# Patient Record
Sex: MALE | Race: ASIAN
[De-identification: names, ages, dates, MRNs, and addresses within clinical notes are randomized per-mention and may not be internally consistent; named-entity substitution may affect disease eponyms.]

---

## 2017-03-23 ENCOUNTER — APPOINTMENT (OUTPATIENT)
Dept: UROLOGY | Facility: CLINIC | Age: 62
End: 2017-03-23

## 2017-05-03 ENCOUNTER — EMERGENCY (EMERGENCY)
Facility: HOSPITAL | Age: 62
LOS: 0 days | Discharge: HOME | End: 2017-05-03

## 2017-06-28 DIAGNOSIS — M71.22 SYNOVIAL CYST OF POPLITEAL SPACE [BAKER], LEFT KNEE: ICD-10-CM

## 2017-06-28 DIAGNOSIS — Z79.4 LONG TERM (CURRENT) USE OF INSULIN: ICD-10-CM

## 2017-06-28 DIAGNOSIS — M25.562 PAIN IN LEFT KNEE: ICD-10-CM

## 2017-06-28 DIAGNOSIS — I10 ESSENTIAL (PRIMARY) HYPERTENSION: ICD-10-CM

## 2017-06-28 DIAGNOSIS — Z79.899 OTHER LONG TERM (CURRENT) DRUG THERAPY: ICD-10-CM

## 2017-06-28 DIAGNOSIS — Z79.84 LONG TERM (CURRENT) USE OF ORAL HYPOGLYCEMIC DRUGS: ICD-10-CM

## 2017-06-28 DIAGNOSIS — I25.10 ATHEROSCLEROTIC HEART DISEASE OF NATIVE CORONARY ARTERY WITHOUT ANGINA PECTORIS: ICD-10-CM

## 2017-06-28 DIAGNOSIS — E11.9 TYPE 2 DIABETES MELLITUS WITHOUT COMPLICATIONS: ICD-10-CM

## 2017-06-28 DIAGNOSIS — Z79.82 LONG TERM (CURRENT) USE OF ASPIRIN: ICD-10-CM

## 2017-06-28 DIAGNOSIS — K52.9 NONINFECTIVE GASTROENTERITIS AND COLITIS, UNSPECIFIED: ICD-10-CM

## 2017-06-28 DIAGNOSIS — Z95.1 PRESENCE OF AORTOCORONARY BYPASS GRAFT: ICD-10-CM

## 2017-06-28 DIAGNOSIS — E78.00 PURE HYPERCHOLESTEROLEMIA, UNSPECIFIED: ICD-10-CM

## 2017-07-24 ENCOUNTER — EMERGENCY (EMERGENCY)
Facility: HOSPITAL | Age: 62
LOS: 0 days | Discharge: HOME | End: 2017-07-24

## 2017-07-24 DIAGNOSIS — K21.9 GASTRO-ESOPHAGEAL REFLUX DISEASE WITHOUT ESOPHAGITIS: ICD-10-CM

## 2017-07-24 DIAGNOSIS — E11.9 TYPE 2 DIABETES MELLITUS WITHOUT COMPLICATIONS: ICD-10-CM

## 2017-07-24 DIAGNOSIS — R34 ANURIA AND OLIGURIA: ICD-10-CM

## 2017-07-24 DIAGNOSIS — Z95.1 PRESENCE OF AORTOCORONARY BYPASS GRAFT: ICD-10-CM

## 2017-07-24 DIAGNOSIS — Z79.82 LONG TERM (CURRENT) USE OF ASPIRIN: ICD-10-CM

## 2017-07-24 DIAGNOSIS — B34.9 VIRAL INFECTION, UNSPECIFIED: ICD-10-CM

## 2017-07-24 DIAGNOSIS — I25.10 ATHEROSCLEROTIC HEART DISEASE OF NATIVE CORONARY ARTERY WITHOUT ANGINA PECTORIS: ICD-10-CM

## 2017-07-24 DIAGNOSIS — I10 ESSENTIAL (PRIMARY) HYPERTENSION: ICD-10-CM

## 2017-07-24 DIAGNOSIS — E78.00 PURE HYPERCHOLESTEROLEMIA, UNSPECIFIED: ICD-10-CM

## 2017-07-24 DIAGNOSIS — R20.2 PARESTHESIA OF SKIN: ICD-10-CM

## 2017-07-24 DIAGNOSIS — E78.5 HYPERLIPIDEMIA, UNSPECIFIED: ICD-10-CM

## 2017-07-24 DIAGNOSIS — Z88.0 ALLERGY STATUS TO PENICILLIN: ICD-10-CM

## 2017-07-24 DIAGNOSIS — M79.1 MYALGIA: ICD-10-CM

## 2017-09-14 ENCOUNTER — APPOINTMENT (OUTPATIENT)
Dept: UROLOGY | Facility: CLINIC | Age: 62
End: 2017-09-14
Payer: MEDICAID

## 2017-09-14 VITALS
SYSTOLIC BLOOD PRESSURE: 114 MMHG | BODY MASS INDEX: 29.82 KG/M2 | HEART RATE: 89 BPM | HEIGHT: 67 IN | WEIGHT: 190 LBS | DIASTOLIC BLOOD PRESSURE: 62 MMHG

## 2017-09-14 DIAGNOSIS — R39.13 SPLITTING OF URINARY STREAM: ICD-10-CM

## 2017-09-14 DIAGNOSIS — K21.9 GASTRO-ESOPHAGEAL REFLUX DISEASE W/OUT ESOPHAGITIS: ICD-10-CM

## 2017-09-14 DIAGNOSIS — Z82.49 FAMILY HISTORY OF ISCHEMIC HEART DISEASE AND OTHER DISEASES OF THE CIRCULATORY SYSTEM: ICD-10-CM

## 2017-09-14 DIAGNOSIS — Z87.891 PERSONAL HISTORY OF NICOTINE DEPENDENCE: ICD-10-CM

## 2017-09-14 DIAGNOSIS — R35.1 NOCTURIA: ICD-10-CM

## 2017-09-14 DIAGNOSIS — R33.9 RETENTION OF URINE, UNSPECIFIED: ICD-10-CM

## 2017-09-14 DIAGNOSIS — Z83.3 FAMILY HISTORY OF DIABETES MELLITUS: ICD-10-CM

## 2017-09-14 DIAGNOSIS — N28.1 CYST OF KIDNEY, ACQUIRED: ICD-10-CM

## 2017-09-14 PROCEDURE — 99214 OFFICE O/P EST MOD 30 MIN: CPT

## 2017-09-14 NOTE — HISTORY OF PRESENT ILLNESS
[FreeTextEntry1] : Donavon Is a pleasant 62-year-old male initially from [place] who had been seen For several years as far back as 2012 2013. He has voiding dysfunction which had been controlled with alpha blockade and though he would void by day with small amounts, his uroflow the last one of which was done in October 2016 had a peak of 21 average of 11 with a volume of 538 and a post would residual of a tablespoon. He went back to [place] recently came back and tells me that since last seen if anything his voiding is 20% better. Yet he still says he is voiding slowly with small amounts with burning. If he's better than he was in October and October was within normal limits I'm not sure what the issue is here.\par \par \par \par Currently he says he has the following AUA symptom score.\par \par Incomplete emptying – five\par frequency – four\par intermittency – three\par urgency – three\par slow stream – four\par straining – 3/4\par nocturia times four \par and as a whole on a scale of 0-6 this is making him rather unhappy – five\par \par This gives him a total score of 35 which is very high.\par \par \par \par He had laboratory studies done on July 24  because of right-sided flank pain which was nondiagnostic for intra-abdominal pathology  and laboratory values at that time  showed a high glucose at 255 a very concentrated urine at greater than 1.0304+ sugar and a culture that was less than 10,000.\par \par \par \par The here today as despite Dr. Matamoros doubling up the Flomax to twice per day he still unhappy.

## 2017-09-14 NOTE — PHYSICAL EXAM
[General Appearance - Well Developed] : well developed [General Appearance - Well Nourished] : well nourished [Normal Appearance] : normal appearance [Well Groomed] : well groomed [General Appearance - In No Acute Distress] : no acute distress [Abdomen Soft] : soft [Abdomen Tenderness] : non-tender [Costovertebral Angle Tenderness] : no ~M costovertebral angle tenderness [Penis Abnormality] : normal circumcised penis [Scrotum] : the scrotum was normal [Epididymis] : the epididymides were normal [Testes Tenderness] : no tenderness of the testes [Testes Mass (___cm)] : there were no testicular masses [Anus Abnormality] : the anus and perineum were normal [Prostate Tenderness] : the prostate was not tender [No Prostate Nodules] : no prostate nodules [Prostate Size ___ gm] : prostate size [unfilled] gm [] : no respiratory distress [Respiration, Rhythm And Depth] : normal respiratory rhythm and effort [Exaggerated Use Of Accessory Muscles For Inspiration] : no accessory muscle use [Auscultation Breath Sounds / Voice Sounds] : lungs were clear to auscultation bilaterally [Oriented To Time, Place, And Person] : oriented to person, place, and time [Affect] : the affect was normal [Mood] : the mood was normal [Not Anxious] : not anxious [Normal Station and Gait] : the gait and station were normal for the patient's age [FreeTextEntry1] : BC reflex and deep tendon reflexes were normal

## 2017-09-14 NOTE — LETTER HEADER
[Matias Marte MD] : Matias Marte MD [Director of Urology] : Director of Urology [[address]] : [address] [[address]] : [address] [[place] [address]] : [place] [address] [Tel [phone]] : Tel [phone] [Fax [phone]] : Fax [phone]

## 2017-09-14 NOTE — ASSESSMENT
[FreeTextEntry1] : He is not been seen for close to a year. He is telling me that his symptoms are better but terrible. However the objective testing done in October was actually quite good. It may be that during the time in [place] it slowly got worse and now it's better than it was there but not as good as it was when we saw him in the past. Were going to need start from scratch getting at least semi-objective testing and deciding whether or not to go to invasive or noninvasive urodynamics. Please note he has been on the Proscar for quite some time  and his prostate is still quite large.

## 2017-10-12 ENCOUNTER — OUTPATIENT (OUTPATIENT)
Dept: OUTPATIENT SERVICES | Facility: HOSPITAL | Age: 62
LOS: 1 days | Discharge: HOME | End: 2017-10-12

## 2017-10-12 DIAGNOSIS — M10.062 IDIOPATHIC GOUT, LEFT KNEE: ICD-10-CM

## 2017-10-12 DIAGNOSIS — R20.2 PARESTHESIA OF SKIN: ICD-10-CM

## 2017-10-12 DIAGNOSIS — M79.1 MYALGIA: ICD-10-CM

## 2017-10-12 DIAGNOSIS — E11.9 TYPE 2 DIABETES MELLITUS WITHOUT COMPLICATIONS: ICD-10-CM

## 2017-10-20 ENCOUNTER — EMERGENCY (EMERGENCY)
Facility: HOSPITAL | Age: 62
LOS: 0 days | Discharge: HOME | End: 2017-10-21

## 2017-10-20 DIAGNOSIS — Z95.1 PRESENCE OF AORTOCORONARY BYPASS GRAFT: ICD-10-CM

## 2017-10-20 DIAGNOSIS — M25.511 PAIN IN RIGHT SHOULDER: ICD-10-CM

## 2017-10-20 DIAGNOSIS — M25.562 PAIN IN LEFT KNEE: ICD-10-CM

## 2017-10-20 DIAGNOSIS — G89.29 OTHER CHRONIC PAIN: ICD-10-CM

## 2017-10-20 DIAGNOSIS — Z88.0 ALLERGY STATUS TO PENICILLIN: ICD-10-CM

## 2017-10-20 DIAGNOSIS — M25.462 EFFUSION, LEFT KNEE: ICD-10-CM

## 2017-10-20 DIAGNOSIS — Z79.82 LONG TERM (CURRENT) USE OF ASPIRIN: ICD-10-CM

## 2017-10-20 DIAGNOSIS — E11.9 TYPE 2 DIABETES MELLITUS WITHOUT COMPLICATIONS: ICD-10-CM

## 2017-10-20 DIAGNOSIS — E78.00 PURE HYPERCHOLESTEROLEMIA, UNSPECIFIED: ICD-10-CM

## 2017-10-20 DIAGNOSIS — K21.9 GASTRO-ESOPHAGEAL REFLUX DISEASE WITHOUT ESOPHAGITIS: ICD-10-CM

## 2017-10-20 DIAGNOSIS — I10 ESSENTIAL (PRIMARY) HYPERTENSION: ICD-10-CM

## 2017-10-20 DIAGNOSIS — M79.1 MYALGIA: ICD-10-CM

## 2017-10-20 DIAGNOSIS — R20.2 PARESTHESIA OF SKIN: ICD-10-CM

## 2017-10-20 DIAGNOSIS — M54.2 CERVICALGIA: ICD-10-CM

## 2017-10-24 ENCOUNTER — INPATIENT (INPATIENT)
Facility: HOSPITAL | Age: 62
LOS: 0 days | Discharge: HOME | End: 2017-10-25
Attending: INTERNAL MEDICINE

## 2017-10-24 DIAGNOSIS — M79.1 MYALGIA: ICD-10-CM

## 2017-10-24 DIAGNOSIS — R20.2 PARESTHESIA OF SKIN: ICD-10-CM

## 2017-10-24 DIAGNOSIS — E11.9 TYPE 2 DIABETES MELLITUS WITHOUT COMPLICATIONS: ICD-10-CM

## 2017-10-30 DIAGNOSIS — R51 HEADACHE: ICD-10-CM

## 2017-10-30 DIAGNOSIS — M17.10 UNILATERAL PRIMARY OSTEOARTHRITIS, UNSPECIFIED KNEE: ICD-10-CM

## 2017-10-30 DIAGNOSIS — R53.1 WEAKNESS: ICD-10-CM

## 2017-10-30 DIAGNOSIS — I10 ESSENTIAL (PRIMARY) HYPERTENSION: ICD-10-CM

## 2017-10-30 DIAGNOSIS — Z79.4 LONG TERM (CURRENT) USE OF INSULIN: ICD-10-CM

## 2017-10-30 DIAGNOSIS — E78.5 HYPERLIPIDEMIA, UNSPECIFIED: ICD-10-CM

## 2017-10-30 DIAGNOSIS — Z88.0 ALLERGY STATUS TO PENICILLIN: ICD-10-CM

## 2017-10-30 DIAGNOSIS — N40.0 BENIGN PROSTATIC HYPERPLASIA WITHOUT LOWER URINARY TRACT SYMPTOMS: ICD-10-CM

## 2017-10-30 DIAGNOSIS — K21.9 GASTRO-ESOPHAGEAL REFLUX DISEASE WITHOUT ESOPHAGITIS: ICD-10-CM

## 2017-10-30 DIAGNOSIS — I25.10 ATHEROSCLEROTIC HEART DISEASE OF NATIVE CORONARY ARTERY WITHOUT ANGINA PECTORIS: ICD-10-CM

## 2017-10-30 DIAGNOSIS — E11.40 TYPE 2 DIABETES MELLITUS WITH DIABETIC NEUROPATHY, UNSPECIFIED: ICD-10-CM

## 2017-10-30 DIAGNOSIS — Z95.1 PRESENCE OF AORTOCORONARY BYPASS GRAFT: ICD-10-CM

## 2017-10-30 DIAGNOSIS — R20.0 ANESTHESIA OF SKIN: ICD-10-CM

## 2017-11-01 ENCOUNTER — OTHER (OUTPATIENT)
Age: 62
End: 2017-11-01

## 2017-12-01 ENCOUNTER — APPOINTMENT (OUTPATIENT)
Dept: UROLOGY | Facility: CLINIC | Age: 62
End: 2017-12-01

## 2018-03-29 ENCOUNTER — EMERGENCY (EMERGENCY)
Facility: HOSPITAL | Age: 63
LOS: 0 days | Discharge: HOME | End: 2018-03-29

## 2018-03-29 VITALS
DIASTOLIC BLOOD PRESSURE: 88 MMHG | TEMPERATURE: 97 F | HEART RATE: 85 BPM | RESPIRATION RATE: 20 BRPM | OXYGEN SATURATION: 98 % | SYSTOLIC BLOOD PRESSURE: 133 MMHG

## 2018-03-29 DIAGNOSIS — Z87.891 PERSONAL HISTORY OF NICOTINE DEPENDENCE: ICD-10-CM

## 2018-03-29 DIAGNOSIS — Z88.0 ALLERGY STATUS TO PENICILLIN: ICD-10-CM

## 2018-03-29 DIAGNOSIS — S49.91XA UNSPECIFIED INJURY OF RIGHT SHOULDER AND UPPER ARM, INITIAL ENCOUNTER: ICD-10-CM
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
## 2018-03-29 DIAGNOSIS — Z98.890 OTHER SPECIFIED POSTPROCEDURAL STATES: Chronic | ICD-10-CM
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
## 2018-03-29 DIAGNOSIS — S46.911A STRAIN OF UNSPECIFIED MUSCLE, FASCIA AND TENDON AT SHOULDER AND UPPER ARM LEVEL, RIGHT ARM, INITIAL ENCOUNTER: ICD-10-CM

## 2018-03-29 DIAGNOSIS — Z79.84 LONG TERM (CURRENT) USE OF ORAL HYPOGLYCEMIC DRUGS: ICD-10-CM

## 2018-03-29 DIAGNOSIS — Z98.890 OTHER SPECIFIED POSTPROCEDURAL STATES: ICD-10-CM

## 2018-03-29 DIAGNOSIS — I10 ESSENTIAL (PRIMARY) HYPERTENSION: ICD-10-CM
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
## 2018-03-29 DIAGNOSIS — E11.9 TYPE 2 DIABETES MELLITUS WITHOUT COMPLICATIONS: ICD-10-CM
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
## 2018-03-29 DIAGNOSIS — E78.5 HYPERLIPIDEMIA, UNSPECIFIED: ICD-10-CM

## 2018-03-29 RX ORDER — IBUPROFEN 200 MG
600 TABLET ORAL ONCE
Qty: 0 | Refills: 0 | Status: COMPLETED | OUTPATIENT
Start: 2018-03-29 | End: 2018-03-29

## 2018-03-29 RX ADMIN — Medication 600 MILLIGRAM(S): at 22:00

## 2018-03-29 RX ADMIN — Medication 600 MILLIGRAM(S): at 21:30

## 2018-03-29 NOTE — ED PROVIDER NOTE - MUSCULOSKELETAL, MLM
Right shoulder: no deformity, (+) tenderness posteriorly, limited ROM due to pain, brachial and radial pulses 2+, no motor or sensory deficit

## 2018-03-29 NOTE — ED PROVIDER NOTE - PMH
ASHD (arteriosclerotic heart disease)    Hyperlipidemia, unspecified hyperlipidemia type    Hypertension, unspecified type    Type 2 diabetes mellitus with complication, unspecified long term insulin use status

## 2018-03-29 NOTE — ED PROVIDER NOTE - MEDICAL DECISION MAKING DETAILS
RICE; Tylenol prn; orthopedic follow up Dr. José; any new or worsening symptoms, will return to ER  Note complete

## 2018-03-29 NOTE — ED PROVIDER NOTE - OBJECTIVE STATEMENT
62 y.o. male with a PMHx of DM and ASHD presented to the ER c/o Right shoulder injury today.  Pt states he reached overhead for a suitcase.  The suitcase was heavy and his arm dropped down straining his shoulder. (+) h/o Right shoulder "problem." Pt never had recommended sx by ortho. Limited ROM.  Denies extremity weakness/paresthesias.  No other complaints.

## 2018-04-13 NOTE — LETTER BODY
30 day ILR carelink remote transmission. See device report for details. [FreeTextEntry2] : Yady Juárez MD\par [address] [facility]\[place] [address] [Attached please find my note.] : Attached please find my note. [Thank you very much for allowing me to participate in the care of this patient. If you have any questions, please do not hesitate to contact me] : Thank you very much for allowing me to participate in the care of this patient. If you have any questions, please do not hesitate to contact me.

## 2019-01-10 ENCOUNTER — INPATIENT (INPATIENT)
Facility: HOSPITAL | Age: 64
LOS: 0 days | Discharge: LEFT BEFORE DISCHARGE | End: 2019-01-11
Attending: HOSPITALIST | Admitting: HOSPITALIST

## 2019-01-10 VITALS
OXYGEN SATURATION: 99 % | HEART RATE: 79 BPM | SYSTOLIC BLOOD PRESSURE: 103 MMHG | RESPIRATION RATE: 20 BRPM | TEMPERATURE: 97 F | DIASTOLIC BLOOD PRESSURE: 59 MMHG

## 2019-01-10 DIAGNOSIS — Z98.890 OTHER SPECIFIED POSTPROCEDURAL STATES: Chronic | ICD-10-CM

## 2019-01-10 LAB
ALBUMIN SERPL ELPH-MCNC: 4.4 G/DL — SIGNIFICANT CHANGE UP (ref 3.5–5.2)
ALP SERPL-CCNC: 87 U/L — SIGNIFICANT CHANGE UP (ref 30–115)
ALT FLD-CCNC: 27 U/L — SIGNIFICANT CHANGE UP (ref 0–41)
ANION GAP SERPL CALC-SCNC: 19 MMOL/L — HIGH (ref 7–14)
AST SERPL-CCNC: 20 U/L — SIGNIFICANT CHANGE UP (ref 0–41)
BASOPHILS # BLD AUTO: 0.01 K/UL — SIGNIFICANT CHANGE UP (ref 0–0.2)
BASOPHILS NFR BLD AUTO: 0.1 % — SIGNIFICANT CHANGE UP (ref 0–1)
BILIRUB SERPL-MCNC: 0.4 MG/DL — SIGNIFICANT CHANGE UP (ref 0.2–1.2)
BUN SERPL-MCNC: 23 MG/DL — HIGH (ref 10–20)
CALCIUM SERPL-MCNC: 9.8 MG/DL — SIGNIFICANT CHANGE UP (ref 8.5–10.1)
CHLORIDE SERPL-SCNC: 97 MMOL/L — LOW (ref 98–110)
CO2 SERPL-SCNC: 22 MMOL/L — SIGNIFICANT CHANGE UP (ref 17–32)
CREAT SERPL-MCNC: 1.5 MG/DL — SIGNIFICANT CHANGE UP (ref 0.7–1.5)
EOSINOPHIL # BLD AUTO: 0.05 K/UL — SIGNIFICANT CHANGE UP (ref 0–0.7)
EOSINOPHIL NFR BLD AUTO: 0.6 % — SIGNIFICANT CHANGE UP (ref 0–8)
GLUCOSE SERPL-MCNC: 162 MG/DL — HIGH (ref 70–99)
HCT VFR BLD CALC: 43.6 % — SIGNIFICANT CHANGE UP (ref 42–52)
HGB BLD-MCNC: 15 G/DL — SIGNIFICANT CHANGE UP (ref 14–18)
IMM GRANULOCYTES NFR BLD AUTO: 0.2 % — SIGNIFICANT CHANGE UP (ref 0.1–0.3)
LACTATE SERPL-SCNC: 1.4 MMOL/L — SIGNIFICANT CHANGE UP (ref 0.5–2.2)
LIDOCAIN IGE QN: 107 U/L — HIGH (ref 7–60)
LYMPHOCYTES # BLD AUTO: 1.16 K/UL — LOW (ref 1.2–3.4)
LYMPHOCYTES # BLD AUTO: 13.6 % — LOW (ref 20.5–51.1)
MCHC RBC-ENTMCNC: 29.8 PG — SIGNIFICANT CHANGE UP (ref 27–31)
MCHC RBC-ENTMCNC: 34.4 G/DL — SIGNIFICANT CHANGE UP (ref 32–37)
MCV RBC AUTO: 86.5 FL — SIGNIFICANT CHANGE UP (ref 80–94)
MONOCYTES # BLD AUTO: 0.54 K/UL — SIGNIFICANT CHANGE UP (ref 0.1–0.6)
MONOCYTES NFR BLD AUTO: 6.4 % — SIGNIFICANT CHANGE UP (ref 1.7–9.3)
NEUTROPHILS # BLD AUTO: 6.72 K/UL — HIGH (ref 1.4–6.5)
NEUTROPHILS NFR BLD AUTO: 79.1 % — HIGH (ref 42.2–75.2)
NRBC # BLD: 0 /100 WBCS — SIGNIFICANT CHANGE UP (ref 0–0)
PLATELET # BLD AUTO: 184 K/UL — SIGNIFICANT CHANGE UP (ref 130–400)
POTASSIUM SERPL-MCNC: 5 MMOL/L — SIGNIFICANT CHANGE UP (ref 3.5–5)
POTASSIUM SERPL-SCNC: 5 MMOL/L — SIGNIFICANT CHANGE UP (ref 3.5–5)
PROT SERPL-MCNC: 7.3 G/DL — SIGNIFICANT CHANGE UP (ref 6–8)
RBC # BLD: 5.04 M/UL — SIGNIFICANT CHANGE UP (ref 4.7–6.1)
RBC # FLD: 13.2 % — SIGNIFICANT CHANGE UP (ref 11.5–14.5)
SODIUM SERPL-SCNC: 138 MMOL/L — SIGNIFICANT CHANGE UP (ref 135–146)
TROPONIN T SERPL-MCNC: <0.01 NG/ML — SIGNIFICANT CHANGE UP
WBC # BLD: 8.5 K/UL — SIGNIFICANT CHANGE UP (ref 4.8–10.8)
WBC # FLD AUTO: 8.5 K/UL — SIGNIFICANT CHANGE UP (ref 4.8–10.8)

## 2019-01-10 RX ORDER — SODIUM CHLORIDE 9 MG/ML
1000 INJECTION, SOLUTION INTRAVENOUS ONCE
Qty: 0 | Refills: 0 | Status: COMPLETED | OUTPATIENT
Start: 2019-01-10 | End: 2019-01-10

## 2019-01-10 RX ADMIN — SODIUM CHLORIDE 1000 MILLILITER(S): 9 INJECTION, SOLUTION INTRAVENOUS at 22:57

## 2019-01-10 NOTE — ED ADULT NURSE NOTE - NSIMPLEMENTINTERV_GEN_ALL_ED
Implemented All Universal Safety Interventions:  [place] to call system. Call bell, personal items and telephone within reach. Instruct patient to call for assistance. Room bathroom lighting operational. Non-slip footwear when patient is off stretcher. Physically safe environment: no spills, clutter or unnecessary equipment. Stretcher in lowest position, wheels locked, appropriate side rails in place.

## 2019-01-11 ENCOUNTER — TRANSCRIPTION ENCOUNTER (OUTPATIENT)
Age: 64
End: 2019-01-11

## 2019-01-11 VITALS
TEMPERATURE: 96 F | RESPIRATION RATE: 18 BRPM | DIASTOLIC BLOOD PRESSURE: 67 MMHG | SYSTOLIC BLOOD PRESSURE: 116 MMHG | HEART RATE: 95 BPM

## 2019-01-11 PROBLEM — E11.8 TYPE 2 DIABETES MELLITUS WITH UNSPECIFIED COMPLICATIONS: Chronic | Status: ACTIVE | Noted: 2018-03-29

## 2019-01-11 PROBLEM — E78.5 HYPERLIPIDEMIA, UNSPECIFIED: Chronic | Status: ACTIVE | Noted: 2018-03-29

## 2019-01-11 PROBLEM — I25.10 ATHEROSCLEROTIC HEART DISEASE OF NATIVE CORONARY ARTERY WITHOUT ANGINA PECTORIS: Chronic | Status: ACTIVE | Noted: 2018-03-29

## 2019-01-11 PROBLEM — I10 ESSENTIAL (PRIMARY) HYPERTENSION: Chronic | Status: ACTIVE | Noted: 2018-03-29

## 2019-01-11 LAB
ANION GAP SERPL CALC-SCNC: 21 MMOL/L — HIGH (ref 7–14)
APTT BLD: 32.1 SEC — SIGNIFICANT CHANGE UP (ref 27–39.2)
BASOPHILS # BLD AUTO: 0.01 K/UL — SIGNIFICANT CHANGE UP (ref 0–0.2)
BASOPHILS NFR BLD AUTO: 0.2 % — SIGNIFICANT CHANGE UP (ref 0–1)
BLD GP AB SCN SERPL QL: SIGNIFICANT CHANGE UP
BUN SERPL-MCNC: 15 MG/DL — SIGNIFICANT CHANGE UP (ref 10–20)
CALCIUM SERPL-MCNC: 9.7 MG/DL — SIGNIFICANT CHANGE UP (ref 8.5–10.1)
CHLORIDE SERPL-SCNC: 94 MMOL/L — LOW (ref 98–110)
CHOLEST SERPL-MCNC: 168 MG/DL — SIGNIFICANT CHANGE UP (ref 100–200)
CO2 SERPL-SCNC: 25 MMOL/L — SIGNIFICANT CHANGE UP (ref 17–32)
CREAT SERPL-MCNC: 1.1 MG/DL — SIGNIFICANT CHANGE UP (ref 0.7–1.5)
EOSINOPHIL # BLD AUTO: 0.12 K/UL — SIGNIFICANT CHANGE UP (ref 0–0.7)
EOSINOPHIL NFR BLD AUTO: 2 % — SIGNIFICANT CHANGE UP (ref 0–8)
ESTIMATED AVERAGE GLUCOSE: 237 MG/DL — HIGH (ref 68–114)
GLUCOSE BLDC GLUCOMTR-MCNC: 139 MG/DL — HIGH (ref 70–99)
GLUCOSE BLDC GLUCOMTR-MCNC: 177 MG/DL — HIGH (ref 70–99)
GLUCOSE SERPL-MCNC: 158 MG/DL — HIGH (ref 70–99)
HBA1C BLD-MCNC: 9.9 % — HIGH (ref 4–5.6)
HCT VFR BLD CALC: 46.4 % — SIGNIFICANT CHANGE UP (ref 42–52)
HDLC SERPL-MCNC: 49 MG/DL — SIGNIFICANT CHANGE UP
HGB BLD-MCNC: 15.4 G/DL — SIGNIFICANT CHANGE UP (ref 14–18)
IMM GRANULOCYTES NFR BLD AUTO: 0.3 % — SIGNIFICANT CHANGE UP (ref 0.1–0.3)
INR BLD: 1.07 RATIO — SIGNIFICANT CHANGE UP (ref 0.65–1.3)
LIPID PNL WITH DIRECT LDL SERPL: 108 MG/DL — SIGNIFICANT CHANGE UP (ref 4–129)
LYMPHOCYTES # BLD AUTO: 1.13 K/UL — LOW (ref 1.2–3.4)
LYMPHOCYTES # BLD AUTO: 19 % — LOW (ref 20.5–51.1)
MAGNESIUM SERPL-MCNC: 2.1 MG/DL — SIGNIFICANT CHANGE UP (ref 1.8–2.4)
MCHC RBC-ENTMCNC: 28.6 PG — SIGNIFICANT CHANGE UP (ref 27–31)
MCHC RBC-ENTMCNC: 33.2 G/DL — SIGNIFICANT CHANGE UP (ref 32–37)
MCV RBC AUTO: 86.2 FL — SIGNIFICANT CHANGE UP (ref 80–94)
MONOCYTES # BLD AUTO: 0.61 K/UL — HIGH (ref 0.1–0.6)
MONOCYTES NFR BLD AUTO: 10.3 % — HIGH (ref 1.7–9.3)
NEUTROPHILS # BLD AUTO: 4.06 K/UL — SIGNIFICANT CHANGE UP (ref 1.4–6.5)
NEUTROPHILS NFR BLD AUTO: 68.2 % — SIGNIFICANT CHANGE UP (ref 42.2–75.2)
NRBC # BLD: 0 /100 WBCS — SIGNIFICANT CHANGE UP (ref 0–0)
PHOSPHATE SERPL-MCNC: 3.8 MG/DL — SIGNIFICANT CHANGE UP (ref 2.1–4.9)
PLATELET # BLD AUTO: 170 K/UL — SIGNIFICANT CHANGE UP (ref 130–400)
POTASSIUM SERPL-MCNC: 4.6 MMOL/L — SIGNIFICANT CHANGE UP (ref 3.5–5)
POTASSIUM SERPL-SCNC: 4.6 MMOL/L — SIGNIFICANT CHANGE UP (ref 3.5–5)
PROTHROM AB SERPL-ACNC: 12.3 SEC — SIGNIFICANT CHANGE UP (ref 9.95–12.87)
RBC # BLD: 5.38 M/UL — SIGNIFICANT CHANGE UP (ref 4.7–6.1)
RBC # FLD: 13.2 % — SIGNIFICANT CHANGE UP (ref 11.5–14.5)
SODIUM SERPL-SCNC: 140 MMOL/L — SIGNIFICANT CHANGE UP (ref 135–146)
TOTAL CHOLESTEROL/HDL RATIO MEASUREMENT: 3.4 RATIO — LOW (ref 4–5.5)
TRIGL SERPL-MCNC: 116 MG/DL — SIGNIFICANT CHANGE UP (ref 10–149)
TYPE + AB SCN PNL BLD: SIGNIFICANT CHANGE UP
WBC # BLD: 5.95 K/UL — SIGNIFICANT CHANGE UP (ref 4.8–10.8)
WBC # FLD AUTO: 5.95 K/UL — SIGNIFICANT CHANGE UP (ref 4.8–10.8)

## 2019-01-11 RX ORDER — PANTOPRAZOLE SODIUM 20 MG/1
1 TABLET, DELAYED RELEASE ORAL
Qty: 28 | Refills: 0 | OUTPATIENT
Start: 2019-01-11 | End: 2019-01-24

## 2019-01-11 RX ORDER — ACETAMINOPHEN 500 MG
650 TABLET ORAL ONCE
Qty: 0 | Refills: 0 | Status: COMPLETED | OUTPATIENT
Start: 2019-01-11 | End: 2019-01-11

## 2019-01-11 RX ORDER — CYCLOBENZAPRINE HYDROCHLORIDE 10 MG/1
10 TABLET, FILM COATED ORAL THREE TIMES A DAY
Qty: 0 | Refills: 0 | Status: DISCONTINUED | OUTPATIENT
Start: 2019-01-11 | End: 2019-01-11

## 2019-01-11 RX ORDER — ASPIRIN/CALCIUM CARB/MAGNESIUM 324 MG
1 TABLET ORAL
Qty: 0 | Refills: 0 | COMMUNITY

## 2019-01-11 RX ORDER — METFORMIN HYDROCHLORIDE 850 MG/1
0 TABLET ORAL
Qty: 0 | Refills: 0 | COMMUNITY

## 2019-01-11 RX ORDER — PANTOPRAZOLE SODIUM 20 MG/1
40 TABLET, DELAYED RELEASE ORAL
Qty: 0 | Refills: 0 | Status: DISCONTINUED | OUTPATIENT
Start: 2019-01-11 | End: 2019-01-11

## 2019-01-11 RX ORDER — PANTOPRAZOLE SODIUM 20 MG/1
1 TABLET, DELAYED RELEASE ORAL
Qty: 14 | Refills: 0
Start: 2019-01-11 | End: 2019-01-24

## 2019-01-11 RX ORDER — GEMFIBROZIL 600 MG
600 TABLET ORAL
Qty: 0 | Refills: 0 | Status: DISCONTINUED | OUTPATIENT
Start: 2019-01-11 | End: 2019-01-11

## 2019-01-11 RX ORDER — ZOLPIDEM TARTRATE 10 MG/1
5 TABLET ORAL AT BEDTIME
Qty: 0 | Refills: 0 | Status: DISCONTINUED | OUTPATIENT
Start: 2019-01-11 | End: 2019-01-11

## 2019-01-11 RX ORDER — SIMVASTATIN 20 MG/1
40 TABLET, FILM COATED ORAL AT BEDTIME
Qty: 0 | Refills: 0 | Status: DISCONTINUED | OUTPATIENT
Start: 2019-01-11 | End: 2019-01-11

## 2019-01-11 RX ORDER — LISINOPRIL 2.5 MG/1
2.5 TABLET ORAL DAILY
Qty: 0 | Refills: 0 | Status: DISCONTINUED | OUTPATIENT
Start: 2019-01-11 | End: 2019-01-11

## 2019-01-11 RX ORDER — PIOGLITAZONE HYDROCHLORIDE 15 MG/1
0 TABLET ORAL
Qty: 0 | Refills: 0 | COMMUNITY

## 2019-01-11 RX ORDER — SODIUM CHLORIDE 9 MG/ML
1000 INJECTION, SOLUTION INTRAVENOUS
Qty: 0 | Refills: 0 | Status: DISCONTINUED | OUTPATIENT
Start: 2019-01-11 | End: 2019-01-11

## 2019-01-11 RX ORDER — PANTOPRAZOLE SODIUM 20 MG/1
1 TABLET, DELAYED RELEASE ORAL
Qty: 28 | Refills: 0
Start: 2019-01-11 | End: 2019-01-24

## 2019-01-11 RX ORDER — CHLORHEXIDINE GLUCONATE 213 G/1000ML
1 SOLUTION TOPICAL
Qty: 0 | Refills: 0 | Status: DISCONTINUED | OUTPATIENT
Start: 2019-01-11 | End: 2019-01-11

## 2019-01-11 RX ORDER — PANTOPRAZOLE SODIUM 20 MG/1
40 TABLET, DELAYED RELEASE ORAL ONCE
Qty: 0 | Refills: 0 | Status: COMPLETED | OUTPATIENT
Start: 2019-01-11 | End: 2019-01-11

## 2019-01-11 RX ADMIN — Medication 650 MILLIGRAM(S): at 05:49

## 2019-01-11 RX ADMIN — PANTOPRAZOLE SODIUM 40 MILLIGRAM(S): 20 TABLET, DELAYED RELEASE ORAL at 06:13

## 2019-01-11 RX ADMIN — PANTOPRAZOLE SODIUM 40 MILLIGRAM(S): 20 TABLET, DELAYED RELEASE ORAL at 00:44

## 2019-01-11 RX ADMIN — LISINOPRIL 2.5 MILLIGRAM(S): 2.5 TABLET ORAL at 11:31

## 2019-01-11 RX ADMIN — PANTOPRAZOLE SODIUM 40 MILLIGRAM(S): 20 TABLET, DELAYED RELEASE ORAL at 17:35

## 2019-01-11 RX ADMIN — SODIUM CHLORIDE 50 MILLILITER(S): 9 INJECTION, SOLUTION INTRAVENOUS at 08:15

## 2019-01-11 RX ADMIN — Medication 600 MILLIGRAM(S): at 17:35

## 2019-01-11 NOTE — PATIENT PROFILE ADULT - INDICATE TYPE
1. Return to Clinic in 2 months to discuss Low dose Naltrexone    2. Follow up with [facility] regarding your Neck and Back pain.     3. Please call Clinic 2 days before you are out of your Oxycodone.     4. Dr. Patel will reach out to Dr.Van Webber for coordination on pain management regarding your [facility] care.      5. The plan is to wean opioids when taking Enbrel again.     Follow up: 2 months.       To speak with a nurse, schedule/reschedule/cancel a clinic appointment, or request a medication refill call: [phone]     You can also reach us by uControl: [email or web address]    For refills, please call on Monday, 1 week before your medication runs out. The doctors are not always in clinic, so this gives us time to get your prescriptions ready.  Please let us know the name of the medication you are requesting a refill of.                             
Health Care Proxy (HCP)

## 2019-01-11 NOTE — DISCHARGE NOTE ADULT - CARE PLAN
Principal Discharge DX:	Upper GI bleed  Goal:	REsolution  Assessment and plan of treatment:	You are leaving against medical advice despite the risk of another bleed, and possible associated complications like hypotension, or shock. Follow up with your PMD as soon as leaving the hospital. Principal Discharge DX:	Upper GI bleed  Goal:	REsolution  Assessment and plan of treatment:	You are leaving against medical advice despite the risk of another bleed, and possible associated complications like hypotension, or shock. Follow up with your PMD as soon as leaving the hospital. You will be discharged on protonix. Please continue to take your medication as prescribed until you follow up with your PMD.

## 2019-01-11 NOTE — DISCHARGE NOTE ADULT - PLAN OF CARE
REsolution You are leaving against medical advice despite the risk of another bleed, and possible associated complications like hypotension, or shock. Follow up with your PMD as soon as leaving the hospital. You are leaving against medical advice despite the risk of another bleed, and possible associated complications like hypotension, or shock. Follow up with your PMD as soon as leaving the hospital. You will be discharged on protonix. Please continue to take your medication as prescribed until you follow up with your PMD.

## 2019-01-11 NOTE — CHART NOTE - NSCHARTNOTEFT_GEN_A_CORE
Called by RN, Pt is insisting on leaving against medical advice and is refusing continuation of his admission. he reports having to go see his PMD before he leaves for [place]. I explained to him the risks associated with leaving , including hypotension, shock, and death, and he verbalized understanding. He still insists on going home. Pt advised to follow up with his primary physician before leaving for [facility]. Will discharge on PO protonix,.

## 2019-01-11 NOTE — H&P ADULT - HISTORY OF PRESENT ILLNESS
62 yo M with PMHx of hx of biopsy-proven H. pylori+ peptic ulcer disease (2010), L parietal hematoma (2013), essential HTN, CAD s/p CABG (2015), DM2, DLD, BPH and bilateral shoulder pain presents with epigastric pain x 3-4 days and 1 episode of vomiting with blood clots. He had been feeling a burning sensation along the anterior chest for the past 3-4 days; he had been feeling unwell and nauseous. He had thrown up yesterday at 5:30 pm with black clots. He had a bowel movement which was normal brown and solid. He denies use of alcohol, smoking, NSAIDs, but admits to some occasional consumption of citrus-containing foods "orange/apple juice". He notes that he had received a steroid injection with his surgeon the day prior for L shoulder pain.     He is planning to leave for [facility] for 15 days on Sunday night. 62 yo M with PMHx of hx of biopsy-proven H. pylori+ peptic ulcer disease (2010), L parietal hematoma (2013), essential HTN, CAD s/p CABG (2015), DM2, DLD, BPH and bilateral shoulder pain presents with epigastric pain x 3-4 days and 1 episode of vomiting with blood clots. He had been feeling a burning sensation along the anterior chest for the past 3-4 days; he had been feeling unwell and nauseous. He had thrown up yesterday at 5:30 pm with black clots. He had a bowel movement which was normal brown and solid. He denies use of alcohol, smoking, NSAIDs, but admits to some occasional consumption of citrus-containing foods "orange/apple juice". He notes that he had received a steroid injection with his surgeon the day prior for L shoulder pain. His last EGD was the previous year with Dr. Zhong which was normal as per patient    He is planning to leave for [facility] for 15 days on Sunday night.

## 2019-01-11 NOTE — DISCHARGE NOTE ADULT - PATIENT PORTAL LINK FT
You can access the [facility] Patient Portal, offered by [facility], by registering with the following website: [email or web address]

## 2019-01-11 NOTE — DISCHARGE NOTE ADULT - HOSPITAL COURSE
64 yo M with PMHx of hx of biopsy-proven H. pylori+ peptic ulcer disease (2010), L parietal hematoma (2013), essential HTN, CAD s/p CABG (2015), DM2, DLD, BPH and bilateral shoulder pain presents with epigastric pain x 3-4 days and 1 episode of vomiting with blood clots. He had been feeling a burning sensation along the anterior chest for the past 3-4 days; he had been feeling unwell and nauseous. He had thrown up yesterday at 5:30 pm with black clots. He had a bowel movement which was normal brown and solid. He denies use of alcohol, smoking, NSAIDs, but admits to some occasional consumption of citrus-containing foods "orange/apple juice". He notes that he had received a steroid injection with his surgeon the day prior for L shoulder pain. His last EGD was the previous year with Dr. Zhong which was normal as per patient. Pt was admitted and was due to be evaluated by GI.    Pt is insisting on leaving against medical advice and is refusing. I explained the risks associated with leaving to him, including hypotension, shock, and death, and he verbalized understanding. He still insists on going home. Pt advised to follow up with his primary physician before leaving for [facility]. 64 yo M with PMHx of hx of biopsy-proven H. pylori+ peptic ulcer disease (2010), L parietal hematoma (2013), essential HTN, CAD s/p CABG (2015), DM2, DLD, BPH and bilateral shoulder pain presents with epigastric pain x 3-4 days and 1 episode of vomiting with blood clots. He had been feeling a burning sensation along the anterior chest for the past 3-4 days; he had been feeling unwell and nauseous. He had thrown up yesterday at 5:30 pm with black clots. He had a bowel movement which was normal brown and solid. He denies use of alcohol, smoking, NSAIDs, but admits to some occasional consumption of citrus-containing foods "orange/apple juice". He notes that he had received a steroid injection with his surgeon the day prior for L shoulder pain. His last EGD was the previous year with Dr. Zhong which was normal as per patient. Pt was admitted and was due to be evaluated by GI.    Pt is insisting on leaving against medical advice and is refusing continuation of his admission. he reports having to go see his PMD before he leaves for [place]. I explained to him the risks associated with leaving , including hypotension, shock, and death, and he verbalized understanding. He still insists on going home. Pt advised to follow up with his primary physician before leaving for [place]. Will discharge on PO protonix,. 64 yo M with PMHx of hx of biopsy-proven H. pylori+ peptic ulcer disease (2010), L parietal hematoma (2013), essential HTN, CAD s/p CABG (2015), DM2, DLD, BPH and bilateral shoulder pain presents with epigastric pain x 3-4 days and 1 episode of vomiting with blood clots. He had been feeling a burning sensation along the anterior chest for the past 3-4 days; he had been feeling unwell and nauseous. He had thrown up yesterday at 5:30 pm with black clots. He had a bowel movement which was normal brown and solid. He denies use of alcohol, smoking, NSAIDs, but admits to some occasional consumption of citrus-containing foods "orange/apple juice". He notes that he had received a steroid injection with his surgeon the day prior for L shoulder pain. His last EGD was the previous year with Dr. Zhong which was normal as per patient. Pt was admitted and was due to be evaluated by GI.    Pt is insisting on leaving against medical advice and is refusing continuation of his admission. he reports having to go see his PMD before he leaves for [place]. I explained to him the risks associated with leaving , including hypotension, shock, and death, and he verbalized understanding. He still insists on going home. Pt advised to follow up with his primary physician before leaving for [facility]. Will discharge on PO protonix BID,.

## 2019-01-11 NOTE — H&P ADULT - ATTENDING COMMENTS
Patient seen and examined independently of housestaff.  No further abdominal pain reported by the patient.  No hemaotchezia or melena reported since admission.    62yo M with Past Medical History H. pylori+ peptic ulcer disease (2010), left parietal hematoma (2013), HTN, CAD status post CABG (2015), DMII, DLD, BPH and bilateral shoulder pain admitted with epigastric pain and suspected hematemesis rule out upper GI bleed    Upper GI Bleed: admit to the medical service.  Consult gastroenterology-Dr. Zhong to evaluate for inpatient endoscopy.  Keep NPO with IV fluids.  Start Protonix 40mg IV q12.  Trend serial CBC q8h.    CAD status post CABG: ASA was held due to acute GI bleed    DM2: hold oral medications.  Start Lantus/Lispro and monitor FS while NPO.    Hypertension: continue Lisinopril, hold BP medications if the patient develops hemodynamic instability.    Hypercholesteremia: continue Zocor and Gemfibrozil at current dosages.    Insomnia: continue Ambien at bedtime    DVT prophylaxis: apply compressions stockings to the bilateral LE

## 2019-01-11 NOTE — ED PROVIDER NOTE - OBJECTIVE STATEMENT
63 yr M with hx of HTN, DM, HLD and peptic ulcer dx with complaints of epigastric abd pain, nausea and vomiting blood. Pt had coffee ground vomitus x 1, no diarrhea, no CP, no SOB. Denies fever, coughing. 63 yr M with hx of CAD s/p CABG on ASA 81mg, HTN, DM, HLD and peptic ulcer dx with complaints of epigastric abd pain, nausea and vomiting blood. Pt had coffee ground vomitus x 1, no diarrhea, no CP, no SOB. Denies fever, coughing.

## 2019-01-11 NOTE — H&P ADULT - REASON FOR ADMISSION
Epigastric pain x 3-4 days, 1 episode of vomiting with blood clots I will START or STAY ON the medications listed below when I get home from the hospital:    DME  -- 1 each subcutaneous 2 times a day   Please provide glucometer  -- Indication: For Diabetes    DME  -- Apply on skin to affected area 2 times a day   Alcohol wipes  Apply to surface prior to injection of Novolin  -- Indication: For Diabetes    DME  -- 1 application subcutaneous 2 times a day   Please provide Glucometer test strips  -- Indication: For Diabetes    dme   -- 1 application subcutaneous 2 times a day   Please provide pen needles  Before breakfast and dinner  -- Indication: For Diabetes    lisinopril 2.5 mg oral tablet  -- 1 tab(s) by mouth once a day  -- Indication: For hypertension with proteinuria     NovoLIN 70/30 subcutaneous suspension  -- 20 unit(s) subcutaneous once a day (in the morning)  before breakfast  -- Do not drink alcoholic beverages when taking this medication.  It is very important that you take or use this exactly as directed.  Do not skip doses or discontinue unless directed by your doctor.  Keep in refrigerator.  Do not freeze.    -- Indication: For Diabetes    NovoLIN 70/30 subcutaneous suspension  -- 25 unit(s) subcutaneous once a day (in the evening before dinner)  -- Do not drink alcoholic beverages when taking this medication.  It is very important that you take or use this exactly as directed.  Do not skip doses or discontinue unless directed by your doctor.  Keep in refrigerator.  Do not freeze.    -- Indication: For Diabetes    Protonix 20 mg oral delayed release tablet  -- 1 tab(s) by mouth once a day  -- Indication: For Prophylaxis

## 2019-01-11 NOTE — H&P ADULT - ASSESSMENT
62 yo M with PMHx of hx of biopsy-proven H. pylori+ peptic ulcer disease (2010), L parietal hematoma (2013), essential HTN, CAD s/p CABG (2015), DM2, DLD, BPH and bilateral shoulder pain presents with epigastric pain x 3-4 days and 1 episode of vomiting with blood clots.    #) Emesis of blood? clots, possibly 2/2 peptic ulcer disease, in context of hx of biopsy-proven H-pylori PUD (2010)  - Maintain two 18 gauges and active T+S   - Strict NPO   - Transfuse if Hb < 7, CBC Q12H  - IV Protonix 40 mg BID  - Follow-up GI for possible EGD  - Follow-up Cardiology for clearance for EGD     #) CAD s/p CABG (2015)  - Holding Aspirin 81 mg QD    #) DM2  - FS AC+HS  - Maintain -180  - Hold Metformin, Ozempic    #) Essential HTN  - Continue with Lisinopril 2.5 mg QD    #) DLD  - Continue with Simvastatin 40 mg QHS, Gemfibrozil 600 mg BID    #) Muscle Pain  - Continue with Cyclobenzaprine 10 mg PRN    #) Insomnia  - Continue with Zolpidem 10 mg QHS PRN     #) DVT ppx: SCD  #) GI ppx: IV Protonix 40 mg BID    #) Activity: Ambulate as tolerated  #) Diet: Strict NPO for possible EGD    #) Dispo: Home  #) Full Code

## 2019-01-11 NOTE — ED PROVIDER NOTE - PHYSICAL EXAMINATION
CONSTITUTIONAL: Well-developed; well-nourished; in no acute distress, nontoxic appearing  SKIN: skin exam is warm and dry,  HEAD: Normocephalic; atraumatic.  EYES: PERRL, 3 mm bilateral, no nystagmus, EOM intact; conjunctiva and sclera clear.  ENT: MMM, no nasal congestion  NECK: Supple; non tender.+ full passive ROM in all directions. No JVD  CARD: S1, S2 normal, no murmur  RESP: No wheezes, rales or rhonchi. Good air movement bilaterally  ABD: soft; non-distended; + epigastric ttp, no guarding or rebound. No Rebound, No guarding  EXT: Normal ROM. No cyanosis or edema. Dp Pulses intact.   NEURO: awake, alert, following commands, oriented, grossly unremarkable. No Focal deficits. GCS 15.   PSYCH: Cooperative, appropriate.

## 2019-01-11 NOTE — CONSULT NOTE ADULT - ASSESSMENT
64 yo M with PMHx of hx of biopsy-proven H. pylori+ peptic ulcer disease (2010), L parietal hematoma (2013), essential HTN, CAD s/p CABG (2015), DM2, DLD, BPH and bilateral shoulder pain presents with epigastric pain x 3-4 days and 1 episode of vomiting with blood clots.    #) Emesis (blood clots), possibly 2/2 peptic ulcer disease, in context of hx of biopsy-proven H-pylori PUD (2010)  - Strict NPO for procedure   - Transfuse if Hb < 7, CBC Q12H  - IV Protonix 40 mg BID  - Cardiology clearance pending      #) CAD s/p CABG (2015)  - Holding Aspirin 81 mg QD    #) DM2  - continue per medicine team     #) Essential HTN  - continue medication per medicine team     #) DLD  - continue per medicine team 64 yo M with PMHx of hx of biopsy-proven H. pylori+ peptic ulcer disease (2010), L parietal hematoma (2013), essential HTN, CAD s/p CABG (2015), DM2, DLD, BPH and bilateral shoulder pain presents with epigastric pain x 3-4 days and 1 episode of vomiting with blood clots.    #) Emesis (blood clots), possibly 2/2 peptic ulcer disease, in context of hx of biopsy-proven H-pylori PUD (2010)  - Strict NPO for procedure   - Transfuse if Hb < 7, CBC Q12H  - IV Protonix 40 mg BID    #) CAD s/p CABG (2015)  - Holding Aspirin 81 mg QD    #) DM2  - continue per medicine team     #) Essential HTN  - continue medication per medicine team     #) DLD  - continue per medicine team 62 yo M with PMHx of hx of biopsy-proven H. pylori+ peptic ulcer disease (2010), L parietal hematoma (2013), essential HTN, CAD s/p CABG (2015), DM2, DLD, BPH and bilateral shoulder pain presents with epigastric pain x 3-4 days and 1 episode of vomiting with blood clots.  -No evidence of ACS, decompensated heart failure or significant arrhythmia at this point   -mild AMBER on CKD    #) Emesis (blood clots), possibly 2/2 peptic ulcer disease, in context of hx of biopsy-proven H-pylori PUD (2010)  - Strict NPO for procedure   - Transfuse if Hb < 7, CBC Q12H  - IV Protonix 40 mg BID    #) CAD s/p CABG (2015)   stable patient is at low-moderate cardiac risk for low risk procedure  no need for further inpatient cardiac workup prior to EGD  resume asa as soon as bleeding risk permit as per GI  DM and BP control  continue lisinopril  keep patient euvolemic  monitor kidney function and maintain electrolytes within normal ranges.

## 2019-01-11 NOTE — PATIENT PROFILE ADULT - LANGUAGE ASSISTANCE NEEDED
PT SPEAKS AND UNDERSTANDS ENGLISH/No-Patient/Caregiver offered and refused free interpretation services.

## 2019-01-11 NOTE — H&P ADULT - NSHPLABSRESULTS_GEN_ALL_CORE
LABS:                        15.0   8.50  )-----------( 184      ( 10 Brian 2019 22:05 )             43.6     01-10    138  |  97<L>  |  23<H>  ----------------------------<  162<H>  5.0   |  22  |  1.5    Ca    9.8      10 Brian 2019 22:05    TPro  7.3  /  Alb  4.4  /  TBili  0.4  /  DBili  x   /  AST  20  /  ALT  27  /  AlkPhos  87  01-10          Troponin T, Serum: <0.01 ng/mL (01-10-19 @ 22:05)  Lactate, Blood: 1.4 mmol/L (01-10-19 @ 22:05)      CARDIAC MARKERS ( 10 Brian 2019 22:05 )  x     / <0.01 ng/mL / x     / x     / x          < from: CT Abdomen and Pelvis w/ IV Cont (01.11.19 @ 03:08) >    No evidence of acute abdominopelvic pathology.    < end of copied text >    < from: 12 Lead ECG (01.10.19 @ 22:49) >    Normal sinus rhythm  Voltage criteria for left ventricular hypertrophy  Inferior infarct , age undetermined  Abnormal ECG    < end of copied text >

## 2019-01-11 NOTE — DISCHARGE NOTE ADULT - MEDICATION SUMMARY - MEDICATIONS TO TAKE
I will START or STAY ON the medications listed below when I get home from the hospital:    lisinopril 2.5 mg oral tablet  -- 1 tab(s) by mouth once a day  -- Indication: For HTN    Januvia 100 mg oral tablet  -- 1 tab(s) by mouth once a day  -- Indication: For DM    metFORMIN 1000 mg oral tablet  -- 1 tab(s) by mouth 2 times a day  -- Indication: For DM    Ozempic (0.25 mg or 0.5 mg dose)  -- 1 dose(s) subcutaneous once a week  -- Indication: For DM    simvastatin 40 mg oral tablet  -- 1 tab(s) by mouth once a day (at bedtime)  -- Indication: For HLD    gemfibrozil 600 mg oral tablet  -- 1 tab(s) by mouth 2 times a day  -- Indication: For HLD    zolpidem 10 mg oral tablet  -- 1 tab(s) by mouth once a day (at bedtime), As Needed  -- Indication: For Anxiety    cyclobenzaprine  -- 10 milligram(s) by mouth once a day, As Needed  -- Indication: For Muslce relaxant    Protonix 40 mg oral delayed release tablet  -- 1 tab(s) by mouth once a day   -- It is very important that you take or use this exactly as directed.  Do not skip doses or discontinue unless directed by your doctor.  Obtain medical advice before taking any non-prescription drugs as some may affect the action of this medication.  Swallow whole.  Do not crush.    -- Indication: For GI bleed I will START or STAY ON the medications listed below when I get home from the hospital:    lisinopril 2.5 mg oral tablet  -- 1 tab(s) by mouth once a day  -- Indication: For HTN    Januvia 100 mg oral tablet  -- 1 tab(s) by mouth once a day  -- Indication: For DM    metFORMIN 1000 mg oral tablet  -- 1 tab(s) by mouth 2 times a day  -- Indication: For DM    Ozempic (0.25 mg or 0.5 mg dose)  -- 1 dose(s) subcutaneous once a week  -- Indication: For DM    simvastatin 40 mg oral tablet  -- 1 tab(s) by mouth once a day (at bedtime)  -- Indication: For HLD    gemfibrozil 600 mg oral tablet  -- 1 tab(s) by mouth 2 times a day  -- Indication: For HLD    zolpidem 10 mg oral tablet  -- 1 tab(s) by mouth once a day (at bedtime), As Needed  -- Indication: For Anxiety    cyclobenzaprine  -- 10 milligram(s) by mouth once a day, As Needed  -- Indication: For Muslce relaxant    Protonix 40 mg oral delayed release tablet  -- 1 tab(s) by mouth once a day   -- It is very important that you take or use this exactly as directed.  Do not skip doses or discontinue unless directed by your doctor.  Obtain medical advice before taking any non-prescription drugs as some may affect the action of this medication.  Swallow whole.  Do not crush.    -- Indication: For GI bleed    Protonix 40 mg oral delayed release tablet  -- 1 tab(s) by mouth 2 times a day   -- It is very important that you take or use this exactly as directed.  Do not skip doses or discontinue unless directed by your doctor.  Obtain medical advice before taking any non-prescription drugs as some may affect the action of this medication.  Swallow whole.  Do not crush.    -- Indication: For GIB

## 2019-01-11 NOTE — CONSULT NOTE ADULT - SUBJECTIVE AND OBJECTIVE BOX
HPI:  62 yo M with PMHx of hx of biopsy-proven H. pylori+ peptic ulcer disease (2010), L parietal hematoma (2013), essential HTN, CAD s/p CABG (2015), DM2, DLD, BPH and bilateral shoulder pain presents with epigastric pain x 3-4 days and 1 episode of vomiting with blood clots. He had been feeling a burning sensation along the anterior chest for the past 3-4 days; he had been feeling unwell and nauseous. He had thrown up yesterday at 5:30 pm with black clots. He had a bowel movement which was normal brown and solid. He denies use of alcohol, smoking, NSAIDs, but admits to some occasional consumption of citrus-containing foods "orange/apple juice". He notes that he had received a steroid injection with his surgeon the day prior for L shoulder pain. His last EGD was the previous year with Dr. Zhong which was normal as per patient.    He is planning to leave for [facility] for 15 days on Sunday night. (11 Jan 2019 06:32)      PAST MEDICAL & SURGICAL HISTORY  Type 2 diabetes mellitus with complication, unspecified long term insulin use status  Hyperlipidemia, unspecified hyperlipidemia type  Hypertension, unspecified type  ASHD (arteriosclerotic heart disease)  History of open heart surgery      FAMILY HISTORY:  FAMILY HISTORY:  No pertinent family history in first degree relatives      SOCIAL HISTORY:  []smoker  []Alcohol  []Drug    ALLERGIES:  amoxicillin (Other)  Bactroban (Other)  MEDICATIONS:  MEDICATIONS  (STANDING):  chlorhexidine 4% Liquid 1 Application(s) Topical <User Schedule>  gemfibrozil 600 milliGRAM(s) Oral two times a day  lactated ringers. 1000 milliLiter(s) (50 mL/Hr) IV Continuous <Continuous>  lisinopril 2.5 milliGRAM(s) Oral daily  pantoprazole  Injectable 40 milliGRAM(s) IV Push two times a day  simvastatin 40 milliGRAM(s) Oral at bedtime    MEDICATIONS  (PRN):  cyclobenzaprine 10 milliGRAM(s) Oral three times a day PRN Muscle Spasm  zolpidem 5 milliGRAM(s) Oral at bedtime PRN Insomnia  zolpidem 5 milliGRAM(s) Oral at bedtime PRN Insomnia    HOME MEDICATIONS:  Home Medications:  cyclobenzaprine: 10 milligram(s) orally once a day, As Needed (11 Jan 2019 06:45)  gemfibrozil 600 mg oral tablet: 1 tab(s) orally 2 times a day (11 Jan 2019 06:44)  Januvia 100 mg oral tablet: 1 tab(s) orally once a day (29 Mar 2018 21:52)  lisinopril 2.5 mg oral tablet: 1 tab(s) orally once a day (11 Jan 2019 06:44)  metFORMIN 1000 mg oral tablet: 1 tab(s) orally 2 times a day (11 Jan 2019 06:43)  Ozempic (0.25 mg or 0.5 mg dose): 1 dose(s) subcutaneous once a week (11 Jan 2019 06:46)  simvastatin 40 mg oral tablet: 1 tab(s) orally once a day (at bedtime) (29 Mar 2018 21:52)  zolpidem 10 mg oral tablet: 1 tab(s) orally once a day (at bedtime), As Needed (11 Jan 2019 06:45)      VITALS:   T(F): 96.5 (01-11 @ 09:00), Max: 97.4 (01-11 @ 08:15)  HR: 75 (01-11 @ 09:00) (75 - 81)  BP: 133/75 (01-11 @ 09:00) (103/59 - 133/75)  BP(mean): --  RR: 18 (01-11 @ 09:00) (18 - 20)  SpO2: 97% (01-11 @ 08:15) (97% - 99%)    I&O's Summary      REVIEW OF SYSTEMS:  CONSTITUTIONAL: No weakness, fevers or chills  EYES/ENT: No visual changes;  No vertigo or throat pain   NECK: No pain or stiffness  RESPIRATORY: No cough, wheezing, hemoptysis; No shortness of breath  CARDIOVASCULAR: No chest pain or palpitations  GASTROINTESTINAL: No abdominal or epigastric pain. No nausea, vomiting, or hematemesis; No diarrhea or constipation. No melena or hematochezia.  GENITOURINARY: No dysuria, frequency or hematuria  NEUROLOGICAL: No numbness or weakness  SKIN: No itching, no rashes    PHYSICAL EXAM:  NEURO: patient is awake , alert and oriented  GEN: Not in acute distress  NECK: no thyroid enlargement, no JVD  LUNGS: Clear to auscultation bilaterally   CARDIOVASCULAR: S1/S2 present, RRR , no murmus or rubs, + PP bilaterally  ABD: Soft, non-tender, non-distended, +BS  EXT: No NORTH  SKIN: Intact    LABS:                        15.0   8.50  )-----------( 184      ( 10 Brian 2019 22:05 )             43.6     01-10    138  |  97<L>  |  23<H>  ----------------------------<  162<H>  5.0   |  22  |  1.5    Ca    9.8      10 Brian 2019 22:05    TPro  7.3  /  Alb  4.4  /  TBili  0.4  /  DBili  x   /  AST  20  /  ALT  27  /  AlkPhos  87  01-10      Troponin T, Serum: <0.01 ng/mL (01-10-19 @ 22:05)  Lactate, Blood: 1.4 mmol/L (01-10-19 @ 22:05)    CARDIAC MARKERS ( 10 Brian 2019 22:05 )  x     / <0.01 ng/mL / x     / x     / x            Troponin trend:  Troponin T, Serum: <0.01 ng/mL (01.10.19 @ 22:05)    RADIOLOGY:  -CXR:< from: Xray Chest 1 View-PORTABLE IMMEDIATE (01.11.19 @ 01:57) >  Impression:      No radiographic evidence of acute cardiopulmonary disease.    < end of copied text >    -CCTA:< from: CT Abdomen and Pelvis w/ IV Cont (01.11.19 @ 03:08) >    IMPRESSION:  No evidence of acute abdominopelvic pathology.    < end of copied text >        ECG: < from: 12 Lead ECG (01.10.19 @ 22:49) >  Diagnosis Line Normal sinus rhythm  Voltage criteria for left ventricular hypertrophy  Inferior infarct , age undetermined  Abnormal ECG    < end of copied text HPI:  62 yo M with PMHx of hx of biopsy-proven H. pylori+ peptic ulcer disease (2010), L parietal hematoma (2013), essential HTN, CAD s/p CABG (2015), DM2, DLD, BPH and bilateral shoulder pain presents with epigastric pain x 3-4 days and 1 episode of vomiting with blood clots. He had been feeling a burning sensation along the anterior chest for the past 3-4 days; he had been feeling unwell and nauseous. He had thrown up yesterday at 5:30 pm with black clots. He had a bowel movement which was normal brown and solid. He denies use of alcohol, smoking, NSAIDs, but admits to some occasional consumption of citrus-containing foods "orange/apple juice". He notes that he had received a steroid injection with his surgeon the day prior for L shoulder pain. His last EGD was the previous year with Dr. Zhong which was normal as per patient.    Pt. denies any heart palpitation, chest pain and SOB.     PAST MEDICAL & SURGICAL HISTORY  Type 2 diabetes mellitus with complication, unspecified long term insulin use status  Hyperlipidemia, unspecified hyperlipidemia type  Hypertension, unspecified type  ASHD (arteriosclerotic heart disease)  History of open heart surgery  FAMILY HISTORY:  FAMILY HISTORY:  No pertinent family history in first degree relatives      SOCIAL HISTORY:  []smoker  []Alcohol  []Drug    ALLERGIES:  amoxicillin (Other)  Bactroban (Other)  MEDICATIONS:  MEDICATIONS  (STANDING):  chlorhexidine 4% Liquid 1 Application(s) Topical <User Schedule>  gemfibrozil 600 milliGRAM(s) Oral two times a day  lactated ringers. 1000 milliLiter(s) (50 mL/Hr) IV Continuous <Continuous>  lisinopril 2.5 milliGRAM(s) Oral daily  pantoprazole  Injectable 40 milliGRAM(s) IV Push two times a day  simvastatin 40 milliGRAM(s) Oral at bedtime    MEDICATIONS  (PRN):  cyclobenzaprine 10 milliGRAM(s) Oral three times a day PRN Muscle Spasm  zolpidem 5 milliGRAM(s) Oral at bedtime PRN Insomnia  zolpidem 5 milliGRAM(s) Oral at bedtime PRN Insomnia    HOME MEDICATIONS:  Home Medications:  cyclobenzaprine: 10 milligram(s) orally once a day, As Needed (11 Jan 2019 06:45)  gemfibrozil 600 mg oral tablet: 1 tab(s) orally 2 times a day (11 Jan 2019 06:44)  Januvia 100 mg oral tablet: 1 tab(s) orally once a day (29 Mar 2018 21:52)  lisinopril 2.5 mg oral tablet: 1 tab(s) orally once a day (11 Jan 2019 06:44)  metFORMIN 1000 mg oral tablet: 1 tab(s) orally 2 times a day (11 Jan 2019 06:43)  Ozempic (0.25 mg or 0.5 mg dose): 1 dose(s) subcutaneous once a week (11 Jan 2019 06:46)  simvastatin 40 mg oral tablet: 1 tab(s) orally once a day (at bedtime) (29 Mar 2018 21:52)  zolpidem 10 mg oral tablet: 1 tab(s) orally once a day (at bedtime), As Needed (11 Jan 2019 06:45)      VITALS:   T(F): 96.5 (01-11 @ 09:00), Max: 97.4 (01-11 @ 08:15)  HR: 75 (01-11 @ 09:00) (75 - 81)  BP: 133/75 (01-11 @ 09:00) (103/59 - 133/75)  RR: 18 (01-11 @ 09:00) (18 - 20)  SpO2: 97% (01-11 @ 08:15) (97% - 99%)    REVIEW OF SYSTEMS:  CONSTITUTIONAL: No weakness, fevers or chills  EYES/ENT: No visual changes;  No vertigo or throat pain   NECK: No pain or stiffness  RESPIRATORY: No cough, wheezing, hemoptysis; No shortness of breath  CARDIOVASCULAR: No chest pain or palpitations  GASTROINTESTINAL: No abdominal or epigastric pain. No nausea, vomiting, or hematemesis; No diarrhea or constipation. No melena or hematochezia.  GENITOURINARY: No dysuria, frequency or hematuria  NEUROLOGICAL: No numbness or weakness  SKIN: No itching, no rashes    PHYSICAL EXAM:  NEURO: patient is awake , alert and oriented  GEN: Not in acute distress  NECK: no thyroid enlargement, no JVD  LUNGS: Clear to auscultation bilaterally   CARDIOVASCULAR: S1/S2 present, RRR , no murmus or rubs, + PP bilaterally  ABD: Soft, non-tender, non-distended, +BS  EXT: No NORTH  SKIN: Intact    LABS:                        15.0   8.50  )-----------( 184      ( 10 Brian 2019 22:05 )             43.6     01-10    138  |  97<L>  |  23<H>  ----------------------------<  162<H>  5.0   |  22  |  1.5    Ca    9.8      10 Brian 2019 22:05    TPro  7.3  /  Alb  4.4  /  TBili  0.4  /  DBili  x   /  AST  20  /  ALT  27  /  AlkPhos  87  01-10      Troponin T, Serum: <0.01 ng/mL (01-10-19 @ 22:05)  Lactate, Blood: 1.4 mmol/L (01-10-19 @ 22:05)    CARDIAC MARKERS ( 10 Brian 2019 22:05 )  x     / <0.01 ng/mL / x     / x     / x        Troponin trend:  Troponin T, Serum: <0.01 ng/mL (01.10.19 @ 22:05)    RADIOLOGY:  -CXR:< from: Xray Chest 1 View-PORTABLE IMMEDIATE (01.11.19 @ 01:57) >  Impression:      No radiographic evidence of acute cardiopulmonary disease.    < end of copied text >    -CCTA:< from: CT Abdomen and Pelvis w/ IV Cont (01.11.19 @ 03:08) >    IMPRESSION:  No evidence of acute abdominopelvic pathology.    < end of copied text >    ECG: < from: 12 Lead ECG (01.10.19 @ 22:49) >  Diagnosis Line Normal sinus rhythm  Voltage criteria for left ventricular hypertrophy  Inferior infarct , age undetermined  Abnormal ECG    < end of copied text HPI:  64 yo M with PMHx of hx of biopsy-proven H. pylori+ peptic ulcer disease (2010), L parietal hematoma (2013), essential HTN, CAD s/p CABG (2015), DM2, DLD, BPH and bilateral shoulder pain presents with epigastric pain x 3-4 days and 1 episode of vomiting with blood clots. He had been feeling a burning sensation along the anterior chest for the past 3-4 days; he had been feeling unwell and nauseous. He had thrown up yesterday at 5:30 pm with black clots. He had a bowel movement which was normal brown and solid. He denies use of alcohol, smoking, NSAIDs, but admits to some occasional consumption of citrus-containing foods "orange/apple juice". He notes that he had received a steroid injection with his surgeon the day prior for L shoulder pain. His last EGD was the previous year with Dr. Zhong which was normal as per patient.    Pt. denies any heart palpitation, chest pain and SOB.     PAST MEDICAL & SURGICAL HISTORY  Type 2 diabetes mellitus with complication, unspecified long term insulin use status  Hyperlipidemia, unspecified hyperlipidemia type  Hypertension, unspecified type  ASHD (arteriosclerotic heart disease)  History of open heart surgery  FAMILY HISTORY:  FAMILY HISTORY:  No pertinent family history in first degree relatives      SOCIAL HISTORY:  []smoker  []Alcohol  []Drug    ALLERGIES:  amoxicillin (Other)  Bactroban (Other)  MEDICATIONS:  MEDICATIONS  (STANDING):  chlorhexidine 4% Liquid 1 Application(s) Topical <User Schedule>  gemfibrozil 600 milliGRAM(s) Oral two times a day  lactated ringers. 1000 milliLiter(s) (50 mL/Hr) IV Continuous <Continuous>  lisinopril 2.5 milliGRAM(s) Oral daily  pantoprazole  Injectable 40 milliGRAM(s) IV Push two times a day  simvastatin 40 milliGRAM(s) Oral at bedtime    MEDICATIONS  (PRN):  cyclobenzaprine 10 milliGRAM(s) Oral three times a day PRN Muscle Spasm  zolpidem 5 milliGRAM(s) Oral at bedtime PRN Insomnia  zolpidem 5 milliGRAM(s) Oral at bedtime PRN Insomnia    HOME MEDICATIONS:  Home Medications:  cyclobenzaprine: 10 milligram(s) orally once a day, As Needed (11 Jan 2019 06:45)  gemfibrozil 600 mg oral tablet: 1 tab(s) orally 2 times a day (11 Jan 2019 06:44)  Januvia 100 mg oral tablet: 1 tab(s) orally once a day (29 Mar 2018 21:52)  lisinopril 2.5 mg oral tablet: 1 tab(s) orally once a day (11 Jan 2019 06:44)  metFORMIN 1000 mg oral tablet: 1 tab(s) orally 2 times a day (11 Jan 2019 06:43)  Ozempic (0.25 mg or 0.5 mg dose): 1 dose(s) subcutaneous once a week (11 Jan 2019 06:46)  simvastatin 40 mg oral tablet: 1 tab(s) orally once a day (at bedtime) (29 Mar 2018 21:52)  zolpidem 10 mg oral tablet: 1 tab(s) orally once a day (at bedtime), As Needed (11 Jan 2019 06:45)      VITALS:   T(F): 96.5 (01-11 @ 09:00), Max: 97.4 (01-11 @ 08:15)  HR: 75 (01-11 @ 09:00) (75 - 81)  BP: 133/75 (01-11 @ 09:00) (103/59 - 133/75)  RR: 18 (01-11 @ 09:00) (18 - 20)  SpO2: 97% (01-11 @ 08:15) (97% - 99%)    REVIEW OF SYSTEMS:  CONSTITUTIONAL: No weakness, fevers or chills  EYES/ENT: No visual changes;  No vertigo or throat pain   NECK: No pain or stiffness  RESPIRATORY: No cough, wheezing, hemoptysis; No shortness of breath  CARDIOVASCULAR: No chest pain or palpitations  GASTROINTESTINAL: No abdominal or epigastric pain. No nausea, vomiting, or hematemesis; No diarrhea or constipation. No melena or hematochezia.  GENITOURINARY: No dysuria, frequency or hematuria  NEUROLOGICAL: No numbness or weakness  SKIN: No itching, no rashes    PHYSICAL EXAM:  NEURO: patient is awake , alert and oriented  GEN: Not in acute distress  NECK: no thyroid enlargement, no JVD  LUNGS: Clear to auscultation bilaterally   CARDIOVASCULAR: S1/S2 present, RRR , no murmus or rubs, + PP bilaterally  ABD: Soft, non-tender, non-distended, +BS  EXT: No NORTH  SKIN: Intact    LABS:                        15.0   8.50  )-----------( 184      ( 10 Brian 2019 22:05 )             43.6     01-10    138  |  97<L>  |  23<H>  ----------------------------<  162<H>  5.0   |  22  |  1.5    Ca    9.8      10 Brian 2019 22:05    TPro  7.3  /  Alb  4.4  /  TBili  0.4  /  DBili  x   /  AST  20  /  ALT  27  /  AlkPhos  87  01-10      Troponin T, Serum: <0.01 ng/mL (01-10-19 @ 22:05)  Lactate, Blood: 1.4 mmol/L (01-10-19 @ 22:05)    CARDIAC MARKERS ( 10 Brian 2019 22:05 )  x     / <0.01 ng/mL / x     / x     / x        Troponin trend:  Troponin T, Serum: <0.01 ng/mL (01.10.19 @ 22:05)    RADIOLOGY:  -CXR:< from: Xray Chest 1 View-PORTABLE IMMEDIATE (01.11.19 @ 01:57) >  Impression:      No radiographic evidence of acute cardiopulmonary disease.    < end of copied text >    -CCTA:< from: CT Abdomen and Pelvis w/ IV Cont (01.11.19 @ 03:08) >    IMPRESSION:  No evidence of acute abdominopelvic pathology.    < end of copied text >    ECG: < from: 12 Lead ECG (01.10.19 @ 22:49) >  Diagnosis Line Normal sinus rhythm  Voltage criteria for left ventricular hypertrophy  Inferior infarct , age undetermined  Abnormal ECG    < end of copied text     echo 2015  Summary   Left ventricle: Systolic function was normal. Ejection fraction was estimated   in the range of 55 % to 65 %. There was hypokinesis of the basal inferoseptal   wall(s).   Mitral valve: There was mild annular calcification.     Clinical question: Assess left ventricular function.

## 2019-01-11 NOTE — ED PROVIDER NOTE - NS ED ROS FT
Review of Systems    Constitutional: (-) fever  Cardiovascular: (-) chest pain, (-) syncope  Respiratory: (-) cough, (-) shortness of breath  Gastrointestinal: As per HPI  Musculoskeletal: (-) neck pain, (-) back pain, (-) joint pain  Integumentary: (-) rash, (-) edema  Neurological: (-) headache, (-) altered mental status    Except as documented in the HPI, all other systems are negative.

## 2019-01-12 LAB — TSH SERPL-MCNC: 1.32 UIU/ML — SIGNIFICANT CHANGE UP (ref 0.27–4.2)

## 2019-01-15 DIAGNOSIS — E11.9 TYPE 2 DIABETES MELLITUS WITHOUT COMPLICATIONS: ICD-10-CM

## 2019-01-15 DIAGNOSIS — N40.0 BENIGN PROSTATIC HYPERPLASIA WITHOUT LOWER URINARY TRACT SYMPTOMS: ICD-10-CM

## 2019-01-15 DIAGNOSIS — I10 ESSENTIAL (PRIMARY) HYPERTENSION: ICD-10-CM

## 2019-01-15 DIAGNOSIS — K92.2 GASTROINTESTINAL HEMORRHAGE, UNSPECIFIED: ICD-10-CM

## 2019-01-15 DIAGNOSIS — K92.0 HEMATEMESIS: ICD-10-CM

## 2019-01-15 DIAGNOSIS — Z79.899 OTHER LONG TERM (CURRENT) DRUG THERAPY: ICD-10-CM

## 2019-01-15 DIAGNOSIS — Z95.1 PRESENCE OF AORTOCORONARY BYPASS GRAFT: ICD-10-CM

## 2019-01-15 DIAGNOSIS — Z79.84 LONG TERM (CURRENT) USE OF ORAL HYPOGLYCEMIC DRUGS: ICD-10-CM

## 2019-01-15 DIAGNOSIS — E78.5 HYPERLIPIDEMIA, UNSPECIFIED: ICD-10-CM

## 2019-01-15 DIAGNOSIS — I25.10 ATHEROSCLEROTIC HEART DISEASE OF NATIVE CORONARY ARTERY WITHOUT ANGINA PECTORIS: ICD-10-CM

## 2019-04-08 NOTE — ED PROVIDER NOTE - CPE EDP CARDIAC NORM
normal... cellphone, lap top, clothing, jacket, sweater, medication, one pair earring with white stone, bank card, drivers license./with patient

## 2019-10-26 ENCOUNTER — EMERGENCY (EMERGENCY)
Facility: HOSPITAL | Age: 64
LOS: 0 days | Discharge: HOME | End: 2019-10-27
Admitting: EMERGENCY MEDICINE
Payer: MEDICAID

## 2019-10-26 VITALS
SYSTOLIC BLOOD PRESSURE: 177 MMHG | HEIGHT: 67 IN | WEIGHT: 190.04 LBS | HEART RATE: 76 BPM | RESPIRATION RATE: 18 BRPM | DIASTOLIC BLOOD PRESSURE: 79 MMHG | TEMPERATURE: 97 F | OXYGEN SATURATION: 99 %
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
## 2019-10-26 DIAGNOSIS — Z88.8 ALLERGY STATUS TO OTHER DRUGS, MEDICAMENTS AND BIOLOGICAL SUBSTANCES STATUS: ICD-10-CM

## 2019-10-26 DIAGNOSIS — S61.012A LACERATION WITHOUT FOREIGN BODY OF LEFT THUMB WITHOUT DAMAGE TO NAIL, INITIAL ENCOUNTER: ICD-10-CM

## 2019-10-26 DIAGNOSIS — Z88.1 ALLERGY STATUS TO OTHER ANTIBIOTIC AGENTS STATUS: ICD-10-CM

## 2019-10-26 DIAGNOSIS — Z98.890 OTHER SPECIFIED POSTPROCEDURAL STATES: Chronic | ICD-10-CM

## 2019-10-26 PROCEDURE — 73130 X-RAY EXAM OF HAND: CPT | Mod: 26,LT

## 2019-10-26 PROCEDURE — 99283 EMERGENCY DEPT VISIT LOW MDM: CPT

## 2019-10-26 NOTE — ED PROVIDER NOTE - PATIENT PORTAL LINK FT
You can access the FollowMyHealth Patient Portal offered by [facility] by registering at the following website: [email or web address]. By joining [employer]’s FollowMyHealth portal, you will also be able to view your health information using other applications (apps) compatible with our system.

## 2019-10-26 NOTE — ED PROVIDER NOTE - NSFOLLOWUPINSTRUCTIONS_ED_ALL_ED_FT
Finger Laceration    WHAT YOU NEED TO KNOW:    What is a finger laceration? A finger laceration is a deep cut in your skin. Your blood vessels, bones, joints, tendons, or nerves may also be injured.    What are the signs and symptoms of a finger laceration? Your symptoms may depend on whether nerves, tendons, or deeper tissues were injured. You may have any of the following:     A cut, tear, or gash in your finger      Bleeding, swelling, or pain      Numbness or tingling in your finger      Trouble moving your finger    How is a finger laceration diagnosed? Tell your healthcare provider how you got your laceration. Your healthcare provider will examine your laceration and decide what treatment you need. An x-ray, ultrasound, or CT may show foreign objects in the wound. Foreign objects include metal, gravel, and glass. The tests may also show damage to deeper tissues. You may be given contrast liquid to help the injured area show up better in the pictures. Tell the healthcare provider if you have ever had an allergic reaction to contrast liquid.    How is a finger laceration treated? Treatment depends on how large and deep the laceration is. It also depends on whether you have damage to deeper tissues. You may need any of the following:     Pressure may be applied to stop any bleeding.      Wound cleaning may be needed to remove dirt or debris. This will decrease the risk of infection. Your healthcare provider may need to look in your laceration for foreign objects or damage to deeper tissues. Before your laceration is cleaned and checked, you may be given medicine to numb the area. You may also be given medicine to help you relax.      Wound closure with stitches, medical glue, or Steri-Strips™ may be needed. These help the wound close and heal. A splint may be placed over your stitches, glue, or Steri-Strips. This will help decrease stress on the wound and prevent it from coming apart.           Medicine may be given to treat pain or decrease your risk for infection. You may also be given a tetanus shot. Your healthcare provider will decide if you need a tetanus shot. Wounds at high risk for tetanus infection include wounds with dirt or saliva in them. Tell your healthcare provider if you have had the tetanus vaccine or a booster within the last 5 years.      Surgery may be needed to clean your wound and remove foreign objects. Surgery may also be needed to repair injuries to tendons, nerves, or bones.    What can I do to manage my symptoms?     Apply ice on your finger for 15 to 20 minutes every hour or as directed. Use an ice pack, or put crushed ice in a plastic bag. Cover it with a towel before you apply it to your skin. Ice helps prevent tissue damage and decreases swelling and pain.      Elevate your hand above the level of your heart as often as you can. This will help decrease swelling and pain. Prop your hand on pillows or blankets to keep it elevated comfortably.      Wear your splint as directed. A splint will decrease movement and stress on your wound. The splint may help your wound heal faster. Ask your healthcare provider how to apply and remove a splint.      Apply ointments to decrease scarring. Do not apply ointments until your healthcare provider says it is okay. You may need to wait until your wound is healed. Ask which ointment to buy and how often to use it.    When should I seek immediate care?     Your wound comes apart.      Blood soaks through your bandage.      You have severe pain in your finger or hand.      Your finger is pale and cold.      You have sudden trouble moving your finger.      Your swelling suddenly gets worse.      You have red streaks on your skin coming from your wound.    When should I call my doctor or hand specialist?     You have new numbness or tingling.      Your finger feels warm, looks swollen or red, and is draining pus.      You have a fever.      You have questions or concerns about your condition or care.    CARE AGREEMENT:    You have the right to help plan your care. Learn about your health condition and how it may be treated. Discuss treatment options with your healthcare providers to decide what care you want to receive. You always have the right to refuse treatment.

## 2019-10-26 NOTE — ED PROVIDER NOTE - CLINICAL SUMMARY MEDICAL DECISION MAKING FREE TEXT BOX
Pt with fingertip laceration of left thumb. No active bleeding. Bleeding subside with pressure. No stitches needs fingertip avulsed. Xeroform and gauze applied. Antibiotics given. Advised hand surgery fu for follow up. I have discussed the discharge plan with the patient. The patient agrees with the plan, as discussed.  The patient understands Emergency Department diagnosis is a preliminary diagnosis often based on limited information and that the patient must adhere to the follow-up plan as discussed.  The patient understands that if the symptoms worsen or if prescribed medications do not have the desired/planned effect that the patient may return to the Emergency Department at any time for further evaluation and treatment. Pt with fingertip laceration of left thumb. No active bleeding. Bleeding subside with pressure. No laceration repair needed - fingertip avulsed. Xeroform and gauze applied. Antibiotics given. Advised hand surgery fu for follow up. I have discussed the discharge plan with the patient. The patient agrees with the plan, as discussed.  The patient understands Emergency Department diagnosis is a preliminary diagnosis often based on limited information and that the patient must adhere to the follow-up plan as discussed.  The patient understands that if the symptoms worsen or if prescribed medications do not have the desired/planned effect that the patient may return to the Emergency Department at any time for further evaluation and treatment.

## 2019-10-26 NOTE — ED PROVIDER NOTE - CARE PROVIDER_API CALL
Vinicius Light (DO)  Plastic Surgery  [address] [place] [address]  Phone: [phone]  Fax: [phone]  Follow Up Time: 1-3 Days

## 2019-10-26 NOTE — ED PROVIDER NOTE - NS ED ROS FT
Review of Systems:  CONSTITUTIONAL - no fever, no diaphoresis, no chills  SKIN - no rash, (+) laceration  HEMATOLOGIC - no bleeding, no bruising  EYES - no eye pain, no blurry vision  ENT - no congestion  RESPIRATORY - no shortness of breath, no cough  CARDIAC - no chest pain, no palpitations  GI - no abd pain, no nausea, no vomiting, no diarrhea, no constipation  GENITO-URINARY - no dysuria; no hematuria, no increased urinary frequency  MUSCULOSKELETAL - no joint paint, no swelling, no redness  NEUROLOGIC - no weakness, no headache, no paresthesias, no LOC  PSYCH - no anxiety, non suicidal, non homicidal, no hallucination, no depression  All other ROS are negative except as documented in HPI. Review of Systems:  CONSTITUTIONAL - no fever, no diaphoresis, no chills  SKIN - no rash, (+) laceration  HEMATOLOGIC - (+) bleeding, no bruising  MUSCULOSKELETAL - no joint paint, no swelling, no redness  NEUROLOGIC - no weakness, no paresthesias  All other ROS are negative except as documented in HPI.

## 2019-10-26 NOTE — ED PROVIDER NOTE - PHYSICAL EXAMINATION
VITAL SIGNS: I have reviewed nursing notes and confirm.  CONSTITUTIONAL: Well-developed; well-nourished; in no acute distress.  SKIN: Skin exam is warm and dry, no acute rash. Distal fingertip laceration involving nail. No bone, muscle, or tendon exposed. FROM of thumb. Neurovascularly intact.   HEAD: Normocephalic; atraumatic.  EYES: PERRL, EOM intact; conjunctiva and sclera clear.  ENT: No nasal discharge; airway clear. TMs clear.  NECK: Supple; non tender.  CARD: S1, S2 normal; no murmurs, gallops, or rubs. Regular rate and rhythm.  RESP: No wheezes, rales or rhonchi.  ABD: Normal bowel sounds; soft; non-distended; non-tender; no hepatosplenomegaly.  EXT: Normal ROM. No clubbing, cyanosis or edema.  LYMPH: No acute cervical adenopathy.  NEURO: Alert, oriented. Grossly unremarkable. No focal deficits.  PSYCH: Cooperative, appropriate. VITAL SIGNS: I have reviewed nursing notes and confirm.  CONSTITUTIONAL: Well-developed; well-nourished; in no acute distress.  SKIN: Distal fingertip laceration of left thumb. No bone, muscle, or tendon exposed. FROM of thumb. Neurovascularly intact.   HEAD: Normocephalic; atraumatic.  EYES: PERRL, EOM intact; conjunctiva and sclera clear.  EXT: Normal ROM. No clubbing, cyanosis or edema.  NEURO: Alert, oriented. Grossly unremarkable. No focal deficits.  PSYCH: Cooperative, appropriate.

## 2019-10-26 NOTE — ED PROVIDER NOTE - OBJECTIVE STATEMENT
65 y/o M PMH DM, HTN p/w L thumb injury after cutting salad with sharp knife. Reports bleeding to site. Sx are moderate. Reports pain to finger. No alleviating or aggravating factors. No decreased ROM of finger. No numbness, tingling, weakness, fever, chills, or drainage. 65 y/o M with pmhx of CAD, DM, HTN p/w L thumb injury after cutting salad with sharp knife. Reports bleeding to site. Sx are moderate. Reports pain to finger. No alleviating or aggravating factors. No decreased ROM of finger. No numbness, tingling, weakness, fever, chills, or drainage.

## 2020-01-07 ENCOUNTER — EMERGENCY (EMERGENCY)
Facility: HOSPITAL | Age: 65
LOS: 0 days | Discharge: HOME | End: 2020-01-07
Admitting: EMERGENCY MEDICINE
Payer: MEDICAID

## 2020-01-07 VITALS
RESPIRATION RATE: 18 BRPM | SYSTOLIC BLOOD PRESSURE: 135 MMHG | DIASTOLIC BLOOD PRESSURE: 78 MMHG | HEART RATE: 73 BPM | OXYGEN SATURATION: 98 % | TEMPERATURE: 98 F

## 2020-01-07 VITALS — WEIGHT: 190.04 LBS | HEIGHT: 67 IN

## 2020-01-07 DIAGNOSIS — Z88.1 ALLERGY STATUS TO OTHER ANTIBIOTIC AGENTS STATUS: ICD-10-CM

## 2020-01-07 DIAGNOSIS — M25.569 PAIN IN UNSPECIFIED KNEE: ICD-10-CM
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
## 2020-01-07 DIAGNOSIS — Z98.890 OTHER SPECIFIED POSTPROCEDURAL STATES: Chronic | ICD-10-CM

## 2020-01-07 DIAGNOSIS — Z88.8 ALLERGY STATUS TO OTHER DRUGS, MEDICAMENTS AND BIOLOGICAL SUBSTANCES STATUS: ICD-10-CM

## 2020-01-07 DIAGNOSIS — M25.561 PAIN IN RIGHT KNEE: ICD-10-CM
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
## 2020-01-07 PROCEDURE — 73562 X-RAY EXAM OF KNEE 3: CPT | Mod: 26,RT

## 2020-01-07 PROCEDURE — 99283 EMERGENCY DEPT VISIT LOW MDM: CPT

## 2020-01-07 NOTE — ED PROVIDER NOTE - NS ED ROS FT
CONST: No fever, chills or bodyaches  EYES: No pain, redness, drainage or visual changes.  ENT: No ear pain or discharge, nasal discharge or congestion. No sore throat  CARD: No chest pain, palpitations  RESP: No SOB, cough, hemoptysis. No hx of asthma or COPD  GI: No abdominal pain, N/V/D  MS: + right knee pain, no hip pain. No back pain  SKIN: No rashes  NEURO: No headache, dizziness, paresthesias

## 2020-01-07 NOTE — ED PROVIDER NOTE - PATIENT PORTAL LINK FT
You can access the FollowMyHealth Patient Portal offered by [facility] by registering at the following website: [email or web address]. By joining [employer] [facility]’s FollowMyHealth portal, you will also be able to view your health information using other applications (apps) compatible with our system.

## 2020-01-07 NOTE — ED PROVIDER NOTE - PHYSICAL EXAMINATION
CONST: Well appearing in NAD  EYES: Sclera and conjunctiva clear.  MS: + mild TTP right lateral knee pain, no deformity, FROM without difficulty, no pain with ROM, no laxity. Normal ROM in all extremities. No edema of lower extremities, no calf pain  SKIN: Warm, dry, no acute rashes. Good turgor  NEURO: A&Ox3, No focal deficits. Strength 5/5 with no sensory deficits. Steady gait

## 2020-01-07 NOTE — ED PROVIDER NOTE - CLINICAL SUMMARY MEDICAL DECISION MAKING FREE TEXT BOX
no acute fx, f/u with pmd and ortho.  I have discussed the discharge plan with the patient. The patient agrees with the plan, as discussed.  The patient understands Emergency Department diagnosis is a preliminary diagnosis often based on limited information and that the patient must adhere to the follow-up plan as discussed.  The patient understands that if the symptoms worsen or if prescribed medications do not have the desired/planned effect that the patient may return to the Emergency Department at any time for further evaluation and treatment.

## 2020-01-07 NOTE — ED PROVIDER NOTE - OBJECTIVE STATEMENT
64 y.o male w/ no sig pmhx presents to the ED for evaluation of R knee pain x 1 day.  mechanical trip and fall on R knee.  Acute pain, mild severity, no radiation of pain, worse w/ ambulation, alleviated with rest. Denies head injury, LOC, upper extremity pain, paresthesias, hip pain.  No further complaints.

## 2020-01-07 NOTE — ED ADULT NURSE NOTE - DISCHARGE DATE/TIME
UA and Urine culture orders from Dr. Peña canceled per his instruction in telephone encounter 3/22/18.   07-Jan-2020 02:44

## 2020-01-07 NOTE — ED ADULT NURSE NOTE - HOW MANY DRINKS CONTAINING ALCOHOL DO YOU HAVE ON A TYPICAL DAY WHEN YOU ARE DRINKING?
Abdominal Paracentesis     WHAT YOU NEED TO KNOW:   Abdominal paracentesis is a procedure to remove abnormal fluid buildup in your abdomen  Fluid builds up because of liver problems, such as swelling and scarring  Heart failure, kidney disease, a mass, or problems with your pancreas may also cause fluid buildup  DISCHARGE INSTRUCTIONS:     Follow up with your healthcare provider as directed: Write down your questions so you remember to ask them during your visits  Wound care: Remove dressing after 24 hours  Leave glue in place  Return to your normal activities    Contact Interventional Radiology at [phone] [place] PATIENTS: Contact Interventional Radiology at [phone]) [place] PATIENTS: Contact Interventional Radiology at [phone]) if:  · You have a fever and your wound is red and swollen  · You have yellow, green, or bad-smelling discharge coming from your wound  · You have pain or swelling in your abdomen  · You have an upset stomach or you vomit  · You have sudden, sharp pain in your abdomen  · You urinate very little or not at all  · You feel confused and more tired than usual    · Your arm or leg feels warm, tender, and painful  It may look swollen and red  · You suddenly feel lightheaded and have trouble breathing  1 or 2

## 2020-01-07 NOTE — ED PROVIDER NOTE - CARE PROVIDER_API CALL
Reuben Clements (MD)  Orthopaedic Surgery  [address] [place] [address]  Phone: [phone]  Fax: [phone]  Follow Up Time: 1-3 Days

## 2020-01-07 NOTE — ED ADULT NURSE NOTE - NSIMPLEMENTINTERV_GEN_ALL_ED
Implemented All Universal Safety Interventions:  [place] to call system. Call bell, personal items and telephone within reach. Instruct patient to call for assistance. Room bathroom lighting operational. Non-slip footwear when patient is off stretcher. Physically safe environment: no spills, clutter or unnecessary equipment. Stretcher in lowest position, wheels locked, appropriate side rails in place.

## 2020-01-07 NOTE — ED PROVIDER NOTE - NSFOLLOWUPINSTRUCTIONS_ED_ALL_ED_FT

## 2020-05-29 ENCOUNTER — INPATIENT (INPATIENT)
Facility: HOSPITAL | Age: 65
LOS: 2 days | Discharge: HOME | End: 2020-06-01
Attending: INTERNAL MEDICINE | Admitting: INTERNAL MEDICINE
Payer: MEDICAID

## 2020-05-29 VITALS
OXYGEN SATURATION: 98 % | SYSTOLIC BLOOD PRESSURE: 89 MMHG | WEIGHT: 164.91 LBS | TEMPERATURE: 99 F | HEIGHT: 68 IN | RESPIRATION RATE: 17 BRPM | DIASTOLIC BLOOD PRESSURE: 53 MMHG | HEART RATE: 60 BPM

## 2020-05-29 DIAGNOSIS — Z79.82 LONG TERM (CURRENT) USE OF ASPIRIN: ICD-10-CM

## 2020-05-29 DIAGNOSIS — Z88.6 ALLERGY STATUS TO ANALGESIC AGENT: ICD-10-CM

## 2020-05-29 DIAGNOSIS — Z87.11 PERSONAL HISTORY OF PEPTIC ULCER DISEASE: ICD-10-CM

## 2020-05-29 DIAGNOSIS — Z79.899 OTHER LONG TERM (CURRENT) DRUG THERAPY: ICD-10-CM

## 2020-05-29 DIAGNOSIS — I10 ESSENTIAL (PRIMARY) HYPERTENSION: ICD-10-CM

## 2020-05-29 DIAGNOSIS — Z88.5 ALLERGY STATUS TO NARCOTIC AGENT: ICD-10-CM

## 2020-05-29 DIAGNOSIS — Z79.4 LONG TERM (CURRENT) USE OF INSULIN: ICD-10-CM

## 2020-05-29 DIAGNOSIS — Z98.890 OTHER SPECIFIED POSTPROCEDURAL STATES: Chronic | ICD-10-CM

## 2020-05-29 DIAGNOSIS — R19.7 DIARRHEA, UNSPECIFIED: ICD-10-CM

## 2020-05-29 DIAGNOSIS — Z88.8 ALLERGY STATUS TO OTHER DRUGS, MEDICAMENTS AND BIOLOGICAL SUBSTANCES STATUS: ICD-10-CM

## 2020-05-29 DIAGNOSIS — Z88.0 ALLERGY STATUS TO PENICILLIN: ICD-10-CM

## 2020-05-29 LAB
ALBUMIN SERPL ELPH-MCNC: 4.3 G/DL — SIGNIFICANT CHANGE UP (ref 3.5–5.2)
ALP SERPL-CCNC: 72 U/L — SIGNIFICANT CHANGE UP (ref 30–115)
ALT FLD-CCNC: 19 U/L — SIGNIFICANT CHANGE UP (ref 0–41)
ANION GAP SERPL CALC-SCNC: 17 MMOL/L — HIGH (ref 7–14)
AST SERPL-CCNC: 16 U/L — SIGNIFICANT CHANGE UP (ref 0–41)
BASE EXCESS BLDV CALC-SCNC: -0.2 MMOL/L — SIGNIFICANT CHANGE UP (ref -2–2)
BILIRUB SERPL-MCNC: 0.8 MG/DL — SIGNIFICANT CHANGE UP (ref 0.2–1.2)
BUN SERPL-MCNC: 21 MG/DL — HIGH (ref 10–20)
CA-I SERPL-SCNC: 1.21 MMOL/L — SIGNIFICANT CHANGE UP (ref 1.12–1.3)
CALCIUM SERPL-MCNC: 9.6 MG/DL — SIGNIFICANT CHANGE UP (ref 8.5–10.1)
CHLORIDE SERPL-SCNC: 98 MMOL/L — SIGNIFICANT CHANGE UP (ref 98–110)
CO2 SERPL-SCNC: 24 MMOL/L — SIGNIFICANT CHANGE UP (ref 17–32)
CREAT SERPL-MCNC: 3.1 MG/DL — HIGH (ref 0.7–1.5)
GAS PNL BLDV: 137 MMOL/L — SIGNIFICANT CHANGE UP (ref 136–145)
GAS PNL BLDV: SIGNIFICANT CHANGE UP
GLUCOSE SERPL-MCNC: 177 MG/DL — HIGH (ref 70–99)
HCO3 BLDV-SCNC: 26 MMOL/L — SIGNIFICANT CHANGE UP (ref 22–29)
HCT VFR BLD CALC: 44.5 % — SIGNIFICANT CHANGE UP (ref 42–52)
HCT VFR BLDA CALC: 48 % — HIGH (ref 34–44)
HGB BLD CALC-MCNC: 15.7 G/DL — SIGNIFICANT CHANGE UP (ref 14–18)
HGB BLD-MCNC: 15.5 G/DL — SIGNIFICANT CHANGE UP (ref 14–18)
LACTATE BLDV-MCNC: 3.1 MMOL/L — HIGH (ref 0.5–1.6)
MAGNESIUM SERPL-MCNC: 2 MG/DL — SIGNIFICANT CHANGE UP (ref 1.8–2.4)
MCHC RBC-ENTMCNC: 31.1 PG — HIGH (ref 27–31)
MCHC RBC-ENTMCNC: 34.8 G/DL — SIGNIFICANT CHANGE UP (ref 32–37)
MCV RBC AUTO: 89.2 FL — SIGNIFICANT CHANGE UP (ref 80–94)
NRBC # BLD: 0 /100 WBCS — SIGNIFICANT CHANGE UP (ref 0–0)
NT-PROBNP SERPL-SCNC: 117 PG/ML — SIGNIFICANT CHANGE UP (ref 0–300)
PCO2 BLDV: 49 MMHG — SIGNIFICANT CHANGE UP (ref 41–51)
PH BLDV: 7.34 — SIGNIFICANT CHANGE UP (ref 7.26–7.43)
PLATELET # BLD AUTO: 177 K/UL — SIGNIFICANT CHANGE UP (ref 130–400)
PO2 BLDV: 26 MMHG — SIGNIFICANT CHANGE UP (ref 20–40)
POTASSIUM BLDV-SCNC: 4.6 MMOL/L — SIGNIFICANT CHANGE UP (ref 3.3–5.6)
POTASSIUM SERPL-MCNC: 4.8 MMOL/L — SIGNIFICANT CHANGE UP (ref 3.5–5)
POTASSIUM SERPL-SCNC: 4.8 MMOL/L — SIGNIFICANT CHANGE UP (ref 3.5–5)
PROT SERPL-MCNC: 7.1 G/DL — SIGNIFICANT CHANGE UP (ref 6–8)
RBC # BLD: 4.99 M/UL — SIGNIFICANT CHANGE UP (ref 4.7–6.1)
RBC # FLD: 14 % — SIGNIFICANT CHANGE UP (ref 11.5–14.5)
SAO2 % BLDV: 41 % — SIGNIFICANT CHANGE UP
SODIUM SERPL-SCNC: 139 MMOL/L — SIGNIFICANT CHANGE UP (ref 135–146)
TROPONIN T SERPL-MCNC: 0.02 NG/ML — HIGH
WBC # BLD: 9.42 K/UL — SIGNIFICANT CHANGE UP (ref 4.8–10.8)
WBC # FLD AUTO: 9.42 K/UL — SIGNIFICANT CHANGE UP (ref 4.8–10.8)

## 2020-05-29 PROCEDURE — 71045 X-RAY EXAM CHEST 1 VIEW: CPT | Mod: 26

## 2020-05-29 PROCEDURE — 93010 ELECTROCARDIOGRAM REPORT: CPT

## 2020-05-29 PROCEDURE — 99291 CRITICAL CARE FIRST HOUR: CPT

## 2020-05-29 RX ORDER — SODIUM CHLORIDE 9 MG/ML
2000 INJECTION, SOLUTION INTRAVENOUS ONCE
Refills: 0 | Status: COMPLETED | OUTPATIENT
Start: 2020-05-29 | End: 2020-05-29

## 2020-05-29 RX ADMIN — SODIUM CHLORIDE 3000 MILLILITER(S): 9 INJECTION, SOLUTION INTRAVENOUS at 22:30

## 2020-05-29 RX ADMIN — SODIUM CHLORIDE 2000 MILLILITER(S): 9 INJECTION, SOLUTION INTRAVENOUS at 23:00

## 2020-05-29 NOTE — ED PROVIDER NOTE - OBJECTIVE STATEMENT
66 yo male with hx of cad s/p cabg, diabetes, htn presenting with nonexertional, chest pressure-like pain for 1 day, radiating to shoulders associated with nausea and intermittent epigastric abdominal pain. denies sob, cough, vomiting, diarrhea, weakness, numbness, tingling, dysuria.

## 2020-05-29 NOTE — ED PROVIDER NOTE - NS ED ROS FT
Constitutional:  see HPI  Head:  no headache, dizziness, loss of consciousness  Eyes:  no visual changes; no eye pain, redness, or discharge  ENMT:  no ear pain or discharge; no hearing problems; no mouth or throat sores or lesions; no throat pain  Cardiac: chest pain  Respiratory: no cough, wheezing, shortness of breath, or trouble breathing  GI: nausea, abdominal pain; no vomiting, diarrhea   :  no dysuria, frequency, or burning with urination;   MS: no myalgias, muscle weakness, joint pain,or  injury; no joint swelling  Neuro: no weakness; no numbness or tingling; no seizure  Skin:  no rashes or color changes; no lacerations or abrasions

## 2020-05-29 NOTE — ED ADULT TRIAGE NOTE - CHIEF COMPLAINT QUOTE
He was outside all day with minimal food or water. He's complaining of shoulder pressure/heaviness. He was hypotensive on scene - EMS

## 2020-05-29 NOTE — ED PROVIDER NOTE - ATTENDING CONTRIBUTION TO CARE
68 M past medical history Hypertension, HLD, diabetes type 2, Coronary Artery Disease presents with chest pain and shoulder pain x 1-2 days.  denies fevers, chills or other symptoms.  No dysuria or difficulty urinating.  On exam vs initially hypotensive, improved with IVF.  No other VS abnormalities.  heart and lungs normal.  bladder non-distended.  sono shows no retention.  labs show neg trop and AMBER.  plan is admit to [facility] for ACS.  low suspicion of COVID-19.

## 2020-05-30 LAB
24R-OH-CALCIDIOL SERPL-MCNC: 34 NG/ML — SIGNIFICANT CHANGE UP (ref 30–80)
A1C WITH ESTIMATED AVERAGE GLUCOSE RESULT: 6.8 % — HIGH (ref 4–5.6)
ALBUMIN SERPL ELPH-MCNC: 3.8 G/DL — SIGNIFICANT CHANGE UP (ref 3.5–5.2)
ALP SERPL-CCNC: 67 U/L — SIGNIFICANT CHANGE UP (ref 30–115)
ALT FLD-CCNC: 18 U/L — SIGNIFICANT CHANGE UP (ref 0–41)
ANION GAP SERPL CALC-SCNC: 15 MMOL/L — HIGH (ref 7–14)
APTT BLD: 31.1 SEC — SIGNIFICANT CHANGE UP (ref 27–39.2)
AST SERPL-CCNC: 17 U/L — SIGNIFICANT CHANGE UP (ref 0–41)
BASOPHILS # BLD AUTO: 0.01 K/UL — SIGNIFICANT CHANGE UP (ref 0–0.2)
BASOPHILS NFR BLD AUTO: 0.2 % — SIGNIFICANT CHANGE UP (ref 0–1)
BILIRUB SERPL-MCNC: 0.5 MG/DL — SIGNIFICANT CHANGE UP (ref 0.2–1.2)
BLD GP AB SCN SERPL QL: SIGNIFICANT CHANGE UP
BUN SERPL-MCNC: 21 MG/DL — HIGH (ref 10–20)
CALCIUM SERPL-MCNC: 9.4 MG/DL — SIGNIFICANT CHANGE UP (ref 8.5–10.1)
CHLORIDE SERPL-SCNC: 102 MMOL/L — SIGNIFICANT CHANGE UP (ref 98–110)
CHOLEST SERPL-MCNC: 171 MG/DL — SIGNIFICANT CHANGE UP (ref 100–200)
CK MB CFR SERPL CALC: 8.4 NG/ML — HIGH (ref 0.6–6.3)
CK SERPL-CCNC: 253 U/L — HIGH (ref 0–225)
CO2 SERPL-SCNC: 23 MMOL/L — SIGNIFICANT CHANGE UP (ref 17–32)
CREAT SERPL-MCNC: 2 MG/DL — HIGH (ref 0.7–1.5)
EOSINOPHIL # BLD AUTO: 0.08 K/UL — SIGNIFICANT CHANGE UP (ref 0–0.7)
EOSINOPHIL NFR BLD AUTO: 1.4 % — SIGNIFICANT CHANGE UP (ref 0–8)
ESTIMATED AVERAGE GLUCOSE: 148 MG/DL — HIGH (ref 68–114)
GLUCOSE BLDC GLUCOMTR-MCNC: 114 MG/DL — HIGH (ref 70–99)
GLUCOSE BLDC GLUCOMTR-MCNC: 115 MG/DL — HIGH (ref 70–99)
GLUCOSE BLDC GLUCOMTR-MCNC: 141 MG/DL — HIGH (ref 70–99)
GLUCOSE BLDC GLUCOMTR-MCNC: 192 MG/DL — HIGH (ref 70–99)
GLUCOSE BLDC GLUCOMTR-MCNC: 246 MG/DL — HIGH (ref 70–99)
GLUCOSE SERPL-MCNC: 179 MG/DL — HIGH (ref 70–99)
HCT VFR BLD CALC: 42 % — SIGNIFICANT CHANGE UP (ref 42–52)
HDLC SERPL-MCNC: 39 MG/DL — LOW
HGB BLD-MCNC: 14.4 G/DL — SIGNIFICANT CHANGE UP (ref 14–18)
IMM GRANULOCYTES NFR BLD AUTO: 0.2 % — SIGNIFICANT CHANGE UP (ref 0.1–0.3)
INR BLD: 1.1 RATIO — SIGNIFICANT CHANGE UP (ref 0.65–1.3)
LIPID PNL WITH DIRECT LDL SERPL: 110 MG/DL — SIGNIFICANT CHANGE UP (ref 4–129)
LYMPHOCYTES # BLD AUTO: 1.98 K/UL — SIGNIFICANT CHANGE UP (ref 1.2–3.4)
LYMPHOCYTES # BLD AUTO: 35.4 % — SIGNIFICANT CHANGE UP (ref 20.5–51.1)
MAGNESIUM SERPL-MCNC: 1.9 MG/DL — SIGNIFICANT CHANGE UP (ref 1.8–2.4)
MCHC RBC-ENTMCNC: 30.5 PG — SIGNIFICANT CHANGE UP (ref 27–31)
MCHC RBC-ENTMCNC: 34.3 G/DL — SIGNIFICANT CHANGE UP (ref 32–37)
MCV RBC AUTO: 89 FL — SIGNIFICANT CHANGE UP (ref 80–94)
MONOCYTES # BLD AUTO: 0.48 K/UL — SIGNIFICANT CHANGE UP (ref 0.1–0.6)
MONOCYTES NFR BLD AUTO: 8.6 % — SIGNIFICANT CHANGE UP (ref 1.7–9.3)
NEUTROPHILS # BLD AUTO: 3.03 K/UL — SIGNIFICANT CHANGE UP (ref 1.4–6.5)
NEUTROPHILS NFR BLD AUTO: 54.2 % — SIGNIFICANT CHANGE UP (ref 42.2–75.2)
NRBC # BLD: 0 /100 WBCS — SIGNIFICANT CHANGE UP (ref 0–0)
PLATELET # BLD AUTO: 165 K/UL — SIGNIFICANT CHANGE UP (ref 130–400)
POTASSIUM SERPL-MCNC: 4.1 MMOL/L — SIGNIFICANT CHANGE UP (ref 3.5–5)
POTASSIUM SERPL-SCNC: 4.1 MMOL/L — SIGNIFICANT CHANGE UP (ref 3.5–5)
PROT SERPL-MCNC: 6.3 G/DL — SIGNIFICANT CHANGE UP (ref 6–8)
PROTHROM AB SERPL-ACNC: 12.7 SEC — SIGNIFICANT CHANGE UP (ref 9.95–12.87)
RBC # BLD: 4.72 M/UL — SIGNIFICANT CHANGE UP (ref 4.7–6.1)
RBC # FLD: 14.2 % — SIGNIFICANT CHANGE UP (ref 11.5–14.5)
SARS-COV-2 RNA SPEC QL NAA+PROBE: SIGNIFICANT CHANGE UP
SODIUM SERPL-SCNC: 140 MMOL/L — SIGNIFICANT CHANGE UP (ref 135–146)
TOTAL CHOLESTEROL/HDL RATIO MEASUREMENT: 4.4 RATIO — SIGNIFICANT CHANGE UP (ref 4–5.5)
TRIGL SERPL-MCNC: 170 MG/DL — HIGH (ref 10–149)
TROPONIN T SERPL-MCNC: <0.01 NG/ML — SIGNIFICANT CHANGE UP
WBC # BLD: 5.59 K/UL — SIGNIFICANT CHANGE UP (ref 4.8–10.8)
WBC # FLD AUTO: 5.59 K/UL — SIGNIFICANT CHANGE UP (ref 4.8–10.8)

## 2020-05-30 PROCEDURE — 99223 1ST HOSP IP/OBS HIGH 75: CPT

## 2020-05-30 PROCEDURE — 93010 ELECTROCARDIOGRAM REPORT: CPT

## 2020-05-30 PROCEDURE — 76700 US EXAM ABDOM COMPLETE: CPT | Mod: 26

## 2020-05-30 RX ORDER — GEMFIBROZIL 600 MG
1 TABLET ORAL
Qty: 0 | Refills: 0 | DISCHARGE

## 2020-05-30 RX ORDER — INSULIN GLARGINE 100 [IU]/ML
20 INJECTION, SOLUTION SUBCUTANEOUS AT BEDTIME
Refills: 0 | Status: DISCONTINUED | OUTPATIENT
Start: 2020-05-30 | End: 2020-06-01

## 2020-05-30 RX ORDER — PANTOPRAZOLE SODIUM 20 MG/1
40 TABLET, DELAYED RELEASE ORAL
Refills: 0 | Status: DISCONTINUED | OUTPATIENT
Start: 2020-05-30 | End: 2020-06-01

## 2020-05-30 RX ORDER — TAMSULOSIN HYDROCHLORIDE 0.4 MG/1
0.4 CAPSULE ORAL AT BEDTIME
Refills: 0 | Status: DISCONTINUED | OUTPATIENT
Start: 2020-05-30 | End: 2020-06-01

## 2020-05-30 RX ORDER — HEPARIN SODIUM 5000 [USP'U]/ML
5000 INJECTION INTRAVENOUS; SUBCUTANEOUS EVERY 8 HOURS
Refills: 0 | Status: DISCONTINUED | OUTPATIENT
Start: 2020-05-30 | End: 2020-06-01

## 2020-05-30 RX ORDER — SIMVASTATIN 20 MG/1
1 TABLET, FILM COATED ORAL
Qty: 0 | Refills: 0 | DISCHARGE

## 2020-05-30 RX ORDER — CYCLOBENZAPRINE HYDROCHLORIDE 10 MG/1
10 TABLET, FILM COATED ORAL
Qty: 0 | Refills: 0 | DISCHARGE

## 2020-05-30 RX ORDER — LOSARTAN POTASSIUM 100 MG/1
25 TABLET, FILM COATED ORAL DAILY
Refills: 0 | Status: DISCONTINUED | OUTPATIENT
Start: 2020-05-30 | End: 2020-06-01

## 2020-05-30 RX ORDER — CARVEDILOL PHOSPHATE 80 MG/1
3.12 CAPSULE, EXTENDED RELEASE ORAL EVERY 12 HOURS
Refills: 0 | Status: DISCONTINUED | OUTPATIENT
Start: 2020-05-30 | End: 2020-06-01

## 2020-05-30 RX ORDER — ATORVASTATIN CALCIUM 80 MG/1
80 TABLET, FILM COATED ORAL AT BEDTIME
Refills: 0 | Status: DISCONTINUED | OUTPATIENT
Start: 2020-05-30 | End: 2020-06-01

## 2020-05-30 RX ORDER — LISINOPRIL 2.5 MG/1
1 TABLET ORAL
Qty: 0 | Refills: 0 | DISCHARGE

## 2020-05-30 RX ORDER — SITAGLIPTIN 50 MG/1
1 TABLET, FILM COATED ORAL
Qty: 0 | Refills: 0 | DISCHARGE

## 2020-05-30 RX ORDER — NITROGLYCERIN 6.5 MG
0.4 CAPSULE, EXTENDED RELEASE ORAL
Refills: 0 | Status: DISCONTINUED | OUTPATIENT
Start: 2020-05-30 | End: 2020-06-01

## 2020-05-30 RX ORDER — SODIUM CHLORIDE 9 MG/ML
1000 INJECTION, SOLUTION INTRAVENOUS
Refills: 0 | Status: DISCONTINUED | OUTPATIENT
Start: 2020-05-30 | End: 2020-06-01

## 2020-05-30 RX ORDER — SIMETHICONE 80 MG/1
80 TABLET, CHEWABLE ORAL THREE TIMES A DAY
Refills: 0 | Status: DISCONTINUED | OUTPATIENT
Start: 2020-05-30 | End: 2020-06-01

## 2020-05-30 RX ORDER — INSULIN LISPRO 100/ML
VIAL (ML) SUBCUTANEOUS
Refills: 0 | Status: DISCONTINUED | OUTPATIENT
Start: 2020-05-30 | End: 2020-06-01

## 2020-05-30 RX ORDER — ASPIRIN/CALCIUM CARB/MAGNESIUM 324 MG
81 TABLET ORAL DAILY
Refills: 0 | Status: DISCONTINUED | OUTPATIENT
Start: 2020-05-30 | End: 2020-06-01

## 2020-05-30 RX ORDER — ZOLPIDEM TARTRATE 10 MG/1
1 TABLET ORAL
Qty: 0 | Refills: 0 | DISCHARGE

## 2020-05-30 RX ADMIN — ATORVASTATIN CALCIUM 80 MILLIGRAM(S): 80 TABLET, FILM COATED ORAL at 22:02

## 2020-05-30 RX ADMIN — LOSARTAN POTASSIUM 25 MILLIGRAM(S): 100 TABLET, FILM COATED ORAL at 04:53

## 2020-05-30 RX ADMIN — CARVEDILOL PHOSPHATE 3.12 MILLIGRAM(S): 80 CAPSULE, EXTENDED RELEASE ORAL at 18:05

## 2020-05-30 RX ADMIN — HEPARIN SODIUM 5000 UNIT(S): 5000 INJECTION INTRAVENOUS; SUBCUTANEOUS at 22:01

## 2020-05-30 RX ADMIN — Medication 81 MILLIGRAM(S): at 12:31

## 2020-05-30 RX ADMIN — TAMSULOSIN HYDROCHLORIDE 0.4 MILLIGRAM(S): 0.4 CAPSULE ORAL at 22:02

## 2020-05-30 RX ADMIN — Medication 1: at 12:43

## 2020-05-30 RX ADMIN — SODIUM CHLORIDE 75 MILLILITER(S): 9 INJECTION, SOLUTION INTRAVENOUS at 02:02

## 2020-05-30 RX ADMIN — PANTOPRAZOLE SODIUM 40 MILLIGRAM(S): 20 TABLET, DELAYED RELEASE ORAL at 04:53

## 2020-05-30 RX ADMIN — CARVEDILOL PHOSPHATE 3.12 MILLIGRAM(S): 80 CAPSULE, EXTENDED RELEASE ORAL at 04:52

## 2020-05-30 RX ADMIN — Medication 1 TABLET(S): at 12:31

## 2020-05-30 RX ADMIN — HEPARIN SODIUM 5000 UNIT(S): 5000 INJECTION INTRAVENOUS; SUBCUTANEOUS at 12:51

## 2020-05-30 RX ADMIN — INSULIN GLARGINE 20 UNIT(S): 100 INJECTION, SOLUTION SUBCUTANEOUS at 22:01

## 2020-05-30 RX ADMIN — HEPARIN SODIUM 5000 UNIT(S): 5000 INJECTION INTRAVENOUS; SUBCUTANEOUS at 04:52

## 2020-05-30 NOTE — H&P ADULT - HISTORY OF PRESENT ILLNESS
64 year old male patient with past medical history of peptic ulcer disease secondary to H. pylori, hypertension, hyperlipidemia, benign prostatic hyperplasia, diabetes and coronary artery disease s/p CABG presented for chest pain.     In the ED : Troponin T 0.02, ECG showing PACs and no ischemic changes, CMP consistent with acute kidney injury with creatinine level of 3.1 (baseline 1.1). The patient was started on LR and was admitted for workup of his acute kidney injury and his chest pain 64 year old male patient with past medical history of peptic ulcer disease secondary to H. pylori, hypertension, hyperlipidemia, benign prostatic hyperplasia, diabetes and coronary artery disease s/p CABG presented for chest pain.     The patient states that he has started experiencing chest tightness around 2 PM yesterday, started at rest, constant, radiating to both shoulders and the neck and associated with nausea and no vomiting. The chest tightness had resolved in the ED. He said he took a syrup for it but doesn't remember the name. He also states that he has been having abdominal discomfort for a few weeks associated with intermittent diarrhea. He has noticed slow urinary flow but denies any dysuria or any oliguria or polyuria. He states that he has never experienced this chest tightness in the past.     In the ED : Troponin T 0.02, ECG showing PACs and no ischemic changes, CMP consistent with acute kidney injury with creatinine level of 3.1 (baseline 1.1). The patient was started on LR and was admitted for workup of his acute kidney injury and his chest pain

## 2020-05-30 NOTE — H&P ADULT - ASSESSMENT
64 year old male patient with past medical history of peptic ulcer disease secondary to H. pylori, hypertension, hyperlipidemia, benign prostatic hyperplasia, diabetes and coronary artery disease s/p CABG presented for chest pain.     # Chest tightness in a patient with known coronary artery disease s/p CABG 5 years ago   - Troponin T 0.02, ECG no ischemic changes, tightness resolved in ED   - Continue Aspirin 81 mg qd + statin (Pravastatin 20 mg qhs switched to Lipitor 80 mg qhs)   - Started Coreg 3.125 mg BID   - Cardiology consult Dr. Lopez, discussed with cardiac fellow since tightness had resolved in ED and no ischemic changes can monitor on Telemetry   - Trend cardiac enzymes and repeat ECG in AM, if troponin up-trending and / or new changes on ECG consider Plavix loading and starting anticoagulation   - Check echocardiogram   - Follow Hemoglobin A1C and Lipid panel   - Nitroglycerin PRN for chest pain and tightness     # Acute kidney injury, most likely pre-renal in the setting of recurrent diarrhea   - Creatinine 3.1 on admission, baseline around 1.1  - Continue IV fluids with LR @ 75 mL/h   - Check urine studies and UA   - Check abdominal US to rule out hydronephrosis   - Will start Flomax 0.4 mg qhs as documented history of BPH and patient reports weak urinary stream   - Monitor urine output     # Chronic abdominal discomfort and diarrhea   - Simethicone PRN for chest discomfort   - Patient has a history of PUD secondary to confirmed H. pylori, continue PPI   - Check stool studies and ova and parasites     # Diabetes   - Continue Lantus 20 units qhs. Metformin on hold   - Check FS before meals and at bedtime and start Lispro and sliding scale if FS > 180     # Dyslipidemia   - Lipitor 80 mg qhs     # Suspected Vitamin D deficiency   - Continue Oyster Shell Ca + Vitamin D   - Follow up Vitamin D level  - Resume Vitamin D 50 000 units weekly on discharge     # Miscellaneous   ·	DVT prophylaxis : Heparin 5000 units q8h   ·	GI prophylaxis : Protonix 40 mg qd   ·	Activity ; Ambulate as tolerated   ·	Diet : DASH TLC consistent carbohydrate   ·	Code status : Full code 64 year old male patient with past medical history of peptic ulcer disease secondary to H. pylori, hypertension, hyperlipidemia, benign prostatic hyperplasia, diabetes and coronary artery disease s/p CABG presented for chest pain.     # Chest tightness in a patient with known coronary artery disease s/p CABG 5 years ago   - Troponin T 0.02, ECG no ischemic changes, tightness resolved in ED   - Continue Aspirin 81 mg qd + statin (Pravastatin 20 mg qhs switched to Lipitor 80 mg qhs)   - Started Coreg 3.125 mg BID   - Cardiology consult Dr. Lopez, discussed with cardiac fellow since tightness had resolved in ED and no ischemic changes can monitor on Telemetry   - Trend cardiac enzymes and repeat ECG in AM, if troponin up-trending and / or new changes on ECG consider Plavix loading and starting anticoagulation   - Check echocardiogram   - Follow Hemoglobin A1C and Lipid panel   - Nitroglycerin PRN for chest pain and tightness     # Acute kidney injury, most likely pre-renal in the setting of recurrent diarrhea   - Creatinine 3.1 on admission, baseline around 1.1  - Continue IV fluids with LR @ 75 mL/h   - Check urine studies and UA   - Check abdominal US to rule out hydronephrosis   - Will start Flomax 0.4 mg qhs as documented history of BPH and patient reports weak urinary stream   - Monitor urine output     # Chronic abdominal discomfort and diarrhea   - Simethicone PRN for abdominal discomfort   - Patient has a history of PUD secondary to confirmed H. pylori, continue PPI   - Check stool studies and ova and parasites     # Diabetes   - Continue Lantus 20 units qhs. Metformin on hold   - Check FS before meals and at bedtime and start Lispro and sliding scale if FS > 180     # Dyslipidemia   - Lipitor 80 mg qhs     # Suspected Vitamin D deficiency   - Continue Oyster Shell Ca + Vitamin D   - Follow up Vitamin D level  - Resume Vitamin D 50 000 units weekly on discharge     # Miscellaneous   ·	DVT prophylaxis : Heparin 5000 units q8h   ·	GI prophylaxis : Protonix 40 mg qd   ·	Activity ; Ambulate as tolerated   ·	Diet : DASH TLC consistent carbohydrate   ·	Code status : Full code 64 year old male patient with past medical history of peptic ulcer disease secondary to H. pylori, hypertension, hyperlipidemia, benign prostatic hyperplasia, diabetes and coronary artery disease s/p CABG presented for chest pain.     # Chest tightness in a patient with known coronary artery disease s/p CABG 5 years ago   - Troponin T 0.02, ECG no ischemic changes, tightness resolved in ED   - Continue Aspirin 81 mg qd + statin (Pravastatin 20 mg qhs switched to Lipitor 80 mg qhs)   - Started Coreg 3.125 mg BID   - Cardiology consult Dr. Lopez, discussed with cardiac fellow since tightness had resolved in ED and no ischemic changes can monitor on Telemetry   - Trend cardiac enzymes and repeat ECG in AM, if troponin up-trending and / or new changes on ECG consider Plavix loading and starting anticoagulation   - Check echocardiogram   - Follow Hemoglobin A1C and Lipid panel   - Nitroglycerin PRN for chest pain and tightness     # Acute kidney injury, most likely pre-renal in the setting of recurrent diarrhea   - Creatinine 3.1 on admission, baseline around 1.1  - Continue IV fluids with LR @ 75 mL/h   - Check urine studies and UA   - Check abdominal US to rule out hydronephrosis   - Will start Flomax 0.4 mg qhs as documented history of BPH and patient reports weak urinary stream   - Monitor urine output     # Chronic abdominal discomfort and diarrhea   - Simethicone PRN for abdominal discomfort   - Patient has a history of PUD secondary to confirmed H. pylori, continue PPI   - Check stool studies and ova and parasites     # Diabetes   - Continue Lantus 20 units qhs and sliding scale. Metformin on hold   - Check FS before meals and at bedtime and start Lispro if pre-meal FS > 180     # Dyslipidemia   - Lipitor 80 mg qhs     # Suspected Vitamin D deficiency   - Continue Oyster Shell Ca + Vitamin D   - Follow up Vitamin D level  - Resume Vitamin D 50 000 units weekly on discharge     # Miscellaneous   ·	DVT prophylaxis : Heparin 5000 units q8h   ·	GI prophylaxis : Protonix 40 mg qd   ·	Activity ; Ambulate as tolerated   ·	Diet : DASH TLC consistent carbohydrate   ·	Code status : Full code

## 2020-05-30 NOTE — H&P ADULT - NSICDXPASTMEDICALHX_GEN_ALL_CORE_FT
PAST MEDICAL HISTORY:  ASHD (arteriosclerotic heart disease)     Hyperlipidemia, unspecified hyperlipidemia type     Hypertension, unspecified type     Type 2 diabetes mellitus with complication, unspecified long term insulin use status

## 2020-05-30 NOTE — H&P ADULT - ATTENDING COMMENTS
HPI:  64 year old male patient with past medical history of peptic ulcer disease secondary to H. pylori, hypertension, hyperlipidemia, benign prostatic hyperplasia, diabetes and coronary artery disease s/p CABG presented for chest pain.     The patient states that he has started experiencing chest tightness around 2 PM yesterday, started at rest, constant, radiating to both shoulders and the neck and associated with nausea and no vomiting. The chest tightness had resolved in the ED. He said he took a syrup for it but doesn't remember the name. He also states that he has been having abdominal discomfort for a few weeks associated with intermittent diarrhea. He has noticed slow urinary flow but denies any dysuria or any oliguria or polyuria. He states that he has never experienced this chest tightness in the past.     In the ED : Troponin T 0.02, ECG showing PACs and no ischemic changes, CMP consistent with acute kidney injury with creatinine level of 3.1 (baseline 1.1). The patient was started on LR and was admitted for workup of his acute kidney injury and his chest pain (30 May 2020 00:27)    REVIEW OF SYSTEMS:    CONSTITUTIONAL: No weakness, fevers or chills  EYES/ENT: No visual changes;  No vertigo or throat pain   NECK: No pain or stiffness  RESPIRATORY: No cough, wheezing, hemoptysis; No shortness of breath  CARDIOVASCULAR: No chest pain or palpitations  GASTROINTESTINAL: No abdominal or epigastric pain. No nausea, vomiting, or hematemesis; No diarrhea or constipation. No melena or hematochezia.  GENITOURINARY: No dysuria, frequency or hematuria  NEUROLOGICAL: No numbness or weakness  SKIN: No itching, rashes    Physical Exam:    General: WN/WD NAD  Neurology: A&Ox3, nonfocal, follows commands  Eyes: PERRLA/ EOMI  ENT/Neck: Neck supple, trachea midline, No JVD  Respiratory: CTA B/L, No wheezing, rales, rhonchi  CV: Normal rate regular rhythm, S1S2, no murmurs, rubs or gallops  Abdominal: Soft, NT, ND +BS,   Extremities: No edema, + peripheral pulses  Skin: No Rashes, Hematoma, Ecchymosis  Incisions: n/a  Tubes: n/a HPI:  64 year old male patient with past medical history of peptic ulcer disease secondary to H. pylori, hypertension, hyperlipidemia, benign prostatic hyperplasia, diabetes and coronary artery disease s/p CABG presented for chest pain.     The patient states that he has started experiencing chest tightness around 2 PM yesterday, started at rest, constant, radiating to both shoulders and the neck and associated with nausea and no vomiting. The chest tightness had resolved in the ED. He said he took a syrup for it but doesn't remember the name. He also states that he has been having abdominal discomfort for a few weeks associated with intermittent diarrhea. He has noticed slow urinary flow but denies any dysuria or any oliguria or polyuria. He states that he has never experienced this chest tightness in the past.     In the ED : Troponin T 0.02, ECG showing PACs and no ischemic changes, CMP consistent with acute kidney injury with creatinine level of 3.1 (baseline 1.1). The patient was started on LR and was admitted for workup of his acute kidney injury and his chest pain (30 May 2020 00:27)    REVIEW OF SYSTEMS: see cc/HPI  CONSTITUTIONAL: No weakness, fevers or chills  EYES/ENT: No visual changes;  No vertigo or throat pain   NECK: No pain or stiffness  RESPIRATORY: No cough, wheezing, hemoptysis; No shortness of breath  CARDIOVASCULAR: No chest pain or palpitations  GASTROINTESTINAL: No abdominal or epigastric pain. No nausea, vomiting, or hematemesis; No diarrhea or constipation. No melena or hematochezia.  GENITOURINARY: No dysuria, frequency or hematuria  NEUROLOGICAL: No numbness or weakness  SKIN: No itching, rashes    Physical Exam:  General: WN/WD NAD  Neurology: A&Ox3, nonfocal, follows commands  Eyes: PERRLA/ EOMI  ENT/Neck: Neck supple, trachea midline, No JVD  Respiratory: CTA B/L, No wheezing, rales, rhonchi  CV: Normal rate regular rhythm, S1S2, no murmurs, rubs or gallops  Abdominal: Soft, NT, ND +BS,   Extremities: No edema, + peripheral pulses  Skin: No Rashes, Hematoma, Ecchymosis  Incisions: n/a  Tubes: n/a  A/p  suspected ACS r/o MI / Mildly elevated Trop  -serial Nubia and EKGs   -2D echo   -fasting lipids/ A1c  -ASA/BBlocker/ Statin    AMBER ?? etiology   -check U/S renal system   -Reyna / Is and Os   -Urine lytes / Cr   -Nephrology eval   -IV fluids    Chronic abd pain - ?? etiology   - observation and outpatient workup ( no pain on exam)    DM type II / Dyslipidemia   -Lantus / Lispro PRN   -A1c and Lipid profile     Vit D def   -c/w supplementation     DVT prophylaxis

## 2020-05-30 NOTE — H&P ADULT - RS GEN PE MLT RESP DETAILS PC
respirations non-labored/no rales/breath sounds equal/good air movement/clear to auscultation bilaterally/airway patent/no rhonchi/no wheezes

## 2020-05-30 NOTE — H&P ADULT - NSICDXPASTSURGICALHX_GEN_ALL_CORE_FT
PAST SURGICAL HISTORY:  History of open heart surgery PAST SURGICAL HISTORY:  History of open heart surgery CABG x 4, 5 years ago

## 2020-05-30 NOTE — PROGRESS NOTE ADULT - SUBJECTIVE AND OBJECTIVE BOX
Pt admitted today.  No acute complaints at this time.  Dx: CP with trace trops / r/o ACS / Evidence of AMBER  c/w current care / telemetry / EKG / TTE / f/u BMP / Trend trops / CV eval     Will follow tomorrow

## 2020-05-30 NOTE — ED ADULT NURSE REASSESSMENT NOTE - NS ED NURSE REASSESS COMMENT FT1
Patient sitting on stretcher AAOx4 in no acute distress. Respirations easy and unlabored. He offers no complaints at this time. Assessment unchanged. Denies any chest pain. Continuous cardiac monitor, blood pressure, and oxygen saturation monitoring remains applied. Vital signs stable. Patient denies need for assistance, call bell in reach, will continue to monitor patient.

## 2020-05-30 NOTE — H&P ADULT - NSHPPOADEEPVENOUSTHROMB_GEN_A_CORE
Patient was discharged from [facility] requesting a visit with Dr Shields before June, is this possible   no

## 2020-05-30 NOTE — H&P ADULT - NSHPPHYSICALEXAM_GEN_ALL_CORE
ICU Vital Signs Last 24 Hrs  T(C): 36.8 (30 May 2020 00:05), Max: 37 (29 May 2020 19:50)  T(F): 98.2 (30 May 2020 00:05), Max: 98.6 (29 May 2020 19:50)  HR: 78 (30 May 2020 00:05) (60 - 78)  BP: 112/70 (30 May 2020 00:05) (89/53 - 112/70)  BP(mean): --  ABP: --  ABP(mean): --  RR: 16 (30 May 2020 00:05) (16 - 17)  SpO2: 98% (30 May 2020 00:05) (98% - 98%)

## 2020-05-30 NOTE — ED ADULT NURSE REASSESSMENT NOTE - NS ED NURSE REASSESS COMMENT FT1
Assumed care of patient at this time. Patient sitting on stretcher AAOx4 in no acute distress. Respirations easy and unlabored. He presents complaining of chest pain. He is being admitted for ACS and now awaiting telemetry admission bed assignment. Continuous cardiac monitor, blood pressure, and oxygen saturation monitoring applied. Vital signs stable. Patient denies need for assistance, call bell in reach, will continue to monitor patient.

## 2020-05-31 LAB
APPEARANCE UR: CLEAR — SIGNIFICANT CHANGE UP
BILIRUB UR-MCNC: NEGATIVE — SIGNIFICANT CHANGE UP
COLOR SPEC: COLORLESS — SIGNIFICANT CHANGE UP
CREAT ?TM UR-MCNC: 44 MG/DL — SIGNIFICANT CHANGE UP
DIFF PNL FLD: NEGATIVE — SIGNIFICANT CHANGE UP
GLUCOSE BLDC GLUCOMTR-MCNC: 120 MG/DL — HIGH (ref 70–99)
GLUCOSE BLDC GLUCOMTR-MCNC: 154 MG/DL — HIGH (ref 70–99)
GLUCOSE BLDC GLUCOMTR-MCNC: 162 MG/DL — HIGH (ref 70–99)
GLUCOSE BLDC GLUCOMTR-MCNC: 193 MG/DL — HIGH (ref 70–99)
GLUCOSE UR QL: NEGATIVE — SIGNIFICANT CHANGE UP
KETONES UR-MCNC: NEGATIVE — SIGNIFICANT CHANGE UP
LEUKOCYTE ESTERASE UR-ACNC: NEGATIVE — SIGNIFICANT CHANGE UP
NITRITE UR-MCNC: NEGATIVE — SIGNIFICANT CHANGE UP
PH UR: 7 — SIGNIFICANT CHANGE UP (ref 5–8)
PROT ?TM UR-MCNC: <5 MG/DLG/24H — SIGNIFICANT CHANGE UP
PROT ?TM UR-MCNC: <5 MG/DLG/24H — SIGNIFICANT CHANGE UP
PROT UR-MCNC: NEGATIVE — SIGNIFICANT CHANGE UP
PROT/CREAT UR-RTO: <0.1 RATIO — SIGNIFICANT CHANGE UP (ref 0–0.2)
SODIUM UR-SCNC: 50 MMOL/L — SIGNIFICANT CHANGE UP
SP GR SPEC: 1.01 — LOW (ref 1.01–1.02)
URATE UR-MCNC: 23.7 MG/DL — SIGNIFICANT CHANGE UP
UROBILINOGEN FLD QL: SIGNIFICANT CHANGE UP

## 2020-05-31 PROCEDURE — 99233 SBSQ HOSP IP/OBS HIGH 50: CPT

## 2020-05-31 PROCEDURE — 93306 TTE W/DOPPLER COMPLETE: CPT | Mod: 26

## 2020-05-31 RX ADMIN — LOSARTAN POTASSIUM 25 MILLIGRAM(S): 100 TABLET, FILM COATED ORAL at 05:53

## 2020-05-31 RX ADMIN — Medication 1: at 08:02

## 2020-05-31 RX ADMIN — HEPARIN SODIUM 5000 UNIT(S): 5000 INJECTION INTRAVENOUS; SUBCUTANEOUS at 21:46

## 2020-05-31 RX ADMIN — Medication 1: at 11:30

## 2020-05-31 RX ADMIN — HEPARIN SODIUM 5000 UNIT(S): 5000 INJECTION INTRAVENOUS; SUBCUTANEOUS at 16:55

## 2020-05-31 RX ADMIN — Medication 81 MILLIGRAM(S): at 11:30

## 2020-05-31 RX ADMIN — INSULIN GLARGINE 20 UNIT(S): 100 INJECTION, SOLUTION SUBCUTANEOUS at 21:46

## 2020-05-31 RX ADMIN — CARVEDILOL PHOSPHATE 3.12 MILLIGRAM(S): 80 CAPSULE, EXTENDED RELEASE ORAL at 17:26

## 2020-05-31 RX ADMIN — ATORVASTATIN CALCIUM 80 MILLIGRAM(S): 80 TABLET, FILM COATED ORAL at 21:46

## 2020-05-31 RX ADMIN — HEPARIN SODIUM 5000 UNIT(S): 5000 INJECTION INTRAVENOUS; SUBCUTANEOUS at 05:54

## 2020-05-31 RX ADMIN — CARVEDILOL PHOSPHATE 3.12 MILLIGRAM(S): 80 CAPSULE, EXTENDED RELEASE ORAL at 05:53

## 2020-05-31 RX ADMIN — Medication 1 TABLET(S): at 11:30

## 2020-05-31 RX ADMIN — TAMSULOSIN HYDROCHLORIDE 0.4 MILLIGRAM(S): 0.4 CAPSULE ORAL at 21:47

## 2020-05-31 RX ADMIN — PANTOPRAZOLE SODIUM 40 MILLIGRAM(S): 20 TABLET, DELAYED RELEASE ORAL at 05:53

## 2020-05-31 NOTE — CONSULT NOTE ADULT - ASSESSMENT
64 year old male patient with past medical history of peptic ulcer disease secondary to H. pylori, hypertension, hyperlipidemia, benign prostatic hyperplasia, diabetes and coronary artery disease s/p CABG presented for diarrhea and fatigue. He reports chest pain, substernal, started on the day of presentation, radiating to bilateral shoulders, present at rest, resolved in the ED. He admits that the pain felt different than the pain in 2015 when he had an MI.   According to patient he has had a normal stress echo in March 2020 with Dr. Lopez in the office. Confirmed by .     Atypical chest pain- probably GI etiology given a normal stress echo recently   Diarrhea   AMBER     Recommendations:   IVF, monitor Cr/BUN  Monitor lytes  c/w ASA, statin, BB  Resume ARB when Cr returns to baseline  No further inpatient cardiac work-up  d/c telemetry   d/w Dr Bernstein

## 2020-05-31 NOTE — CONSULT NOTE ADULT - SUBJECTIVE AND OBJECTIVE BOX
Date of Admission: 5/30/2020    CHIEF COMPLAINT: Diarrhea    HISTORY OF PRESENT ILLNESS: 64 year old male patient with past medical history of peptic ulcer disease secondary to H. pylori, hypertension, hyperlipidemia, benign prostatic hyperplasia, diabetes and coronary artery disease s/p CABG presented for chest pain.     The patient states that he has started experiencing chest tightness around 2 PM yesterday, started at rest, constant, radiating to both shoulders and the neck and associated with nausea and no vomiting. The chest tightness had resolved in the ED. He said he took a syrup for it but doesn't remember the name. He also states that he has been having abdominal discomfort for a few weeks associated with intermittent diarrhea. He has noticed slow urinary flow but denies any dysuria or any oliguria or polyuria. He states that he has never experienced this chest tightness in the past.     In the ED : Troponin T 0.02, ECG showing PACs and no ischemic changes, CMP consistent with acute kidney injury with creatinine level of 3.1 (baseline 1.1). The patient was started on LR and was admitted for workup of his acute kidney injury and his chest pain     Cardiology:   64 year old male patient with past medical history of peptic ulcer disease secondary to H. pylori, hypertension, hyperlipidemia, benign prostatic hyperplasia, diabetes and coronary artery disease s/p CABG presented for diarrhea and fatigue. He reports chest pain, substernal, started on the day of presentation, radiating to bilateral shoulders, present at rest, resolved in the ED. He admits that the pain felt different than the pain in 2015 when he had an MI. According to patient he has had a normal stress echo in March 2020 with  in the office.       PAST MEDICAL & SURGICAL HISTORY:  Type 2 diabetes mellitus with complication, unspecified long term insulin use status  Hyperlipidemia, unspecified hyperlipidemia type  Hypertension, unspecified type  ASHD (arteriosclerotic heart disease)  History of open heart surgery: CABG x 4, 5 years ago      FAMILY HISTORY:  [x] no pertinent family history of premature cardiovascular disease in first degree relatives.  Mother:   Father:   Siblings:     SOCIAL HISTORY:    [x] Non-smoker  [ ] Smoker  [ ] Alcohol    Allergies    amoxicillin (Other)  Bactroban (Other)  Percocet 10/325 (Rash)    Intolerances    	    REVIEW OF SYSTEMS:  CONSTITUTIONAL: denies fever, weight loss, or fatigue  CARDIOLOGY: see HPI  RESPIRATORY: denies shortness of breath, wheezing.   NEUROLOGICAL: denies weakness, no focal deficits to report.  ENDOCRINOLOGICAL: no recent change in diabetic medications.   GI: see HPI  PSYCHIATRY: normal mood and affect  HEENT: no nasal discharge, no ecchymosis  SKIN: no ecchymosis, no breakdown  MUSCULOSKELETAL: Full range of motion x4.     PHYSICAL EXAM:  T(C): 36.6 (05-31-20 @ 04:35), Max: 36.6 (05-31-20 @ 04:35)  HR: 71 (05-31-20 @ 04:35) (64 - 71)  BP: 129/64 (05-31-20 @ 04:35) (104/56 - 129/64)  RR: 18 (05-31-20 @ 04:35) (18 - 18)  SpO2: 98% (05-30-20 @ 16:04) (98% - 98%)  Wt(kg): --  I&O's Summary    30 May 2020 07:01  -  31 May 2020 07:00  --------------------------------------------------------  IN: 825 mL / OUT: 675 mL / NET: 150 mL    31 May 2020 07:01  -  31 May 2020 10:46  --------------------------------------------------------  IN: 330 mL / OUT: 300 mL / NET: 30 mL        General Appearance: well appearing, normal for age and gender. 	  Neck: normal JVP, no bruit.   Eyes: No xanthomalasia, Extra Ocular muscles intact.   Cardiovascular: regular rate and rhythm S1 S2, No JVD, No murmurs, No edema  Respiratory: Lungs clear to auscultation	  Psychiatry: Alert and oriented x 3, Mood & affect appropriate  Gastrointestinal:  Soft, Non-tender  Skin/Integumen: No rashes, No ecchymoses, No cyanosis	  Neurologic: Non-focal  Musculoskeletal/extremities: Normal range of motion, No clubbing, cyanosis or edema  Vascular: Peripheral pulses palpable 2+ bilaterally    LABS:	 	                          14.4   5.59  )-----------( 165      ( 30 May 2020 06:14 )             42.0     05-30    140  |  102  |  21<H>  ----------------------------<  179<H>  4.1   |  23  |  2.0<H>    Ca    9.4      30 May 2020 06:14  Mg     1.9     05-30    TPro  6.3  /  Alb  3.8  /  TBili  0.5  /  DBili  x   /  AST  17  /  ALT  18  /  AlkPhos  67  05-30    CARDIAC MARKERS ( 30 May 2020 06:14 )  x     / <0.01 ng/mL / 253 U/L / x     / 8.4 ng/mL  CARDIAC MARKERS ( 29 May 2020 20:14 )  x     / 0.02 ng/mL / x     / x     / x          PT/INR - ( 30 May 2020 06:14 )   PT: 12.70 sec;   INR: 1.10 ratio         PTT - ( 30 May 2020 06:14 )  PTT:31.1 sec    CARDIAC MARKERS:            TELEMETRY EVENTS: No events	    ECG:  	Sinus rhythm, old inferior infarct  RADIOLOGY:  < from: Xray Chest 1 View AP/PA (05.29.20 @ 21:12) >    Impression:      No radiographic evidence of acute cardiopulmonary disease.    < end of copied text >    OTHER: 	    PREVIOUS DIAGNOSTIC TESTING:    [ ] Echocardiogram:  [ ] Catheterization:  [ ] Stress Test:  	  	    Home Medications:  Admelog 100 units/mL injectable solution:  (30 May 2020 01:14)  aspirin 81 mg oral tablet: 1 tab(s) orally once a day (30 May 2020 01:14)  Basaglar KwikPen 100 units/mL subcutaneous solution: 20 unit(s) subcutaneous once a day (at bedtime) (30 May 2020 01:14)  losartan 25 mg oral tablet: 1 tab(s) orally once a day (30 May 2020 01:14)  metFORMIN 1000 mg oral tablet: 1 tab(s) orally 2 times a day (30 May 2020 01:14)  omeprazole 40 mg oral delayed release capsule: 1 cap(s) orally once a day (30 May 2020 01:14)  Oyster Shell Calcium with Vitamin D 500 mg-200 intl units (5 mcg) oral tablet: 1 tab(s) orally once a day (30 May 2020 01:14)  Ozempic (0.25 mg or 0.5 mg dose): 1 dose(s) subcutaneous once a week (30 May 2020 01:14)  pravastatin 20 mg oral tablet: 1 tab(s) orally once a day (30 May 2020 01:14)  Vitamin D3 50,000 intl units (1250 mcg) oral capsule: 1 cap(s) orally once a week (30 May 2020 01:14)    MEDICATIONS  (STANDING):  aspirin enteric coated 81 milliGRAM(s) Oral daily  atorvastatin 80 milliGRAM(s) Oral at bedtime  calcium carbonate 1250 mG  + Vitamin D (OsCal 500 + D) 1 Tablet(s) Oral daily  carvedilol 3.125 milliGRAM(s) Oral every 12 hours  heparin   Injectable 5000 Unit(s) SubCutaneous every 8 hours  insulin glargine Injectable (LANTUS) 20 Unit(s) SubCutaneous at bedtime  insulin lispro (HumaLOG) corrective regimen sliding scale   SubCutaneous three times a day before meals  lactated ringers. 1000 milliLiter(s) (75 mL/Hr) IV Continuous <Continuous>  losartan 25 milliGRAM(s) Oral daily  pantoprazole    Tablet 40 milliGRAM(s) Oral before breakfast  tamsulosin 0.4 milliGRAM(s) Oral at bedtime    MEDICATIONS  (PRN):  nitroglycerin     SubLingual 0.4 milliGRAM(s) SubLingual every 5 minutes PRN Chest Pain  simethicone 80 milliGRAM(s) Chew three times a day PRN Upset Stomach

## 2020-05-31 NOTE — PROGRESS NOTE ADULT - SUBJECTIVE AND OBJECTIVE BOX
JAVI JUDD  65y  Male    Patient is a 65y old  Male who presents with a chief complaint of Chest pain (31 May 2020 10:45)    Overnight: No new events. Feeling better denies any kind of pain     PAST MEDICAL & SURGICAL HISTORY:  Type 2 diabetes mellitus with complication, unspecified long term insulin use status  Hyperlipidemia, unspecified hyperlipidemia type  Hypertension, unspecified type  ASHD (arteriosclerotic heart disease)  History of open heart surgery: CABG x 4, 5 years ago      Physical Examination     Vitals:  T(F): 97 (20 @ 13:33), Max: 97.9 (20 @ 04:35)  HR: 59 (20 @ 13:33) (59 - 71)  BP: 118/58 (20 @ 13:33) (104/56 - 129/64)  RR: 20 (20 @ 13:33) (18 - 20)  SpO2: 98% (20 @ 16:04) (98% - 98%)    CV: NL S1, S2  Resp: CTA Bilateral   GI: +BS, Soft, NT, ND  Ext: No e/c/c   MS: AOx3     LABS:                        14.4   5.59  )-----------( 165      ( 30 May 2020 06:14 )             42.0       0530    140  |  102  |  21<H>  ----------------------------<  179<H>  4.1   |  23  |  2.0<H>    Ca    9.4      30 May 2020 06:14  Mg     1.9     30    TPro  6.3  /  Alb  3.8  /  TBili  0.5  /  DBili  x   /  AST  17  /  ALT  18  /  AlkPhos  67  05-30    PT/INR - ( 30 May 2020 06:14 )   PT: 12.70 sec;   INR: 1.10 ratio      PTT - ( 30 May 2020 06:14 )  PTT:31.1 sec    Urinalysis Basic - ( 31 May 2020 12:40 )    Color: Colorless / Appearance: Clear / S.008 / pH: x  Gluc: x / Ketone: Negative  / Bili: Negative / Urobili: <2 mg/dL   Blood: x / Protein: Negative / Nitrite: Negative   Leuk Esterase: Negative / RBC: x / WBC x   Sq Epi: x / Non Sq Epi: x / Bacteria: x    RADIOLOGY & ADDITIONAL TESTS:  SONO Abdomen: No acute dz  CXR: No acute dz     Assessment and Plan:    64M PMH: peptic ulcer disease secondary to H. pylori s/p treatment, hypertension, hyperlipidemia, benign prostatic hyperplasia, diabetes and coronary artery disease s/p CABG presented for chest pain, upper back pain and n/v/diarrhea for 1 week.     # Chest Pain / CAD s/p CABG 5 years ago   - s/p CV eval note reviewed  - Atypical CP suspected non-cardiac and no w/up needed in the hospital  - f/u with his cardiologist in clinic   - No acute EKG changes   - TTE WNL     # Acute kidney injury, most likely pre-renal in the setting of recurrent diarrhea   - Creatinine 3.1 on admission, baseline around 1.1  - Continue IV fluids with LR @ 75 mL/h for today   - UA no infection   - Check abdominal US is WNL  - Will start Flomax 0.4 mg qhs as documented history of BPH and patient reports weak urinary stream   - f/u CR tomorrow if around 1 can d/c home     # Abdominal discomfort N/V and diarrhea for a week  - Likely viral gastroenteritis  - Symptoms have all now resolved   - Patient has a history of PUD secondary to confirmed H. pylori, s/p Rx now to continue PPI       # Diabetes   - monitor FS and cover with insulin    # HLD - c/w Statin    Dispo: Anticipate / f/u Creatinine / Will need to resume ARB when renal function resolves

## 2020-06-01 ENCOUNTER — TRANSCRIPTION ENCOUNTER (OUTPATIENT)
Age: 65
End: 2020-06-01

## 2020-06-01 ENCOUNTER — OUTPATIENT (OUTPATIENT)
Dept: OUTPATIENT SERVICES | Facility: HOSPITAL | Age: 65
LOS: 1 days | End: 2020-06-01
Payer: MEDICAID

## 2020-06-01 VITALS — OXYGEN SATURATION: 96 %

## 2020-06-01 DIAGNOSIS — Z98.890 OTHER SPECIFIED POSTPROCEDURAL STATES: Chronic | ICD-10-CM

## 2020-06-01 LAB
ALBUMIN SERPL ELPH-MCNC: 4.1 G/DL — SIGNIFICANT CHANGE UP (ref 3.5–5.2)
ALP SERPL-CCNC: 74 U/L — SIGNIFICANT CHANGE UP (ref 30–115)
ALT FLD-CCNC: 20 U/L — SIGNIFICANT CHANGE UP (ref 0–41)
ANION GAP SERPL CALC-SCNC: 14 MMOL/L — SIGNIFICANT CHANGE UP (ref 7–14)
AST SERPL-CCNC: 16 U/L — SIGNIFICANT CHANGE UP (ref 0–41)
BASOPHILS # BLD AUTO: 0.01 K/UL — SIGNIFICANT CHANGE UP (ref 0–0.2)
BASOPHILS NFR BLD AUTO: 0.2 % — SIGNIFICANT CHANGE UP (ref 0–1)
BILIRUB SERPL-MCNC: 0.7 MG/DL — SIGNIFICANT CHANGE UP (ref 0.2–1.2)
BUN SERPL-MCNC: 11 MG/DL — SIGNIFICANT CHANGE UP (ref 10–20)
CALCIUM SERPL-MCNC: 9.8 MG/DL — SIGNIFICANT CHANGE UP (ref 8.5–10.1)
CHLORIDE SERPL-SCNC: 100 MMOL/L — SIGNIFICANT CHANGE UP (ref 98–110)
CO2 SERPL-SCNC: 24 MMOL/L — SIGNIFICANT CHANGE UP (ref 17–32)
CREAT SERPL-MCNC: 1.1 MG/DL — SIGNIFICANT CHANGE UP (ref 0.7–1.5)
EOSINOPHIL # BLD AUTO: 0.09 K/UL — SIGNIFICANT CHANGE UP (ref 0–0.7)
EOSINOPHIL NFR BLD AUTO: 1.5 % — SIGNIFICANT CHANGE UP (ref 0–8)
GLUCOSE BLDC GLUCOMTR-MCNC: 131 MG/DL — HIGH (ref 70–99)
GLUCOSE BLDC GLUCOMTR-MCNC: 191 MG/DL — HIGH (ref 70–99)
GLUCOSE SERPL-MCNC: 166 MG/DL — HIGH (ref 70–99)
HCT VFR BLD CALC: 48.5 % — SIGNIFICANT CHANGE UP (ref 42–52)
HGB BLD-MCNC: 16.4 G/DL — SIGNIFICANT CHANGE UP (ref 14–18)
IMM GRANULOCYTES NFR BLD AUTO: 0.2 % — SIGNIFICANT CHANGE UP (ref 0.1–0.3)
LYMPHOCYTES # BLD AUTO: 2.47 K/UL — SIGNIFICANT CHANGE UP (ref 1.2–3.4)
LYMPHOCYTES # BLD AUTO: 41.7 % — SIGNIFICANT CHANGE UP (ref 20.5–51.1)
MAGNESIUM SERPL-MCNC: 1.5 MG/DL — LOW (ref 1.8–2.4)
MCHC RBC-ENTMCNC: 30.4 PG — SIGNIFICANT CHANGE UP (ref 27–31)
MCHC RBC-ENTMCNC: 33.8 G/DL — SIGNIFICANT CHANGE UP (ref 32–37)
MCV RBC AUTO: 90 FL — SIGNIFICANT CHANGE UP (ref 80–94)
MONOCYTES # BLD AUTO: 0.41 K/UL — SIGNIFICANT CHANGE UP (ref 0.1–0.6)
MONOCYTES NFR BLD AUTO: 6.9 % — SIGNIFICANT CHANGE UP (ref 1.7–9.3)
NEUTROPHILS # BLD AUTO: 2.94 K/UL — SIGNIFICANT CHANGE UP (ref 1.4–6.5)
NEUTROPHILS NFR BLD AUTO: 49.5 % — SIGNIFICANT CHANGE UP (ref 42.2–75.2)
NRBC # BLD: 0 /100 WBCS — SIGNIFICANT CHANGE UP (ref 0–0)
PLATELET # BLD AUTO: 174 K/UL — SIGNIFICANT CHANGE UP (ref 130–400)
POTASSIUM SERPL-MCNC: 4.2 MMOL/L — SIGNIFICANT CHANGE UP (ref 3.5–5)
POTASSIUM SERPL-SCNC: 4.2 MMOL/L — SIGNIFICANT CHANGE UP (ref 3.5–5)
PROT SERPL-MCNC: 7.1 G/DL — SIGNIFICANT CHANGE UP (ref 6–8)
RBC # BLD: 5.39 M/UL — SIGNIFICANT CHANGE UP (ref 4.7–6.1)
RBC # FLD: 13.5 % — SIGNIFICANT CHANGE UP (ref 11.5–14.5)
SODIUM SERPL-SCNC: 138 MMOL/L — SIGNIFICANT CHANGE UP (ref 135–146)
WBC # BLD: 5.93 K/UL — SIGNIFICANT CHANGE UP (ref 4.8–10.8)
WBC # FLD AUTO: 5.93 K/UL — SIGNIFICANT CHANGE UP (ref 4.8–10.8)

## 2020-06-01 PROCEDURE — 99239 HOSP IP/OBS DSCHRG MGMT >30: CPT

## 2020-06-01 RX ORDER — CARVEDILOL PHOSPHATE 80 MG/1
1 CAPSULE, EXTENDED RELEASE ORAL
Qty: 60 | Refills: 0
Start: 2020-06-01 | End: 2020-06-30

## 2020-06-01 RX ORDER — MAGNESIUM SULFATE 500 MG/ML
2 VIAL (ML) INJECTION ONCE
Refills: 0 | Status: COMPLETED | OUTPATIENT
Start: 2020-06-01 | End: 2020-06-01

## 2020-06-01 RX ADMIN — Medication 50 GRAM(S): at 10:16

## 2020-06-01 RX ADMIN — LOSARTAN POTASSIUM 25 MILLIGRAM(S): 100 TABLET, FILM COATED ORAL at 05:24

## 2020-06-01 RX ADMIN — HEPARIN SODIUM 5000 UNIT(S): 5000 INJECTION INTRAVENOUS; SUBCUTANEOUS at 05:24

## 2020-06-01 RX ADMIN — Medication 1: at 11:27

## 2020-06-01 RX ADMIN — Medication 81 MILLIGRAM(S): at 11:28

## 2020-06-01 RX ADMIN — CARVEDILOL PHOSPHATE 3.12 MILLIGRAM(S): 80 CAPSULE, EXTENDED RELEASE ORAL at 05:24

## 2020-06-01 RX ADMIN — PANTOPRAZOLE SODIUM 40 MILLIGRAM(S): 20 TABLET, DELAYED RELEASE ORAL at 05:24

## 2020-06-01 RX ADMIN — Medication 1 TABLET(S): at 11:28

## 2020-06-01 NOTE — DISCHARGE NOTE PROVIDER - PROVIDER TOKENS
PROVIDER:[TOKEN:[[id number]],FOLLOWUP:[1 week],ESTABLISHEDPATIENT:[T]],PROVIDER:[TOKEN:[[id number]],FOLLOWUP:[1 week],ESTABLISHEDPATIENT:[T]]

## 2020-06-01 NOTE — PROGRESS NOTE ADULT - ASSESSMENT
atypical chest pain:   ruled out for MI   no significant EKG chnges   per cardiology Dr Mondragon patient had normal stress test in march/2020 and no need for further cardiac work up     AMBER resolved     DM : resume home meds on discharg e    Diarrhea resolved     HTN: c/w current meds     discharge today     spent 33 min on d/c

## 2020-06-01 NOTE — DISCHARGE NOTE NURSING/CASE MANAGEMENT/SOCIAL WORK - PATIENT PORTAL LINK FT
You can access the FollowMyHealth Patient Portal offered by [facility] by registering at the following website: [email or web address]. By joining [employer]’s FollowMyHealth portal, you will also be able to view your health information using other applications (apps) compatible with our system.

## 2020-06-01 NOTE — PROGRESS NOTE ADULT - SUBJECTIVE AND OBJECTIVE BOX
no chest pain   no sob   doing well   wants to go home     Vital Signs Last 24 Hrs  T(C): 35.8 (01 Jun 2020 05:28), Max: 36.1 (31 May 2020 13:33)  T(F): 96.5 (01 Jun 2020 05:28), Max: 97 (31 May 2020 13:33)  HR: 77 (01 Jun 2020 05:28) (59 - 77)  BP: 161/85 (01 Jun 2020 05:28) (118/58 - 161/85)  BP(mean): --  RR: 18 (01 Jun 2020 05:28) (18 - 20)  SpO2: 98% (01 Jun 2020 05:25) (98% - 98%)    PHYSICAL EXAM:  GENERAL: NAD, well-developed  HEAD:  Atraumatic, Normocephalic  EYES: EOMI, PERRLA, conjunctiva and sclera clear  NECK: Supple, No JVD  CHEST/LUNG: Clear to auscultation bilaterally; No wheeze  HEART: Regular rate and rhythm; No murmurs, rubs, or gallops  ABDOMEN: Soft, Nontender, Nondistended; Bowel sounds present  EXTREMITIES:  2+ Peripheral Pulses, No clubbing, cyanosis, or edema  PSYCH: AAOx3  NEUROLOGY: non-focal  SKIN: No rashes or lesions                          16.4   5.93  )-----------( 174      ( 01 Jun 2020 05:45 )             48.5     06-01    138  |  100  |  11  ----------------------------<  166<H>  4.2   |  24  |  1.1    Ca    9.8      01 Jun 2020 05:45  Mg     1.5     06-01    TPro  7.1  /  Alb  4.1  /  TBili  0.7  /  DBili  x   /  AST  16  /  ALT  20  /  AlkPhos  74  06-01    LIVER FUNCTIONS - ( 01 Jun 2020 05:45 )  Alb: 4.1 g/dL / Pro: 7.1 g/dL / ALK PHOS: 74 U/L / ALT: 20 U/L / AST: 16 U/L / GGT: x

## 2020-06-01 NOTE — DISCHARGE NOTE PROVIDER - NSDCCPCAREPLAN_GEN_ALL_CORE_FT
PRINCIPAL DISCHARGE DIAGNOSIS  Diagnosis: Atypical chest pain  Assessment and Plan of Treatment: Your chest pain was deemed non-cardiac in origin. Please follow up with your primary outpatient physician and with your cardiologist upon discharge from the hospital. If you develop recurring chest pain or pressure/tightness, shortness of breath, dizziness or lightheadedness, or any other symptoms or signs that alarm you, please seek immediate medical attention.      SECONDARY DISCHARGE DIAGNOSES  Diagnosis: Acute kidney injury  Assessment and Plan of Treatment: Please drink fluids to satiety and follow up with your outpatient primary physician. If you find you are not urinating adequately, or if you have any other symptoms or signs that alarm you, please contact your primary physician for further management.

## 2020-06-01 NOTE — DISCHARGE NOTE PROVIDER - NSDCMRMEDTOKEN_GEN_ALL_CORE_FT
Admelog 100 units/mL injectable solution:   aspirin 81 mg oral tablet: 1 tab(s) orally once a day  Basaglar KwikPen 100 units/mL subcutaneous solution: 20 unit(s) subcutaneous once a day (at bedtime)  carvedilol 3.125 mg oral tablet: 1 tab(s) orally every 12 hours  losartan 25 mg oral tablet: 1 tab(s) orally once a day  metFORMIN 1000 mg oral tablet: 1 tab(s) orally 2 times a day  omeprazole 40 mg oral delayed release capsule: 1 cap(s) orally once a day  Oyster Shell Calcium with Vitamin D 500 mg-200 intl units (5 mcg) oral tablet: 1 tab(s) orally once a day  Ozempic (0.25 mg or 0.5 mg dose): 1 dose(s) subcutaneous once a week  pravastatin 20 mg oral tablet: 1 tab(s) orally once a day  Vitamin D3 50,000 intl units (1250 mcg) oral capsule: 1 cap(s) orally once a week

## 2020-06-01 NOTE — DISCHARGE NOTE PROVIDER - CARE PROVIDER_API CALL
Darinel Lopez  Cardiovascular Disease  [address]  [place] [address]  Phone: [phone]  Fax: [phone]  Established Patient  Follow Up Time: 1 week    GRACE SOLIZ  [id number]  [address] [place] [id number]  Phone: [phone]  Fax: ([phone]  Established Patient  Follow Up Time: 1 week

## 2020-06-01 NOTE — DISCHARGE NOTE PROVIDER - HOSPITAL COURSE
64 year old male patient with past medical history of peptic ulcer disease secondary to H. pylori, hypertension, hyperlipidemia, benign prostatic hyperplasia, diabetes and coronary artery disease s/p CABG presented for chest pain. The patient states that he had started experiencing chest tightness around 2 PM the day prior to presentation, which started at rest, constant, radiating to both shoulders and the neck and associated with nausea and no vomiting. The chest tightness had resolved in the ED. He said he took a syrup for it but doesn't remember the name. He also states that he had been  having abdominal discomfort for a few weeks associated with intermittent diarrhea. He has noticed slow urinary flow but denies any dysuria or any oliguria or polyuria. He states that he had never experienced this chest tightness in the past. In the ED : Troponin T 0.02, ECG showing PACs and no ischemic changes, CMP consistent with acute kidney injury with creatinine level of 3.1 (baseline 1.1). The patient was started on LR and was admitted for workup of his acute kidney injury and his chest pain. His repeat troponin was 0.01. By 6/1 his chest pain had resolved and deemed to have been non-cardiac in etiology. He was stable for discharge to home to follow up with Dr. Lopez.

## 2020-06-03 DIAGNOSIS — Z95.1 PRESENCE OF AORTOCORONARY BYPASS GRAFT: ICD-10-CM

## 2020-06-03 DIAGNOSIS — R07.89 OTHER CHEST PAIN: ICD-10-CM

## 2020-06-03 DIAGNOSIS — Z71.89 OTHER SPECIFIED COUNSELING: ICD-10-CM

## 2020-06-03 DIAGNOSIS — E11.9 TYPE 2 DIABETES MELLITUS WITHOUT COMPLICATIONS: ICD-10-CM

## 2020-06-03 DIAGNOSIS — Z79.84 LONG TERM (CURRENT) USE OF ORAL HYPOGLYCEMIC DRUGS: ICD-10-CM

## 2020-06-03 DIAGNOSIS — N40.0 BENIGN PROSTATIC HYPERPLASIA WITHOUT LOWER URINARY TRACT SYMPTOMS: ICD-10-CM

## 2020-06-03 DIAGNOSIS — E55.9 VITAMIN D DEFICIENCY, UNSPECIFIED: ICD-10-CM

## 2020-06-03 DIAGNOSIS — E78.5 HYPERLIPIDEMIA, UNSPECIFIED: ICD-10-CM

## 2020-06-03 DIAGNOSIS — N17.9 ACUTE KIDNEY FAILURE, UNSPECIFIED: ICD-10-CM

## 2020-06-03 DIAGNOSIS — I25.10 ATHEROSCLEROTIC HEART DISEASE OF NATIVE CORONARY ARTERY WITHOUT ANGINA PECTORIS: ICD-10-CM

## 2020-06-03 DIAGNOSIS — I25.2 OLD MYOCARDIAL INFARCTION: ICD-10-CM

## 2020-06-03 DIAGNOSIS — K27.3 ACUTE PEPTIC ULCER, SITE UNSPECIFIED, WITHOUT HEMORRHAGE OR PERFORATION: ICD-10-CM

## 2020-06-03 DIAGNOSIS — R79.89 OTHER SPECIFIED ABNORMAL FINDINGS OF BLOOD CHEMISTRY: ICD-10-CM

## 2020-06-03 DIAGNOSIS — I95.9 HYPOTENSION, UNSPECIFIED: ICD-10-CM

## 2020-06-03 DIAGNOSIS — R07.9 CHEST PAIN, UNSPECIFIED: ICD-10-CM

## 2020-06-15 PROCEDURE — G9001: CPT

## 2020-07-06 ENCOUNTER — EMERGENCY (EMERGENCY)
Facility: HOSPITAL | Age: 65
LOS: 0 days | Discharge: HOME | End: 2020-07-07
Attending: EMERGENCY MEDICINE | Admitting: EMERGENCY MEDICINE
Payer: MEDICAID

## 2020-07-06 VITALS
SYSTOLIC BLOOD PRESSURE: 148 MMHG | HEART RATE: 90 BPM | OXYGEN SATURATION: 98 % | TEMPERATURE: 98 F | RESPIRATION RATE: 20 BRPM | DIASTOLIC BLOOD PRESSURE: 83 MMHG

## 2020-07-06 VITALS
DIASTOLIC BLOOD PRESSURE: 70 MMHG | OXYGEN SATURATION: 98 % | HEIGHT: 69 IN | RESPIRATION RATE: 20 BRPM | WEIGHT: 179.9 LBS | TEMPERATURE: 99 F | SYSTOLIC BLOOD PRESSURE: 160 MMHG | HEART RATE: 88 BPM

## 2020-07-06 DIAGNOSIS — E78.5 HYPERLIPIDEMIA, UNSPECIFIED: ICD-10-CM

## 2020-07-06 DIAGNOSIS — Z88.1 ALLERGY STATUS TO OTHER ANTIBIOTIC AGENTS STATUS: ICD-10-CM

## 2020-07-06 DIAGNOSIS — R10.9 UNSPECIFIED ABDOMINAL PAIN: ICD-10-CM

## 2020-07-06 DIAGNOSIS — Z98.890 OTHER SPECIFIED POSTPROCEDURAL STATES: ICD-10-CM

## 2020-07-06 DIAGNOSIS — I10 ESSENTIAL (PRIMARY) HYPERTENSION: ICD-10-CM

## 2020-07-06 DIAGNOSIS — I25.10 ATHEROSCLEROTIC HEART DISEASE OF NATIVE CORONARY ARTERY WITHOUT ANGINA PECTORIS: ICD-10-CM

## 2020-07-06 DIAGNOSIS — Z98.890 OTHER SPECIFIED POSTPROCEDURAL STATES: Chronic | ICD-10-CM

## 2020-07-06 DIAGNOSIS — Z88.8 ALLERGY STATUS TO OTHER DRUGS, MEDICAMENTS AND BIOLOGICAL SUBSTANCES: ICD-10-CM

## 2020-07-06 DIAGNOSIS — E11.9 TYPE 2 DIABETES MELLITUS WITHOUT COMPLICATIONS: ICD-10-CM

## 2020-07-06 DIAGNOSIS — R10.32 LEFT LOWER QUADRANT PAIN: ICD-10-CM

## 2020-07-06 LAB
ALBUMIN SERPL ELPH-MCNC: 4.3 G/DL — SIGNIFICANT CHANGE UP (ref 3.5–5.2)
ALP SERPL-CCNC: 63 U/L — SIGNIFICANT CHANGE UP (ref 30–115)
ALT FLD-CCNC: 20 U/L — SIGNIFICANT CHANGE UP (ref 0–41)
ANION GAP SERPL CALC-SCNC: 11 MMOL/L — SIGNIFICANT CHANGE UP (ref 7–14)
AST SERPL-CCNC: 17 U/L — SIGNIFICANT CHANGE UP (ref 0–41)
BASOPHILS # BLD AUTO: 0.01 K/UL — SIGNIFICANT CHANGE UP (ref 0–0.2)
BASOPHILS NFR BLD AUTO: 0.1 % — SIGNIFICANT CHANGE UP (ref 0–1)
BILIRUB SERPL-MCNC: 0.4 MG/DL — SIGNIFICANT CHANGE UP (ref 0.2–1.2)
BUN SERPL-MCNC: 19 MG/DL — SIGNIFICANT CHANGE UP (ref 10–20)
CALCIUM SERPL-MCNC: 9.4 MG/DL — SIGNIFICANT CHANGE UP (ref 8.5–10.1)
CHLORIDE SERPL-SCNC: 103 MMOL/L — SIGNIFICANT CHANGE UP (ref 98–110)
CO2 SERPL-SCNC: 25 MMOL/L — SIGNIFICANT CHANGE UP (ref 17–32)
CREAT SERPL-MCNC: 1.3 MG/DL — SIGNIFICANT CHANGE UP (ref 0.7–1.5)
EOSINOPHIL # BLD AUTO: 0.06 K/UL — SIGNIFICANT CHANGE UP (ref 0–0.7)
EOSINOPHIL NFR BLD AUTO: 0.9 % — SIGNIFICANT CHANGE UP (ref 0–8)
GLUCOSE SERPL-MCNC: 127 MG/DL — HIGH (ref 70–99)
HCT VFR BLD CALC: 44.5 % — SIGNIFICANT CHANGE UP (ref 42–52)
HGB BLD-MCNC: 15 G/DL — SIGNIFICANT CHANGE UP (ref 14–18)
IMM GRANULOCYTES NFR BLD AUTO: 0.6 % — HIGH (ref 0.1–0.3)
LACTATE SERPL-SCNC: 2.3 MMOL/L — HIGH (ref 0.7–2)
LIDOCAIN IGE QN: 149 U/L — HIGH (ref 7–60)
LYMPHOCYTES # BLD AUTO: 1.81 K/UL — SIGNIFICANT CHANGE UP (ref 1.2–3.4)
LYMPHOCYTES # BLD AUTO: 27.1 % — SIGNIFICANT CHANGE UP (ref 20.5–51.1)
MCHC RBC-ENTMCNC: 30.9 PG — SIGNIFICANT CHANGE UP (ref 27–31)
MCHC RBC-ENTMCNC: 33.7 G/DL — SIGNIFICANT CHANGE UP (ref 32–37)
MCV RBC AUTO: 91.8 FL — SIGNIFICANT CHANGE UP (ref 80–94)
MONOCYTES # BLD AUTO: 0.55 K/UL — SIGNIFICANT CHANGE UP (ref 0.1–0.6)
MONOCYTES NFR BLD AUTO: 8.2 % — SIGNIFICANT CHANGE UP (ref 1.7–9.3)
NEUTROPHILS # BLD AUTO: 4.21 K/UL — SIGNIFICANT CHANGE UP (ref 1.4–6.5)
NEUTROPHILS NFR BLD AUTO: 63.1 % — SIGNIFICANT CHANGE UP (ref 42.2–75.2)
NRBC # BLD: 0 /100 WBCS — SIGNIFICANT CHANGE UP (ref 0–0)
PLATELET # BLD AUTO: 186 K/UL — SIGNIFICANT CHANGE UP (ref 130–400)
POTASSIUM SERPL-MCNC: 4.1 MMOL/L — SIGNIFICANT CHANGE UP (ref 3.5–5)
POTASSIUM SERPL-SCNC: 4.1 MMOL/L — SIGNIFICANT CHANGE UP (ref 3.5–5)
PROT SERPL-MCNC: 6.7 G/DL — SIGNIFICANT CHANGE UP (ref 6–8)
RBC # BLD: 4.85 M/UL — SIGNIFICANT CHANGE UP (ref 4.7–6.1)
RBC # FLD: 13.2 % — SIGNIFICANT CHANGE UP (ref 11.5–14.5)
SODIUM SERPL-SCNC: 139 MMOL/L — SIGNIFICANT CHANGE UP (ref 135–146)
WBC # BLD: 6.68 K/UL — SIGNIFICANT CHANGE UP (ref 4.8–10.8)
WBC # FLD AUTO: 6.68 K/UL — SIGNIFICANT CHANGE UP (ref 4.8–10.8)

## 2020-07-06 PROCEDURE — 99285 EMERGENCY DEPT VISIT HI MDM: CPT

## 2020-07-06 PROCEDURE — 93010 ELECTROCARDIOGRAM REPORT: CPT

## 2020-07-06 RX ORDER — SODIUM CHLORIDE 9 MG/ML
1000 INJECTION, SOLUTION INTRAVENOUS ONCE
Refills: 0 | Status: COMPLETED | OUTPATIENT
Start: 2020-07-06 | End: 2020-07-06

## 2020-07-06 RX ORDER — ONDANSETRON 8 MG/1
4 TABLET, FILM COATED ORAL ONCE
Refills: 0 | Status: COMPLETED | OUTPATIENT
Start: 2020-07-06 | End: 2020-07-06

## 2020-07-06 RX ORDER — KETOROLAC TROMETHAMINE 30 MG/ML
15 SYRINGE (ML) INJECTION ONCE
Refills: 0 | Status: DISCONTINUED | OUTPATIENT
Start: 2020-07-06 | End: 2020-07-06

## 2020-07-06 RX ADMIN — Medication 15 MILLIGRAM(S): at 22:26

## 2020-07-06 RX ADMIN — ONDANSETRON 4 MILLIGRAM(S): 8 TABLET, FILM COATED ORAL at 22:20

## 2020-07-06 RX ADMIN — SODIUM CHLORIDE 1000 MILLILITER(S): 9 INJECTION, SOLUTION INTRAVENOUS at 22:26

## 2020-07-06 NOTE — ED ADULT NURSE NOTE - CHPI ED NUR SYMPTOMS NEG
no burning urination/no dysuria/no blood in stool/no diarrhea/no chills/no hematuria/no vomiting/no fever

## 2020-07-06 NOTE — ED ADULT NURSE NOTE - OBJECTIVE STATEMENT
Pt BIBA c/o LLQ abdomen x1 week with nausea and "slow urine". Pt states pain is worse when he stands.  Pt denies fever/chills/vomiting/diarrhea.

## 2020-07-07 PROCEDURE — 74177 CT ABD & PELVIS W/CONTRAST: CPT | Mod: 26

## 2020-07-07 RX ADMIN — SODIUM CHLORIDE 1000 MILLILITER(S): 9 INJECTION, SOLUTION INTRAVENOUS at 00:00

## 2020-07-07 RX ADMIN — Medication 20 MILLIGRAM(S): at 00:08

## 2020-07-07 NOTE — ED PROVIDER NOTE - NSFOLLOWUPINSTRUCTIONS_ED_ALL_ED_FT
Abdominal Pain    Many things can cause abdominal pain. Usually, abdominal pain is not caused by a disease and will improve without treatment. Your health care provider will do a physical exam and possibly order blood tests and imaging to help determine the seriousness of your pain. However, in many cases, no cause may be found and you may need further testing as an outpatient. Monitor your abdominal pain for any changes.     SEEK IMMEDIATE MEDICAL CARE IF YOU HAVE THE FOLLOWING SYMPTOMS: worsening abdominal pain, vomiting, diarrhea, inability to have bowel movements or pass gas, black or bloody stool, fever accompanying chest pain or back pain, or dizziness/lightheadedness.    Diarrhea    Diarrhea is frequent loose and watery bowel movements that has many causes. Diarrhea can make you feel weak and cause you to become dehydrated. Diarrhea typically lasts 3-5 days. However, it can last longer if it is a sign of something more serious. Drink clear fluids to prevent dehydration. Eat bland, easy-to-digest foods as tolerated.     SEEK IMMEDIATE MEDICAL CARE IF YOU HAVE THE FOLLOWING SYMPTOMS: fever, lightheadedness/dizziness, chest pain, black or bloody stools, shortness of breath, severe abdominal or back pain, or any signs of dehydration.

## 2020-07-07 NOTE — ED PROVIDER NOTE - PROVIDER TOKENS
FREE:[LAST:[PMD],PHONE:[(   )    -],FAX:[(   )    -],ADDRESS:[your PMD],FOLLOWUP:[1-3 Days],ESTABLISHEDPATIENT:[T]],PROVIDER:[TOKEN:[[id number]],FOLLOWUP:[1-3 Days],ESTABLISHEDPATIENT:[T]]

## 2020-07-07 NOTE — ED PROVIDER NOTE - OBJECTIVE STATEMENT
65M h/o cad/cabg, dm, htn, hl, gerd on prilosec, psx open appendectomy >15y ago p/w abd pain x 1 week. Intermitt sharp LLQ pain non-radiating, no specific aggrav/allev factors, accomp by loose stools & nausea. No f/c, cp/sob, vomiting, melena, brbpr, flank pain, urinary sx, testicular pain/swelling, rash. No sick contacts, recent travel or abx. GI Dr. Zhong, colonoscopy 3y ago EGD 1y ago both nl per pt. Called Dr. Zhong when sx began, was given Rx for op stool studies and CT, came to ED tonight for worsening pain. PMD Tania (Matthew).

## 2020-07-07 NOTE — ED PROVIDER NOTE - PATIENT PORTAL LINK FT
You can access the FollowMyHealth Patient Portal offered by [facility] by registering at the following website: [email or web address]. By joining [employer]’s FollowMyHealth portal, you will also be able to view your health information using other applications (apps) compatible with our system.

## 2020-07-07 NOTE — ED PROVIDER NOTE - PHYSICAL EXAMINATION
VITAL SIGNS: I have reviewed nursing notes and confirm.  CONSTITUTIONAL: Well-developed; well-nourished; in no acute distress.  SKIN: Skin exam is warm and dry, no acute rash.  HEAD: Normocephalic; atraumatic.  EYES: PERRL, EOM intact; conjunctiva and sclera clear.  ENT: No nasal discharge; airway clear.  NECK: Supple; non tender.  CARD: S1, S2 normal; no murmurs, gallops, or rubs. Regular rate and rhythm.  RESP: No wheezes, rales or rhonchi.  ABD: Normal bowel sounds; soft; non-distended; mild LLQ ttp rest non-tender; no hepatosplenomegaly; no rgr.  : nl exam, no testicular ttp/erythema/edema, no penile discharge, no palpable inguinal hernias, no rash  BACK: non-tender, no CVAT  EXT: Normal ROM. No clubbing, cyanosis or edema. DPI.  NEURO: Alert, oriented. Grossly unremarkable. No focal deficits.  PSYCH: Cooperative, appropriate.

## 2020-07-07 NOTE — ED PROVIDER NOTE - CARE PROVIDER_API CALL
PMD,   your PMD  Phone: (   )    -  Fax: (   )    -  Established Patient  Follow Up Time: 1-3 Days    Raffy Zhong  Gastroenterology  [address] [place] [address]  Phone: [phone]  Fax: [phone]  Established Patient  Follow Up Time: 1-3 Days

## 2020-07-07 NOTE — ED PROVIDER NOTE - NS ED ROS FT
Constitutional: No fever, chills, unintended weight loss.  Eyes:  No visual changes, eye pain or discharge.  ENMT:  No hearing changes, pain, no sore throat or runny nose, no difficulty swallowing  Cardiac:  No chest pain, SOB or edema. No chest pain with exertion.  Respiratory:  No cough or respiratory distress. No hemoptysis. No history of asthma or RAD.  GI:  see hpi  :  No dysuria, frequency or burning.  MS:  No myalgia, muscle weakness, joint pain or back pain.  Neuro:  No headache or weakness.  No LOC.  Skin:  No skin rash.   Endocrine: No history of thyroid disease +diabetes.

## 2020-07-07 NOTE — ED PROVIDER NOTE - CLINICAL SUMMARY MEDICAL DECISION MAKING FREE TEXT BOX
LLQ pain, diarrhea - labs/CT AP nl - all results d/w pt & copies given, strict return precautions discussed, Rx bentyl rec continued outpt f/u with GI Dr. Zhong, incl completion of stool studies as ordered

## 2020-08-17 ENCOUNTER — EMERGENCY (EMERGENCY)
Facility: HOSPITAL | Age: 65
LOS: 0 days | Discharge: HOME | End: 2020-08-18
Attending: STUDENT IN AN ORGANIZED HEALTH CARE EDUCATION/TRAINING PROGRAM | Admitting: STUDENT IN AN ORGANIZED HEALTH CARE EDUCATION/TRAINING PROGRAM
Payer: MEDICAID

## 2020-08-17 VITALS
HEART RATE: 72 BPM | DIASTOLIC BLOOD PRESSURE: 70 MMHG | RESPIRATION RATE: 20 BRPM | OXYGEN SATURATION: 100 % | SYSTOLIC BLOOD PRESSURE: 131 MMHG | WEIGHT: 190.04 LBS | TEMPERATURE: 98 F

## 2020-08-17 DIAGNOSIS — Z88.8 ALLERGY STATUS TO OTHER DRUGS, MEDICAMENTS AND BIOLOGICAL SUBSTANCES STATUS: ICD-10-CM

## 2020-08-17 DIAGNOSIS — E11.9 TYPE 2 DIABETES MELLITUS WITHOUT COMPLICATIONS: ICD-10-CM

## 2020-08-17 DIAGNOSIS — E78.5 HYPERLIPIDEMIA, UNSPECIFIED: ICD-10-CM

## 2020-08-17 DIAGNOSIS — R10.9 UNSPECIFIED ABDOMINAL PAIN: ICD-10-CM

## 2020-08-17 DIAGNOSIS — K40.90 UNILATERAL INGUINAL HERNIA, WITHOUT OBSTRUCTION OR GANGRENE, NOT SPECIFIED AS RECURRENT: ICD-10-CM

## 2020-08-17 DIAGNOSIS — Z98.890 OTHER SPECIFIED POSTPROCEDURAL STATES: Chronic | ICD-10-CM

## 2020-08-17 DIAGNOSIS — I10 ESSENTIAL (PRIMARY) HYPERTENSION: ICD-10-CM

## 2020-08-17 DIAGNOSIS — Z88.1 ALLERGY STATUS TO OTHER ANTIBIOTIC AGENTS STATUS: ICD-10-CM

## 2020-08-17 LAB
ALBUMIN SERPL ELPH-MCNC: 4.1 G/DL — SIGNIFICANT CHANGE UP (ref 3.5–5.2)
ALP SERPL-CCNC: 71 U/L — SIGNIFICANT CHANGE UP (ref 30–115)
ALT FLD-CCNC: 26 U/L — SIGNIFICANT CHANGE UP (ref 0–41)
ANION GAP SERPL CALC-SCNC: 11 MMOL/L — SIGNIFICANT CHANGE UP (ref 7–14)
AST SERPL-CCNC: 19 U/L — SIGNIFICANT CHANGE UP (ref 0–41)
BASOPHILS # BLD AUTO: 0.01 K/UL — SIGNIFICANT CHANGE UP (ref 0–0.2)
BASOPHILS NFR BLD AUTO: 0.2 % — SIGNIFICANT CHANGE UP (ref 0–1)
BILIRUB SERPL-MCNC: 0.3 MG/DL — SIGNIFICANT CHANGE UP (ref 0.2–1.2)
BUN SERPL-MCNC: 21 MG/DL — HIGH (ref 10–20)
CALCIUM SERPL-MCNC: 9.7 MG/DL — SIGNIFICANT CHANGE UP (ref 8.5–10.1)
CHLORIDE SERPL-SCNC: 103 MMOL/L — SIGNIFICANT CHANGE UP (ref 98–110)
CO2 SERPL-SCNC: 25 MMOL/L — SIGNIFICANT CHANGE UP (ref 17–32)
CREAT SERPL-MCNC: 1 MG/DL — SIGNIFICANT CHANGE UP (ref 0.7–1.5)
EOSINOPHIL # BLD AUTO: 0.06 K/UL — SIGNIFICANT CHANGE UP (ref 0–0.7)
EOSINOPHIL NFR BLD AUTO: 1 % — SIGNIFICANT CHANGE UP (ref 0–8)
GLUCOSE SERPL-MCNC: 170 MG/DL — HIGH (ref 70–99)
HCT VFR BLD CALC: 42.9 % — SIGNIFICANT CHANGE UP (ref 42–52)
HGB BLD-MCNC: 14.4 G/DL — SIGNIFICANT CHANGE UP (ref 14–18)
IMM GRANULOCYTES NFR BLD AUTO: 0.3 % — SIGNIFICANT CHANGE UP (ref 0.1–0.3)
LYMPHOCYTES # BLD AUTO: 2.28 K/UL — SIGNIFICANT CHANGE UP (ref 1.2–3.4)
LYMPHOCYTES # BLD AUTO: 36.1 % — SIGNIFICANT CHANGE UP (ref 20.5–51.1)
MCHC RBC-ENTMCNC: 30.3 PG — SIGNIFICANT CHANGE UP (ref 27–31)
MCHC RBC-ENTMCNC: 33.6 G/DL — SIGNIFICANT CHANGE UP (ref 32–37)
MCV RBC AUTO: 90.3 FL — SIGNIFICANT CHANGE UP (ref 80–94)
MONOCYTES # BLD AUTO: 0.44 K/UL — SIGNIFICANT CHANGE UP (ref 0.1–0.6)
MONOCYTES NFR BLD AUTO: 7 % — SIGNIFICANT CHANGE UP (ref 1.7–9.3)
NEUTROPHILS # BLD AUTO: 3.5 K/UL — SIGNIFICANT CHANGE UP (ref 1.4–6.5)
NEUTROPHILS NFR BLD AUTO: 55.4 % — SIGNIFICANT CHANGE UP (ref 42.2–75.2)
NRBC # BLD: 0 /100 WBCS — SIGNIFICANT CHANGE UP (ref 0–0)
PLATELET # BLD AUTO: 162 K/UL — SIGNIFICANT CHANGE UP (ref 130–400)
POTASSIUM SERPL-MCNC: 4.3 MMOL/L — SIGNIFICANT CHANGE UP (ref 3.5–5)
POTASSIUM SERPL-SCNC: 4.3 MMOL/L — SIGNIFICANT CHANGE UP (ref 3.5–5)
PROT SERPL-MCNC: 6.7 G/DL — SIGNIFICANT CHANGE UP (ref 6–8)
RBC # BLD: 4.75 M/UL — SIGNIFICANT CHANGE UP (ref 4.7–6.1)
RBC # FLD: 12.8 % — SIGNIFICANT CHANGE UP (ref 11.5–14.5)
SODIUM SERPL-SCNC: 139 MMOL/L — SIGNIFICANT CHANGE UP (ref 135–146)
WBC # BLD: 6.31 K/UL — SIGNIFICANT CHANGE UP (ref 4.8–10.8)
WBC # FLD AUTO: 6.31 K/UL — SIGNIFICANT CHANGE UP (ref 4.8–10.8)

## 2020-08-17 PROCEDURE — 99285 EMERGENCY DEPT VISIT HI MDM: CPT

## 2020-08-17 NOTE — ED PROVIDER NOTE - CARE PROVIDER_API CALL
Dm Madrid  SURGERY  [address] [place] [id number]  Phone: [phone]  Fax: [phone]  Follow Up Time:

## 2020-08-17 NOTE — ED PROVIDER NOTE - NSFOLLOWUPINSTRUCTIONS_ED_ALL_ED_FT
Hernia, Adult    A hernia is the bulging of an organ or tissue through a weak spot in the muscles of the abdomen (abdominal wall). Hernias develop most often near the navel or groin.    There are many kinds of hernias. Common kinds include:    Femoral hernia. This kind of hernia develops under the groin in the upper thigh area.  Inguinal hernia. This kind of hernia develops in the groin or scrotum.  Umbilical hernia. This kind of hernia develops near the navel.  Hiatal hernia. This kind of hernia causes part of the stomach to be pushed up into the chest.  Incisional hernia. This kind of hernia bulges through a scar from an abdominal surgery.     CAUSES  This condition may be caused by:    Heavy lifting.  Coughing over a long period of time.  Straining to have a bowel movement.  An incision made during an abdominal surgery.   A birth defect (congenital defect).  Excess weight or obesity.  Smoking.  Poor nutrition.  Cystic fibrosis.  Excess fluid in the abdomen.  Undescended testicles.    SYMPTOMS  Symptoms of a hernia include:    A lump on the abdomen. This is the first sign of a hernia. The lump may become more obvious with standing, straining, or coughing. It may get bigger over time if it is not treated or if the condition causing it is not treated.  Pain. A hernia is usually painless, but it may become painful over time if treatment is delayed. The pain is usually dull and may get worse with standing or lifting heavy objects.    Sometimes a hernia gets tightly squeezed in the weak spot (strangulated) or stuck there (incarcerated) and causes additional symptoms. These symptoms may include:    Vomiting.  Nausea.  Constipation.  Irritability.    DIAGNOSIS  A hernia may be diagnosed with:    A physical exam. During the exam your health care provider may ask you to cough or to make a specific movement, because a hernia is usually more visible when you move.  Imaging tests. These can include:  X-rays.  Ultrasound.  CT scan.    TREATMENT  A hernia that is small and painless may not need to be treated. A hernia that is large or painful may be treated with surgery. Inguinal hernias may be treated with surgery to prevent incarceration or strangulation. Strangulated hernias are always treated with surgery, because lack of blood to the trapped organ or tissue can cause it to die.    Surgery to treat a hernia involves pushing the bulge back into place and repairing the weak part of the abdomen.    HOME CARE INSTRUCTIONS  Avoid straining.  Do not lift anything heavier than 10 lb (4.5 kg).  Lift with your leg muscles, not your back muscles. This helps avoid strain.  When coughing, try to cough gently.  Prevent constipation. Constipation leads to straining with bowel movements, which can make a hernia worse or cause a hernia repair to break down. You can prevent constipation by:  Eating a high-fiber diet that includes plenty of fruits and vegetables.  Drinking enough fluids to keep your urine clear or pale yellow. Aim to drink 6–8 glasses of water per day.  Using a stool softener as directed by your health care provider.  Lose weight, if you are overweight.  Do not use any tobacco products, including cigarettes, chewing tobacco, or electronic cigarettes. If you need help quitting, ask your health care provider.  Keep all follow-up visits as directed by your health care provider. This is important. Your health care provider may need to monitor your condition.    SEEK MEDICAL CARE IF:  You have swelling, redness, and pain in the affected area.  Your bowel habits change.    SEEK IMMEDIATE MEDICAL CARE IF:  You have a fever.  You have abdominal pain that is getting worse.  You feel nauseous or you vomit.  You cannot push the hernia back in place by gently pressing on it while you are lying down.  The hernia:  Changes in shape or size.  Is stuck outside the abdomen.  Becomes discolored.  Feels hard or tender.    ADDITIONAL NOTES AND INSTRUCTIONS    Please follow up with your Primary MD in 24-48 hr.  Seek immediate medical care for any new/worsening signs or symptoms.

## 2020-08-17 NOTE — ED PROVIDER NOTE - ATTENDING CONTRIBUTION TO CARE
66 yo m hx htn, hld, dm here for left pelvic pain. pain present for months. pt previously seen in ED for same pain and had labs and ctap. of note ctap showed:     < from: CT Abdomen and Pelvis w/ IV Cont (07.07.20 @ 00:31) >  OTHER: Fat-containing umbilical hernia. Vascular calcifications. Left fat-containing inguinal hernia.  IMPRESSION:  No evidence of acute intra-abdominal pathology.  < end of copied text >    pt has since seen GI and was cleared and ref to uro. pt states pain has been on and off over the past 3 months. pain more persistent today so he came in. pt eating/drinking well today. pt made normal BM today. pain better w/ standing/moving. pt states sometimes there appears to be more of a bulge.     vss  gen- NAD, aaox3  card-rrr  lungs-ctab, no wheezing or rhonchi  abd-sntnd, no guarding or rebound, mild L inguinal bulge compared to right, no overlying skin changes, no ttp  : normal external exam, no testicular tenderness, no hernia appreciated in scrotum  neuro- full str/sensation, cn ii-xii grossly intact, normal coordination and gait    pain likely 2/2 fat containing L inguinal hernia, no e/o strangulation/incarceration. no severe pain. will get  sono and ua to complete w/u, surg f/u if neg

## 2020-08-17 NOTE — ED PROVIDER NOTE - OBJECTIVE STATEMENT
65 year old male with pmhx of htn, hld, and dm, presents with left lower abdominal/groin pain. Pt has been having pain since June, however, has not gone away. pt had CT scan of abdomen done in ED which showed left fat-containing inguinal hernia. Pt seen by GI, told to follow up with urologist. denies nausea, vomiting, diarrhea, fever, chills, urinary symptoms, testicular pain or swelling, or penile discharge.

## 2020-08-17 NOTE — ED PROVIDER NOTE - PHYSICAL EXAMINATION
CONST: Well appearing in NAD  EYES: PERRL, EOMI, Sclera and conjunctiva clear.   ENT: No nasal discharge. TM's clear B/L without drainage. Oropharynx normal appearing, no erythema or exudates. No abscess or swelling. Uvula midline  NECK: Non-tender, no meningeal signs. normal ROM. supple   CARD: Normal S1 S2; Normal rate and rhythm  RESP: Equal BS B/L, No wheezes, rhonchi or rales. No distress  GI: Soft, non-tender, non-distended. no cva tenderness. normal BS  MS: Normal ROM in all extremities. No midline spinal tenderness. pulses 2 +. no calf tenderness or swelling  SKIN: Warm, dry, no acute rashes. Good turgor

## 2020-08-17 NOTE — ED PROVIDER NOTE - NSFOLLOWUPCLINICS_GEN_ALL_ED_FT
[facility]  Surgery  [address] [place] [address]  Phone: [phone]  Fax:   Follow Up Time:

## 2020-08-17 NOTE — ED PROVIDER NOTE - PATIENT PORTAL LINK FT
You can access the FollowMyHealth Patient Portal offered by [facility] by registering at the following website: [email or web address]. By joining "[employer]’s FollowMyHealth portal, you will also be able to view your health information using other applications (apps) compatible with our system.

## 2020-08-17 NOTE — ED PROVIDER NOTE - CLINICAL SUMMARY MEDICAL DECISION MAKING FREE TEXT BOX
ua negative, us negative. pain likely from hernia. will ref to surg. no e/o obstruction/strangulation

## 2020-08-17 NOTE — ED PROVIDER NOTE - NS ED ROS FT
Review of Systems:  	•	CONSTITUTIONAL - no fever, no diaphoresis, no chills  	•	SKIN - no rash  	•	HEMATOLOGIC - no bleeding, no bruising  	•	EYES - no eye pain, no blurry vision  	•	ENT - no change in hearing, no sore throat, no ear pain or tinnitus  	•	RESPIRATORY - no shortness of breath, no cough  	•	CARDIAC - no chest pain, no palpitations  	•	GI - + abd pain, no nausea, no vomiting, no diarrhea, no constipation  	•	GENITO-URINARY - no discharge, no dysuria; no hematuria, no increased urinary frequency  	•	MUSCULOSKELETAL - no joint paint, no swelling, no redness  	•	NEUROLOGIC - no weakness, no headache, no paresthesias, no LOC  	•	PSYCH - no anxiety, non suicidal, non homicidal, no hallucination, no depression

## 2020-08-17 NOTE — ED ADULT TRIAGE NOTE - ESI TRIAGE ACUITY LEVEL, MLM
DATE:  04/21/2019



Covering for Dr. Hdz.



Case was discussed with staff of the patient, reviewed records.  The patient

continues to be confused, continues to be unpredictable, impulsive, demented,

paranoid, continues to be unable to take care of herself or make safe plan for

self-care.  Continues to be easily agitated, angry.  She has been compliant with

the medication with no side effects, no sedation, no nausea, no extrapyramidal

symptoms and we will continue to work with the patient in group therapy, milieu

therapy, and adjust the medication as needed.





DD: 04/21/2019 12:13

DT: 04/21/2019 15:48

JOB# [id number]  [id number]

## 2020-08-17 NOTE — ED ADULT TRIAGE NOTE - CHIEF COMPLAINT QUOTE
left lower quadrant abd pain x 3 months worsening today. pt denies nausea, or vomiting. pt reports pain improves with urinating

## 2020-08-18 LAB
APPEARANCE UR: CLEAR — SIGNIFICANT CHANGE UP
BILIRUB UR-MCNC: NEGATIVE — SIGNIFICANT CHANGE UP
COLOR SPEC: SIGNIFICANT CHANGE UP
DIFF PNL FLD: NEGATIVE — SIGNIFICANT CHANGE UP
GLUCOSE UR QL: ABNORMAL
KETONES UR-MCNC: NEGATIVE — SIGNIFICANT CHANGE UP
LEUKOCYTE ESTERASE UR-ACNC: NEGATIVE — SIGNIFICANT CHANGE UP
NITRITE UR-MCNC: NEGATIVE — SIGNIFICANT CHANGE UP
PH UR: 6.5 — SIGNIFICANT CHANGE UP (ref 5–8)
PROT UR-MCNC: NEGATIVE — SIGNIFICANT CHANGE UP
SP GR SPEC: 1.02 — SIGNIFICANT CHANGE UP (ref 1.01–1.02)
UROBILINOGEN FLD QL: SIGNIFICANT CHANGE UP

## 2020-08-18 PROCEDURE — 76870 US EXAM SCROTUM: CPT | Mod: 26

## 2020-08-18 NOTE — ED ADULT NURSE NOTE - OBJECTIVE STATEMENT
pt aox4 complaining of left lower abdominal pain since June. had ct done in past and is following up with GI / urology. iv in place, labs sent.

## 2020-08-19 LAB
CULTURE RESULTS: NO GROWTH — SIGNIFICANT CHANGE UP
SPECIMEN SOURCE: SIGNIFICANT CHANGE UP

## 2020-08-25 ENCOUNTER — APPOINTMENT (OUTPATIENT)
Dept: SURGERY | Facility: CLINIC | Age: 65
End: 2020-08-25
Payer: MEDICAID

## 2020-08-25 VITALS
BODY MASS INDEX: 29.03 KG/M2 | WEIGHT: 185 LBS | HEART RATE: 91 BPM | TEMPERATURE: 98.4 F | HEIGHT: 67 IN | SYSTOLIC BLOOD PRESSURE: 120 MMHG | DIASTOLIC BLOOD PRESSURE: 75 MMHG | OXYGEN SATURATION: 96 %

## 2020-08-25 DIAGNOSIS — Z86.39 PERSONAL HISTORY OF OTHER ENDOCRINE, NUTRITIONAL AND METABOLIC DISEASE: ICD-10-CM

## 2020-08-25 DIAGNOSIS — L90.5 SCAR CONDITIONS AND FIBROSIS OF SKIN: ICD-10-CM

## 2020-08-25 DIAGNOSIS — Z78.9 OTHER SPECIFIED HEALTH STATUS: ICD-10-CM

## 2020-08-25 DIAGNOSIS — Z98.890 SCAR CONDITIONS AND FIBROSIS OF SKIN: ICD-10-CM

## 2020-08-25 DIAGNOSIS — K43.2 INCISIONAL HERNIA W/OUT OBSTRUCTION OR GANGRENE: ICD-10-CM

## 2020-08-25 PROCEDURE — 99203 OFFICE O/P NEW LOW 30 MIN: CPT

## 2020-08-27 PROBLEM — Z78.9 NON-SMOKER: Status: ACTIVE | Noted: 2020-08-25

## 2020-08-27 PROBLEM — Z86.39 HISTORY OF DIABETES MELLITUS: Status: RESOLVED | Noted: 2020-08-25 | Resolved: 2020-08-27

## 2020-08-27 PROBLEM — Z86.39 HISTORY OF DIABETES MELLITUS: Status: RESOLVED | Noted: 2020-08-27 | Resolved: 2020-08-27

## 2020-08-27 PROBLEM — Z86.39 HISTORY OF HYPERCHOLESTEROLEMIA: Status: RESOLVED | Noted: 2020-08-25 | Resolved: 2020-08-27

## 2020-08-27 PROBLEM — K43.2 HERNIA, INCISIONAL: Status: ACTIVE | Noted: 2020-08-27

## 2020-08-27 PROBLEM — L90.5 EXPLORATORY LAPAROTOMY SCAR: Status: ACTIVE | Noted: 2020-08-27

## 2020-08-27 RX ORDER — INSULIN GLARGINE 100 [IU]/ML
100 INJECTION, SOLUTION SUBCUTANEOUS
Refills: 0 | Status: ACTIVE | COMMUNITY

## 2020-08-27 RX ORDER — SEMAGLUTIDE 1.34 MG/ML
2 INJECTION, SOLUTION SUBCUTANEOUS
Refills: 0 | Status: ACTIVE | COMMUNITY

## 2020-08-27 RX ORDER — INSULIN LISPRO 100 U/ML
100 INJECTION, SOLUTION INTRAVENOUS; SUBCUTANEOUS
Refills: 0 | Status: ACTIVE | COMMUNITY

## 2020-08-27 RX ORDER — CHOLECALCIFEROL (VITAMIN D3) 50 MCG
2000 CAPSULE ORAL
Refills: 0 | Status: ACTIVE | COMMUNITY

## 2020-08-27 NOTE — HISTORY OF PRESENT ILLNESS
[de-identified] : 65 year old male presents for increasing discomfort in left groin area and also noted the bulging in area. Discomfort increases with ambulation and standing. Denies any GI symptoms. Patient was seen in ED at [facility] and was diagnosed as hernia and discharged with outpatient follow up

## 2020-08-27 NOTE — ASSESSMENT
[FreeTextEntry1] : 65 year old male with symptomatic left inguinal hernia. options explained and will proceed with open repair with mesh as ambulatory patient. Informed consent was signed.will leave small incisional hernia now as remains asymptomatic

## 2020-08-27 NOTE — PHYSICAL EXAM
[Normal Breath Sounds] : Normal breath sounds [de-identified] : Average built male ambulatory in no distress [Abdominal Masses] : No abdominal masses [Abdomen Tenderness] : ~T ~M No abdominal tenderness [de-identified] : well healed scar chest and drain scars in epigastric area [de-identified] : Abd soft non distended, small incisional hernia reducible over Umbilical area with healed laparotomy scar with contracture. + bulging in left groin with palpable left inguinal hernia with about 2 cm opening

## 2020-09-04 ENCOUNTER — OUTPATIENT (OUTPATIENT)
Dept: OUTPATIENT SERVICES | Facility: HOSPITAL | Age: 65
LOS: 1 days | Discharge: HOME | End: 2020-09-04
Payer: MEDICAID

## 2020-09-04 VITALS
TEMPERATURE: 97 F | HEART RATE: 72 BPM | HEIGHT: 68 IN | DIASTOLIC BLOOD PRESSURE: 57 MMHG | WEIGHT: 179.9 LBS | OXYGEN SATURATION: 98 % | RESPIRATION RATE: 16 BRPM | SYSTOLIC BLOOD PRESSURE: 128 MMHG

## 2020-09-04 DIAGNOSIS — K40.90 UNILATERAL INGUINAL HERNIA, WITHOUT OBSTRUCTION OR GANGRENE, NOT SPECIFIED AS RECURRENT: ICD-10-CM

## 2020-09-04 DIAGNOSIS — Z01.818 ENCOUNTER FOR OTHER PREPROCEDURAL EXAMINATION: ICD-10-CM

## 2020-09-04 DIAGNOSIS — Z98.890 OTHER SPECIFIED POSTPROCEDURAL STATES: Chronic | ICD-10-CM

## 2020-09-04 LAB
A1C WITH ESTIMATED AVERAGE GLUCOSE RESULT: 7.5 % — HIGH (ref 4–5.6)
ALBUMIN SERPL ELPH-MCNC: 4.2 G/DL — SIGNIFICANT CHANGE UP (ref 3.5–5.2)
ALP SERPL-CCNC: 84 U/L — SIGNIFICANT CHANGE UP (ref 30–115)
ALT FLD-CCNC: 35 U/L — SIGNIFICANT CHANGE UP (ref 0–41)
ANION GAP SERPL CALC-SCNC: 10 MMOL/L — SIGNIFICANT CHANGE UP (ref 7–14)
APPEARANCE UR: CLEAR — SIGNIFICANT CHANGE UP
APTT BLD: 33.3 SEC — SIGNIFICANT CHANGE UP (ref 27–39.2)
AST SERPL-CCNC: 18 U/L — SIGNIFICANT CHANGE UP (ref 0–41)
BACTERIA # UR AUTO: NEGATIVE — SIGNIFICANT CHANGE UP
BASOPHILS # BLD AUTO: 0.01 K/UL — SIGNIFICANT CHANGE UP (ref 0–0.2)
BASOPHILS NFR BLD AUTO: 0.2 % — SIGNIFICANT CHANGE UP (ref 0–1)
BILIRUB SERPL-MCNC: 0.3 MG/DL — SIGNIFICANT CHANGE UP (ref 0.2–1.2)
BILIRUB UR-MCNC: NEGATIVE — SIGNIFICANT CHANGE UP
BUN SERPL-MCNC: 17 MG/DL — SIGNIFICANT CHANGE UP (ref 10–20)
CALCIUM SERPL-MCNC: 9.4 MG/DL — SIGNIFICANT CHANGE UP (ref 8.5–10.1)
CHLORIDE SERPL-SCNC: 101 MMOL/L — SIGNIFICANT CHANGE UP (ref 98–110)
CO2 SERPL-SCNC: 27 MMOL/L — SIGNIFICANT CHANGE UP (ref 17–32)
COLOR SPEC: YELLOW — SIGNIFICANT CHANGE UP
CREAT SERPL-MCNC: 1.1 MG/DL — SIGNIFICANT CHANGE UP (ref 0.7–1.5)
DIFF PNL FLD: NEGATIVE — SIGNIFICANT CHANGE UP
EOSINOPHIL # BLD AUTO: 0.11 K/UL — SIGNIFICANT CHANGE UP (ref 0–0.7)
EOSINOPHIL NFR BLD AUTO: 1.7 % — SIGNIFICANT CHANGE UP (ref 0–8)
EPI CELLS # UR: 1 /HPF — SIGNIFICANT CHANGE UP (ref 0–5)
ESTIMATED AVERAGE GLUCOSE: 169 MG/DL — HIGH (ref 68–114)
GLUCOSE SERPL-MCNC: 104 MG/DL — HIGH (ref 70–99)
GLUCOSE UR QL: NEGATIVE — SIGNIFICANT CHANGE UP
HCT VFR BLD CALC: 48.9 % — SIGNIFICANT CHANGE UP (ref 42–52)
HGB BLD-MCNC: 15.7 G/DL — SIGNIFICANT CHANGE UP (ref 14–18)
HYALINE CASTS # UR AUTO: 1 /LPF — SIGNIFICANT CHANGE UP (ref 0–7)
IMM GRANULOCYTES NFR BLD AUTO: 0.3 % — SIGNIFICANT CHANGE UP (ref 0.1–0.3)
INR BLD: 0.97 RATIO — SIGNIFICANT CHANGE UP (ref 0.65–1.3)
KETONES UR-MCNC: NEGATIVE — SIGNIFICANT CHANGE UP
LEUKOCYTE ESTERASE UR-ACNC: NEGATIVE — SIGNIFICANT CHANGE UP
LYMPHOCYTES # BLD AUTO: 2.18 K/UL — SIGNIFICANT CHANGE UP (ref 1.2–3.4)
LYMPHOCYTES # BLD AUTO: 32.9 % — SIGNIFICANT CHANGE UP (ref 20.5–51.1)
MCHC RBC-ENTMCNC: 29.8 PG — SIGNIFICANT CHANGE UP (ref 27–31)
MCHC RBC-ENTMCNC: 32.1 G/DL — SIGNIFICANT CHANGE UP (ref 32–37)
MCV RBC AUTO: 93 FL — SIGNIFICANT CHANGE UP (ref 80–94)
MONOCYTES # BLD AUTO: 0.59 K/UL — SIGNIFICANT CHANGE UP (ref 0.1–0.6)
MONOCYTES NFR BLD AUTO: 8.9 % — SIGNIFICANT CHANGE UP (ref 1.7–9.3)
NEUTROPHILS # BLD AUTO: 3.71 K/UL — SIGNIFICANT CHANGE UP (ref 1.4–6.5)
NEUTROPHILS NFR BLD AUTO: 56 % — SIGNIFICANT CHANGE UP (ref 42.2–75.2)
NITRITE UR-MCNC: NEGATIVE — SIGNIFICANT CHANGE UP
NRBC # BLD: 0 /100 WBCS — SIGNIFICANT CHANGE UP (ref 0–0)
PH UR: 6 — SIGNIFICANT CHANGE UP (ref 5–8)
PLATELET # BLD AUTO: 189 K/UL — SIGNIFICANT CHANGE UP (ref 130–400)
POTASSIUM SERPL-MCNC: 4.6 MMOL/L — SIGNIFICANT CHANGE UP (ref 3.5–5)
POTASSIUM SERPL-SCNC: 4.6 MMOL/L — SIGNIFICANT CHANGE UP (ref 3.5–5)
PROT SERPL-MCNC: 7 G/DL — SIGNIFICANT CHANGE UP (ref 6–8)
PROT UR-MCNC: ABNORMAL
PROTHROM AB SERPL-ACNC: 11.1 SEC — SIGNIFICANT CHANGE UP (ref 9.95–12.87)
RBC # BLD: 5.26 M/UL — SIGNIFICANT CHANGE UP (ref 4.7–6.1)
RBC # FLD: 12.7 % — SIGNIFICANT CHANGE UP (ref 11.5–14.5)
RBC CASTS # UR COMP ASSIST: 2 /HPF — SIGNIFICANT CHANGE UP (ref 0–4)
SODIUM SERPL-SCNC: 138 MMOL/L — SIGNIFICANT CHANGE UP (ref 135–146)
SP GR SPEC: 1.03 — HIGH (ref 1.01–1.02)
UROBILINOGEN FLD QL: SIGNIFICANT CHANGE UP
WBC # BLD: 6.62 K/UL — SIGNIFICANT CHANGE UP (ref 4.8–10.8)
WBC # FLD AUTO: 6.62 K/UL — SIGNIFICANT CHANGE UP (ref 4.8–10.8)
WBC UR QL: 1 /HPF — SIGNIFICANT CHANGE UP (ref 0–5)

## 2020-09-04 PROCEDURE — 93010 ELECTROCARDIOGRAM REPORT: CPT

## 2020-09-04 RX ORDER — LOSARTAN POTASSIUM 100 MG/1
1 TABLET, FILM COATED ORAL
Qty: 0 | Refills: 0 | DISCHARGE

## 2020-09-04 NOTE — H&P PST ADULT - VISION (WITH CORRECTIVE LENSES IF THE PATIENT USUALLY WEARS THEM):
will see patient in ED Normal vision: sees adequately in most situations; can see medication labels, newsprint

## 2020-09-04 NOTE — H&P PST ADULT - REASON FOR ADMISSION
66 yo male presents for PAST in preparation for repair of left inguinal hernia with mesh on 9/11/2020.

## 2020-09-04 NOTE — H&P PST ADULT - MUSCULOSKELETAL
Encounter Date: 6/6/2019       History     Chief Complaint   Patient presents with    Facial Swelling     my whole face is swollen for one week and i have been messing with my chin      33-year-old female presents emergency department for evaluation of 1 week history of redness, swelling and rash to her face.  She reports that started as a small bump to her right cheek that she was messing with.  She states that she was taking and scratching at it and it seemed to get worse.  She reports that she and began to have a small lesion to the left side of her chin a few days ago.  She reports that the chin was swollen but the swelling has result of the last day.  She denies any fever, headache, dizziness, sore throat, abdominal pain, nausea, vomiting or dysuria.  She reports her last menstrual period was 1 week ago.  She denies risk of pregnancy.        Review of patient's allergies indicates:  No Known Allergies  History reviewed. No pertinent past medical history.  History reviewed. No pertinent surgical history.  History reviewed. No pertinent family history.  Social History     Tobacco Use    Smoking status: Never Smoker    Smokeless tobacco: Never Used   Substance Use Topics    Alcohol use: No    Drug use: No     Review of Systems   Constitutional: Negative for activity change, appetite change and fever.   HENT: Negative for congestion, rhinorrhea, sinus pain, sore throat, trouble swallowing and voice change.    Eyes: Negative for visual disturbance.   Respiratory: Negative for cough, chest tightness, shortness of breath and wheezing.    Cardiovascular: Negative for chest pain.   Gastrointestinal: Negative for abdominal pain, diarrhea, nausea and vomiting.   Genitourinary: Negative for decreased urine volume, flank pain, hematuria, vaginal bleeding and vaginal discharge.   Musculoskeletal: Negative for back pain, joint swelling and neck pain.   Skin: Positive for color change and wound.   Neurological: Negative  for dizziness, syncope, light-headedness, numbness and headaches.       Physical Exam     Initial Vitals [06/06/19 1829]   BP Pulse Resp Temp SpO2   129/74 (!) 112 16 98.2 °F (36.8 °C) 100 %      MAP       --         Physical Exam    Nursing note and vitals reviewed.  Constitutional: She appears well-developed and well-nourished. She is not diaphoretic. No distress.   HENT:   Head: Normocephalic and atraumatic.       Right Ear: External ear normal.   Left Ear: External ear normal.   Nose: Nose normal.   Mouth/Throat: Oropharynx is clear and moist.   Eyes: Conjunctivae and EOM are normal. Pupils are equal, round, and reactive to light.   Neck: Normal range of motion. Neck supple.   Cardiovascular: Normal rate, regular rhythm and normal heart sounds. Exam reveals no gallop and no friction rub.    No murmur heard.  Pulmonary/Chest: Breath sounds normal. No respiratory distress. She has no wheezes. She has no rhonchi. She has no rales. She exhibits no tenderness.   Abdominal: Soft. Bowel sounds are normal. She exhibits no distension. There is no tenderness. There is no rebound.   Lymphadenopathy:     She has no cervical adenopathy.   Neurological: She is alert and oriented to person, place, and time.   Skin: Skin is warm and dry.   Psychiatric: She has a normal mood and affect.         ED Course   Procedures  Labs Reviewed - No data to display       Imaging Results    None          Medical Decision Making:   Initial Assessment:   33-year-old female presents for evaluation of facial lesions. Physical exam reveals a nontoxic-appearing female in no acute distress. Patient is afebrile vital signs within normal limits.  Neurological exam reveals an alert and oriented patient.  No facial swelling noted.Two small 1 x 1 cm areas of erythema with overlying honey-colored discharge noted to the left-sided the chin and right cheek.  No surrounding erythema, edema, induration or warmth noted. No vesicles, pustules or bulla noted. No  periorbital or perioral erythema or edema noted. Posterior pharynx reveals no erythema, edema or tonsillar exudate.  Neck is supple, nontender to palpation. Lungs clear to auscultation bilaterally. No respiratory distress or accessory muscle use noted.  Differential Diagnosis:   Impetigo  Possible early cellulitis  No evidence of deep space abscess   ED Management:  Discussed these findings at length patient verbalizes understanding and agreement course of treatment.  Patient requests oral antibiotics as well as the topical for treatment.  Instructed patient to follow up with primary care provider for re-evaluation and to return to the emergency department immediately for any new or worsening symptoms.                      Clinical Impression:       ICD-10-CM ICD-9-CM   1. Impetigo L01.00 684                                Kamla Madden PA-C  06/08/19 0040     No joint pain, swelling or deformity; no limitation of movement

## 2020-09-04 NOTE — H&P PST ADULT - TEACHING/LEARNING CULTURAL CONSIDERATIONS
Reason for call:  Patient reporting a symptom    Symptom or request: Sciatica pain    Duration (how long have symptoms been present): Unknown    Have you been treated for this before? Yes    Additional comments: Pt would like to speak with nurse.    Phone Number patient can be reached at:  Home number on file [phone] (home)    Best Time:  Anytime    Can we leave a detailed message on this number:  NO    Call taken on 7/10/2019 at 10:55 AM by Maria Ines Isidro   none

## 2020-09-04 NOTE — H&P PST ADULT - HISTORY OF PRESENT ILLNESS
Pt complains of abdominal pain associated with hernia. Denies any chest pain, difficulty breathing, SOB, palpitations, dysuria, URI, or any other infections in the last 2 weeks. Denies any recent travel, contact, or exposure to any persons with known or suspected COVID-19. Pt also denies COVID testing within the last 2 weeks. Pt advised to self quarantine until day of procedure. Exercise tolerance of 1 flight of stairs without dyspnea. JOHNNA reviewed with patient.    Anesthesia Alert  NO--Difficult Airway  NO--History of neck surgery or radiation  NO--Limited ROM of neck  NO--History of Malignant hyperthermia  NO--No personal or family history of Pseudocholinesterase deficiency.  NO--Prior Anesthesia Complication  NO--Latex Allergy  NO--Loose teeth  NO--History of Rheumatoid Arthritis  NO--JOHNNA  NO--Other_____ Pt mostly Tajik speaking. Pt refused  services and request son in law (Floyd Owens) to translate. Request confirmed with Radha #[id number]. Radha remained on line to ensure translation was accurate. Pt complains of abdominal pain associated with hernia. Denies any chest pain, difficulty breathing, SOB, palpitations, dysuria, URI, or any other infections in the last 2 weeks. Denies any recent travel, contact, or exposure to any persons with known or suspected COVID-19. Pt also denies COVID testing within the last 2 weeks. Pt advised to self quarantine until day of procedure. Exercise tolerance of 1 flight of stairs without dyspnea. JOHNNA reviewed with patient.    Anesthesia Alert  NO--Difficult Airway  NO--History of neck surgery or radiation  NO--Limited ROM of neck  NO--History of Malignant hyperthermia  NO--No personal or family history of Pseudocholinesterase deficiency.  NO--Prior Anesthesia Complication  NO--Latex Allergy  NO--Loose teeth  NO--History of Rheumatoid Arthritis  NO--JOHNNA  NO--Other_____

## 2020-09-08 ENCOUNTER — LABORATORY RESULT (OUTPATIENT)
Age: 65
End: 2020-09-08

## 2020-09-08 ENCOUNTER — OUTPATIENT (OUTPATIENT)
Dept: OUTPATIENT SERVICES | Facility: HOSPITAL | Age: 65
LOS: 1 days | Discharge: HOME | End: 2020-09-08

## 2020-09-08 DIAGNOSIS — Z98.890 OTHER SPECIFIED POSTPROCEDURAL STATES: Chronic | ICD-10-CM

## 2020-09-09 DIAGNOSIS — Z11.59 ENCOUNTER FOR SCREENING FOR OTHER VIRAL DISEASES: ICD-10-CM

## 2020-09-11 ENCOUNTER — RESULT REVIEW (OUTPATIENT)
Age: 65
End: 2020-09-11

## 2020-09-11 ENCOUNTER — APPOINTMENT (OUTPATIENT)
Dept: SURGERY | Facility: HOSPITAL | Age: 65
End: 2020-09-11
Payer: MEDICAID

## 2020-09-11 ENCOUNTER — OUTPATIENT (OUTPATIENT)
Dept: OUTPATIENT SERVICES | Facility: HOSPITAL | Age: 65
LOS: 1 days | Discharge: HOME | End: 2020-09-11
Payer: MEDICAID

## 2020-09-11 VITALS — DIASTOLIC BLOOD PRESSURE: 65 MMHG | HEART RATE: 85 BPM | SYSTOLIC BLOOD PRESSURE: 129 MMHG | RESPIRATION RATE: 18 BRPM

## 2020-09-11 VITALS
OXYGEN SATURATION: 100 % | SYSTOLIC BLOOD PRESSURE: 148 MMHG | DIASTOLIC BLOOD PRESSURE: 79 MMHG | RESPIRATION RATE: 17 BRPM | HEIGHT: 68 IN | TEMPERATURE: 96 F | WEIGHT: 179.9 LBS | HEART RATE: 74 BPM

## 2020-09-11 DIAGNOSIS — Z98.890 OTHER SPECIFIED POSTPROCEDURAL STATES: Chronic | ICD-10-CM

## 2020-09-11 LAB — GLUCOSE BLDC GLUCOMTR-MCNC: 140 MG/DL — HIGH (ref 70–99)

## 2020-09-11 PROCEDURE — 88302 TISSUE EXAM BY PATHOLOGIST: CPT | Mod: 26

## 2020-09-11 PROCEDURE — 49505 PRP I/HERN INIT REDUC >5 YR: CPT

## 2020-09-11 RX ORDER — SODIUM CHLORIDE 9 MG/ML
1000 INJECTION, SOLUTION INTRAVENOUS
Refills: 0 | Status: DISCONTINUED | OUTPATIENT
Start: 2020-09-11 | End: 2020-09-11

## 2020-09-11 RX ORDER — HYDROMORPHONE HYDROCHLORIDE 2 MG/ML
0.5 INJECTION INTRAMUSCULAR; INTRAVENOUS; SUBCUTANEOUS
Refills: 0 | Status: DISCONTINUED | OUTPATIENT
Start: 2020-09-11 | End: 2020-09-11

## 2020-09-11 RX ORDER — ONDANSETRON 8 MG/1
4 TABLET, FILM COATED ORAL ONCE
Refills: 0 | Status: DISCONTINUED | OUTPATIENT
Start: 2020-09-11 | End: 2020-09-11

## 2020-09-11 RX ORDER — IBUPROFEN 200 MG
1 TABLET ORAL
Qty: 20 | Refills: 0
Start: 2020-09-11 | End: 2020-09-15

## 2020-09-11 RX ADMIN — SODIUM CHLORIDE 75 MILLILITER(S): 9 INJECTION, SOLUTION INTRAVENOUS at 15:26

## 2020-09-11 RX ADMIN — HYDROMORPHONE HYDROCHLORIDE 0.5 MILLIGRAM(S): 2 INJECTION INTRAMUSCULAR; INTRAVENOUS; SUBCUTANEOUS at 15:26

## 2020-09-11 NOTE — ASU DISCHARGE PLAN (ADULT/PEDIATRIC) - CALL YOUR DOCTOR IF YOU HAVE ANY OF THE FOLLOWING:
Bleeding that does not stop/Pain not relieved by Medications/Swelling that gets worse/Fever greater than (need to indicate Fahrenheit or Celsius)/Wound/Surgical Site with redness, or foul smelling discharge or pus Trilobed Flap Text: The defect edges were debeveled with a #15 scalpel blade.  Given the location of the defect and the proximity to free margins a trilobed flap was deemed most appropriate.  Using a sterile surgical marker, an appropriate trilobed flap drawn around the defect.    The area thus outlined was incised deep to adipose tissue with a #15 scalpel blade.  The skin margins were undermined to an appropriate distance in all directions utilizing iris scissors.

## 2020-09-11 NOTE — ASU DISCHARGE PLAN (ADULT/PEDIATRIC) - CARE PROVIDER_API CALL
Pam Paige  General Surgery  [address] [place] [id number]  Phone: [phone]  Fax: [phone]  Follow Up Time: 1 week

## 2020-09-11 NOTE — ASU DISCHARGE PLAN (ADULT/PEDIATRIC) - ASU DC SPECIAL INSTRUCTIONSFT
SURGERY DISCHARGE INSTRUCTIONS    FOLLOW-UP - with Dr. Paige in 1-2 weeks. Call the office to make an appointment or if you have any questions/concerns.    DIET - regular.     ACTIVITY- No heavy lifting until follow up appointment.    WOUNDCARE - Some drainage from your incisions or drain sites is normal. Leave clear outer dressing on until follow up appointment. May shower 24 hours after surgery. No submerging in water.    PAIN MEDS -Take over the counter extra strength tylenol 500mg and ibprofen 600mg with food every 6 hours for pain. A prescription for 600mg ibuprofen was called to your pharmacy.

## 2020-09-11 NOTE — BRIEF OPERATIVE NOTE - NSICDXBRIEFPROCEDURE_GEN_ALL_CORE_FT
PROCEDURES:  Repair, hernia, inguinal, reducible, recurrent, using mesh 11-Sep-2020 15:02:11  Simone Markham

## 2020-09-14 DIAGNOSIS — I25.10 ATHEROSCLEROTIC HEART DISEASE OF NATIVE CORONARY ARTERY WITHOUT ANGINA PECTORIS: ICD-10-CM

## 2020-09-14 DIAGNOSIS — E78.00 PURE HYPERCHOLESTEROLEMIA, UNSPECIFIED: ICD-10-CM

## 2020-09-14 DIAGNOSIS — K40.90 UNILATERAL INGUINAL HERNIA, WITHOUT OBSTRUCTION OR GANGRENE, NOT SPECIFIED AS RECURRENT: ICD-10-CM

## 2020-09-14 DIAGNOSIS — Z79.82 LONG TERM (CURRENT) USE OF ASPIRIN: ICD-10-CM

## 2020-09-14 DIAGNOSIS — E11.9 TYPE 2 DIABETES MELLITUS WITHOUT COMPLICATIONS: ICD-10-CM

## 2020-09-14 DIAGNOSIS — Z79.84 LONG TERM (CURRENT) USE OF ORAL HYPOGLYCEMIC DRUGS: ICD-10-CM

## 2020-09-14 DIAGNOSIS — I10 ESSENTIAL (PRIMARY) HYPERTENSION: ICD-10-CM

## 2020-09-15 LAB — SURGICAL PATHOLOGY STUDY: SIGNIFICANT CHANGE UP

## 2020-09-22 ENCOUNTER — APPOINTMENT (OUTPATIENT)
Dept: SURGERY | Facility: CLINIC | Age: 65
End: 2020-09-22
Payer: MEDICAID

## 2020-09-22 VITALS
SYSTOLIC BLOOD PRESSURE: 125 MMHG | TEMPERATURE: 98.2 F | OXYGEN SATURATION: 98 % | HEIGHT: 67 IN | WEIGHT: 180 LBS | HEART RATE: 91 BPM | BODY MASS INDEX: 28.25 KG/M2 | DIASTOLIC BLOOD PRESSURE: 80 MMHG

## 2020-09-22 DIAGNOSIS — Z48.89 ENCOUNTER FOR OTHER SPECIFIED SURGICAL AFTERCARE: ICD-10-CM

## 2020-09-22 DIAGNOSIS — K40.90 UNILATERAL INGUINAL HERNIA, W/OUT OBSTRUCTION OR GANGRENE, NOT SPECIFIED AS RECURRENT: ICD-10-CM

## 2020-09-22 PROCEDURE — 99024 POSTOP FOLLOW-UP VISIT: CPT

## 2020-09-22 NOTE — HISTORY OF PRESENT ILLNESS
[de-identified] : 65-year-old male status post repair of left inguinal hernia with mesh last week and returns for followup. Patient denies any significant symptoms no bowel or urinary symptoms. Patient's only taking over-the-counter pain medication intermittently

## 2020-09-22 NOTE — DATA REVIEWED
[FreeTextEntry1] : Pathology report was reviewed showing the hernia sac with fibrosis but no other pathology

## 2020-09-22 NOTE — ASSESSMENT
[FreeTextEntry1] : 65-year-old male with status post repair of left inguinal hernia. Patient doing well without any symptoms with known signs of infection. Pathology report was reviewed which was benign. Patient was explained to return to normal activity as tolerated and return to office in 2 months for reevaluation of umbilical incisional hernia for possible surgery if symptomatic

## 2020-09-22 NOTE — PHYSICAL EXAM
[Normal Breath Sounds] : Normal breath sounds [Abdominal Masses] : No abdominal masses [Abdomen Tenderness] : ~T ~M No abdominal tenderness [de-identified] : Average built male ambulatory in no distress [de-identified] : well healed scar chest and drain scars in epigastric area [de-identified] : Abd soft non distended, small incisional hernia reducible over Umbilical area with healed laparotomy scar with contracture. Normal healing scar of recent hernia repair no signs of infection or hematoma. testis normal non tender

## 2020-10-28 NOTE — PATIENT PROFILE ADULT - NSTOBACCONEVERSMOKERY/N_GEN_A
----- Message from Vesta Kang sent at 11/16/2018 10:25 AM CST -----  Contact: self [phone]  Pt is checking on his refill for zolpidem (AMBIEN) 10 mg Tab  .  [id number] - [place], [place] - [address] [place] [address]  Phone: [phone] Fax: [phone]      
No
Statement Selected

## 2020-11-13 NOTE — ED ADULT TRIAGE NOTE - WEIGHT IN LBS
REASON FOR VISIT:   Nishant Michael is a 22 y.o. female with history of pneumonia who is being referred to [facility] Rheumatology at the request of Hank Orantes regarding a diagnosis of recurrent infection. HISTORY OF PRESENT ILLNESS  Feels symptoms started around a 2018 hospitalization shortly after her mother had  from cancer; she was hospitalized for fever, cough, and eventually syncopal event. Diagnosed with pneumonia, Critical access hospital collapse [facility]). Within 2 months she was rehospitalized with similar complaints. Most recently says about 3 months ago she was admitted to the ICU for hematemesis c/b troponin elevation. Didn't require blood transfusion. Heart cath done and she says normal though c/b LUE DVT s/p 3mo of Eliquis. Temps as high as 103 with these hospitalizations, though gets temperatures 100-101 usually at night, estimates 2-4 times per month. Hands seem swollen at night, has difficulty closing fist.     Fingers feel more sensitive to cold, \"ice to the touch. \" Hands turn pale, feet less so. Gets rashes around her neck, last for 2-3 days. Gets red \"wings\" across the cheeks ever since childhood, has been reassured she doesn't have rosacea. Worse in the sun or if she is sick. Has milder shin pains, overall \"bones\" don't seem to bother her. Joint pains are worse, hands in particular are problematic and both swell particularly at night. Gelling but no extended morning stiffness. Gets \"lot of chest pain,\" feels easily winded ever since she was hospitalized in 2018; lifting something off of a shelf will make her short of breath and give her racing heart for a few seconds. Thinks could walk up one but not two flights of stairs. Follows with a Pulmonologist for mild intermittent asthma, her dyspnea feels different than her old wheezing though, so she seldom uses the albuterol. Dry cough persists between hospitalizations.      Has gained about 50 pounds since these problems started (from 200 to 257). Feels she doesn't eat but weight doesn't go down. Last treatment with prednisone was about 2 months ago; she describes prednisone tapers about once every 2-3 months for presumed asthma exacerbations. At 15yo she says she sustained a calcaneal fracture, vertebral compression fractures, and concussion when she fell through a ceiling and landed on her feet below. Most recent available lumbar CT from 2012 did not detect a vertebral compression fracture. 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 Doesn't follow with eye doctor currently. REVIEW OF SYSTEMS  Negatives as follows:  CONSTITUTlONAL:    +fevers, fatigue, weight gain. HEAD/EYES:              +blurred vision, h/a. Denies eye redness, dry eyes, temporal pain  ENT:                            +angular cheilitis by history, childhood sinus infections (none recent). Denies oral/nasal ulcers, dry mouth, hearing changes. RESPIRATORY:         No pleuritic pain, history of pleural effusions, hemoptysis  CARDIOVASCULAR:             Denies chest pain, history of pericardial effusions  GASTRO:                    Denies heartburn, esophageal dysmotility, abdominal pain, nausea, vomiting, diarrhea, blood in the stool  HEMATOLOGIC:        No easy bruising, purpura, swollen lymph nodes  SKIN:                           Denies alopecia, ulcers, nodules   VASCULAR:                +UE edema. Denies cyanosis, raynaud phenomenon  NEUROLOGIC:           Denies specific muscle weakness, paresthesias   PSYCHIATRIC:           +sleep latency. No snoring, depression, anxiety  MSK:                           +paralumbar pain. No morning stiffness >=1 hour, SI joint pain  Review of systems is as above and is otherwise negative.     ALLERGIES  Venom-honey bee and Ceftin [cefuroxime axetil]    MEDICATIONS  Current Outpatient Medications   Medication Sig    EPINEPHrine (EPIPEN) 0.3 mg/0.3 mL injection 0.3 mL by IntraMUSCular route once as needed for 1 dose.  albuterol (PROVENTIL, VENTOLIN) 90 mcg/Actuation inhaler Take 2 Puffs by inhalation every six (6) hours as needed. No current facility-administered medications for this visit. PAST MEDICAL HISTORY  Past Medical History:   Diagnosis Date    Asthma     Asthma     Bipolar 1 disorder ([address]     Compression fracture of lumbar vertebra [facility] [address] [facility]     Depression     Fracture closed, calcaneus        FAMILY HISTORY  family history is not on file. Mother had lupus and rheumatoid arthritis, passed 2/2 complications of breast cancer. Maternal GM had MS. SOCIAL HISTORY  She  reports that she has never smoked. She does not have any smokeless tobacco history on file. She reports that she does not drink alcohol or use drugs. 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 DATA  Visit Vitals  /81 (BP 1 Location: Left arm, BP Patient Position: Sitting)   Pulse 100   Temp 97.7 °F (36.5 °C) (Oral)   Resp 20   Ht 5' (1.524 m)   Wt 257 lb (116.6 kg)   LMP 10/20/2020   SpO2 99%   BMI 50.19 kg/m²    Body mass index is 50.19 kg/m². No flowsheet data found. mHAQ 0.875    General:  The patient is well developed, well nourished, alert, and in no apparent distress. Eyes:  Sclera are anicteric. No conjunctival injection. HEENT:  Oropharynx is clear. No oral ulcers. Adequate salivary pooling. No cervical or supraclavicular lymphadenopathy. Lungs:  Clear to auscultation bilaterally, without wheeze or stridor. Normal respiratory effort. Cor:  Regular rate and rhythm. No murmur rub or gallop. Abdomen: Soft, non-tender, without hepatomegaly or masses. Extremities: No calf tenderness or edema. Mild pretibial tenderness, trace edema. No erythema nodosum.  Warm and well perfused. Skin:  No malar erythema. No psoriaform patches. Grossly normal nailfold capillaries without sclerodactyly or puffy fingers. Neuro: +SLR on right. No foot or wrist drop. Musculoskeletal:    A comprehensive musculoskeletal exam was performed for all joints of each upper and lower extremity and assessed for swelling, tenderness and range of motion. Results are documented as below:  Pan-positive FMTP. Tenderness throughout fingers without localization to particular joints. Bilateral paralumbar tenderness. Resting external rotation of hips. No evidence of synovitis in the small joints of the hands, wrists, shoulders, elbows, hips, knees or ankles. Labs:  2020: Cr 0.86, CMP WNL; WBC 13.1 (78% PMNs, 1.5% eos), Hgb 16.7, Hct 36.8, Plt 406    Imagin20 XR bilateral ankles (result only): Calcific enthesopathies both achilles. 14 CXR: Question of patchy left lower lobe airspace disease. No recent prior studies   available for comparison. Leighann Allen  Ms. Chelsey Hook is a 18[address] [facility] y.o. female who presents for evaluation of recurrent pneumonia, aperiodic fevers, and reportedly elevated troponin amid a recent hospitalization for upper GI bleed. Etiology for the latter is unclear in patient without e/o liver dysfunction, possible Makeda Vivar tear on background of gastroenteritis as demand ischemia seems unlikely without having needed transfusion. Have asked her to fax hospitalization records which will be helpful for more context. From an autoimmune standpoint, recurrent pulmonary infiltrates and fever (particularly with h/o troponinemia) would warrant consideration for lupus, sarcoidosis, or ANCA-associated vasculitis. Cryptogenic organizing pneumonia can happen alongside any connective tissue disease, but she lacks e/o peripheral arthritis or active inflammatory disease today.  Eosinophilic pneumonias can present similarly particularly in patient with h/o asthma, but encouraged her to continue followup with Pulmonary who is better positioned to weigh in on this possibility and pursue bronch if warranted. 1. Obesity, morbid ([address]  -Reviewed four-fold reduction in knee stress with each pound of weight lost  - REFERRAL TO PHYSICAL THERAPY; Future    2. Dyspnea on exertion  - XR CHEST PA LAT; Future    3. Recurrent pneumonia  - ANTI-NEUTROPHIL CYTOPLASMIC AB; Future  - ANTI-NUCLEAR AB BY IFA (RDL); Future  - RHEUMATOID FACTOR, QL; Future  - IMMUNOGLOBULINS, G/A/M, QT.; Future  - XR CHEST PA LAT; Future  - CK; Future  - TROPONIN I; Future  - REFERRAL TO PHYSICAL THERAPY; Future  - CYCLIC CITRUL PEPTIDE AB, IGG; Future  - C REACTIVE PROTEIN, QT; Future  - CBC WITH AUTOMATED DIFF; Future  - METABOLIC PANEL, COMPREHENSIVE; Future  - SED RATE (ESR); Future    4. Lumbar facet arthropathy  - Encouraged trial of TENS  - REFERRAL TO PHYSICAL THERAPY; Future  - celecoxib (CELEBREX) 100 mg capsule; Take 1 Cap by mouth two (2) times a day for 90 days. Dispense: 60 Cap; Refill: 2    5. Elevated troponin level  - TROPONIN I; Future    6. Recurrent infections  - IMMUNOGLOBULINS, G/A/M, QT.; Future    7. Polyarthralgia  - celecoxib (CELEBREX) 100 mg capsule; Take 1 Cap by mouth two (2) times a day for 90 days. Dispense: 60 Cap; Refill: 2  - XR HAND RT MIN 3 V; Future  - XR HAND LT MIN 3 V; Future    8. Fibromyalgia  - CYCLIC CITRUL PEPTIDE AB, IGG; Future  - SED RATE (ESR); Future  - celecoxib (CELEBREX) 100 mg capsule; Take 1 Cap by mouth two (2) times a day for 90 days. Dispense: 60 Cap; Refill: 2      Patient Instructions   1. I don't see any clear evidence of systemic inflammatory disease today, though I agree your symptoms and history are concerning enough to warrant a broad look. 2. [facility] labs at your earliest convenience, I'm looking for evidence of immunodeficiency, ANCA vasculitis, and any evidence of heart inflammation.     3. For pain, keep working with your PCP--I do think you have fibromyalgia contributing to your pain, which can respond to treatments like duloxetine (Cymbalta), amitriptyline (Elavil), or gabapentin (Neurontin). 4. For your low back, I'm referring you to PT for lumbar facet arthropathy and compression fracture--I think TENS may be helpful. 5. Xrays of the hands and chest at your earliest convenience--any [facility] location would be fine so we could see the images. 6. Return in 3 months to see how you're doing, and talk about your results in more detail. Orders Placed This Encounter    XR CHEST PA LAT    XR HAND RT MIN 3 V    XR HAND LT MIN 3 V    ANTI-NEUTROPHIL CYTOPLASMIC AB    ANTI-NUCLEAR AB BY IFA (RDL)    RHEUMATOID FACTOR, QL    IMMUNOGLOBULINS, G/A/M, QT.    CK    TROPONIN I    CYCLIC CITRUL PEPTIDE AB, IGG    C REACTIVE PROTEIN, QT    CBC WITH AUTOMATED DIFF    METABOLIC PANEL, COMPREHENSIVE    SED RATE (ESR)    REFERRAL TO PHYSICAL THERAPY    pantoprazole (Protonix) 40 mg tablet    celecoxib (CELEBREX) 100 mg capsule       Medications: I am having Ruth Ren start on celecoxib. I am also having her maintain her albuterol, EPINEPHrine, and pantoprazole.     Follow up: 3 months    April Minaya MD    Adult Rheumatology   [facility], [address] [place] [address]   Phone [phone]  Fax [phone]

## 2020-12-20 ENCOUNTER — INPATIENT (INPATIENT)
Facility: HOSPITAL | Age: 65
LOS: 0 days | Discharge: HOME | End: 2020-12-21
Attending: INTERNAL MEDICINE | Admitting: INTERNAL MEDICINE
Payer: MEDICAID

## 2020-12-20 VITALS
OXYGEN SATURATION: 96 % | RESPIRATION RATE: 18 BRPM | SYSTOLIC BLOOD PRESSURE: 133 MMHG | DIASTOLIC BLOOD PRESSURE: 73 MMHG | HEIGHT: 68 IN | TEMPERATURE: 99 F | HEART RATE: 107 BPM

## 2020-12-20 DIAGNOSIS — Z98.890 OTHER SPECIFIED POSTPROCEDURAL STATES: Chronic | ICD-10-CM

## 2020-12-20 LAB
ALBUMIN SERPL ELPH-MCNC: 4 G/DL — SIGNIFICANT CHANGE UP (ref 3.5–5.2)
ALP SERPL-CCNC: 65 U/L — SIGNIFICANT CHANGE UP (ref 30–115)
ALT FLD-CCNC: 35 U/L — SIGNIFICANT CHANGE UP (ref 0–41)
ANION GAP SERPL CALC-SCNC: 10 MMOL/L — SIGNIFICANT CHANGE UP (ref 7–14)
APPEARANCE UR: CLEAR — SIGNIFICANT CHANGE UP
APTT BLD: 36.8 SEC — SIGNIFICANT CHANGE UP (ref 27–39.2)
AST SERPL-CCNC: 45 U/L — HIGH (ref 0–41)
BACTERIA # UR AUTO: NEGATIVE — SIGNIFICANT CHANGE UP
BASOPHILS # BLD AUTO: 0.01 K/UL — SIGNIFICANT CHANGE UP (ref 0–0.2)
BASOPHILS NFR BLD AUTO: 0.2 % — SIGNIFICANT CHANGE UP (ref 0–1)
BILIRUB SERPL-MCNC: 0.4 MG/DL — SIGNIFICANT CHANGE UP (ref 0.2–1.2)
BILIRUB UR-MCNC: NEGATIVE — SIGNIFICANT CHANGE UP
BUN SERPL-MCNC: 16 MG/DL — SIGNIFICANT CHANGE UP (ref 10–20)
CALCIUM SERPL-MCNC: 9.3 MG/DL — SIGNIFICANT CHANGE UP (ref 8.5–10.1)
CHLORIDE SERPL-SCNC: 98 MMOL/L — SIGNIFICANT CHANGE UP (ref 98–110)
CO2 SERPL-SCNC: 26 MMOL/L — SIGNIFICANT CHANGE UP (ref 17–32)
COLOR SPEC: YELLOW — SIGNIFICANT CHANGE UP
CREAT SERPL-MCNC: 1.2 MG/DL — SIGNIFICANT CHANGE UP (ref 0.7–1.5)
DIFF PNL FLD: ABNORMAL
EOSINOPHIL # BLD AUTO: 0 K/UL — SIGNIFICANT CHANGE UP (ref 0–0.7)
EOSINOPHIL NFR BLD AUTO: 0 % — SIGNIFICANT CHANGE UP (ref 0–8)
EPI CELLS # UR: 1 /HPF — SIGNIFICANT CHANGE UP (ref 0–5)
GLUCOSE SERPL-MCNC: 213 MG/DL — HIGH (ref 70–99)
GLUCOSE UR QL: ABNORMAL
HCT VFR BLD CALC: 48.3 % — SIGNIFICANT CHANGE UP (ref 42–52)
HGB BLD-MCNC: 16.3 G/DL — SIGNIFICANT CHANGE UP (ref 14–18)
HYALINE CASTS # UR AUTO: 0 /LPF — SIGNIFICANT CHANGE UP (ref 0–7)
IMM GRANULOCYTES NFR BLD AUTO: 0.5 % — HIGH (ref 0.1–0.3)
INR BLD: 1.1 RATIO — SIGNIFICANT CHANGE UP (ref 0.65–1.3)
KETONES UR-MCNC: SIGNIFICANT CHANGE UP
LEUKOCYTE ESTERASE UR-ACNC: NEGATIVE — SIGNIFICANT CHANGE UP
LYMPHOCYTES # BLD AUTO: 0.88 K/UL — LOW (ref 1.2–3.4)
LYMPHOCYTES # BLD AUTO: 15.5 % — LOW (ref 20.5–51.1)
MCHC RBC-ENTMCNC: 29.3 PG — SIGNIFICANT CHANGE UP (ref 27–31)
MCHC RBC-ENTMCNC: 33.7 G/DL — SIGNIFICANT CHANGE UP (ref 32–37)
MCV RBC AUTO: 86.7 FL — SIGNIFICANT CHANGE UP (ref 80–94)
MONOCYTES # BLD AUTO: 0.31 K/UL — SIGNIFICANT CHANGE UP (ref 0.1–0.6)
MONOCYTES NFR BLD AUTO: 5.4 % — SIGNIFICANT CHANGE UP (ref 1.7–9.3)
NEUTROPHILS # BLD AUTO: 4.46 K/UL — SIGNIFICANT CHANGE UP (ref 1.4–6.5)
NEUTROPHILS NFR BLD AUTO: 78.4 % — HIGH (ref 42.2–75.2)
NITRITE UR-MCNC: NEGATIVE — SIGNIFICANT CHANGE UP
NRBC # BLD: 0 /100 WBCS — SIGNIFICANT CHANGE UP (ref 0–0)
PH UR: 6.5 — SIGNIFICANT CHANGE UP (ref 5–8)
PLATELET # BLD AUTO: 146 K/UL — SIGNIFICANT CHANGE UP (ref 130–400)
POTASSIUM SERPL-MCNC: 4.5 MMOL/L — SIGNIFICANT CHANGE UP (ref 3.5–5)
POTASSIUM SERPL-SCNC: 4.5 MMOL/L — SIGNIFICANT CHANGE UP (ref 3.5–5)
PROT SERPL-MCNC: 7.1 G/DL — SIGNIFICANT CHANGE UP (ref 6–8)
PROT UR-MCNC: ABNORMAL
PROTHROM AB SERPL-ACNC: 12.7 SEC — SIGNIFICANT CHANGE UP (ref 9.95–12.87)
RBC # BLD: 5.57 M/UL — SIGNIFICANT CHANGE UP (ref 4.7–6.1)
RBC # FLD: 13.4 % — SIGNIFICANT CHANGE UP (ref 11.5–14.5)
RBC CASTS # UR COMP ASSIST: 1 /HPF — SIGNIFICANT CHANGE UP (ref 0–4)
SODIUM SERPL-SCNC: 134 MMOL/L — LOW (ref 135–146)
SP GR SPEC: 1.02 — SIGNIFICANT CHANGE UP (ref 1.01–1.03)
UROBILINOGEN FLD QL: SIGNIFICANT CHANGE UP
WBC # BLD: 5.69 K/UL — SIGNIFICANT CHANGE UP (ref 4.8–10.8)
WBC # FLD AUTO: 5.69 K/UL — SIGNIFICANT CHANGE UP (ref 4.8–10.8)
WBC UR QL: 1 /HPF — SIGNIFICANT CHANGE UP (ref 0–5)

## 2020-12-20 PROCEDURE — 71045 X-RAY EXAM CHEST 1 VIEW: CPT | Mod: 26

## 2020-12-20 PROCEDURE — 99285 EMERGENCY DEPT VISIT HI MDM: CPT

## 2020-12-20 NOTE — ED ADULT TRIAGE NOTE - CHIEF COMPLAINT QUOTE
pt is pos covid tested 1 week ago. pt c/o blood in the urine. possible uti. c/o pain to the mid and lower back

## 2020-12-21 ENCOUNTER — TRANSCRIPTION ENCOUNTER (OUTPATIENT)
Age: 65
End: 2020-12-21

## 2020-12-21 VITALS
HEART RATE: 104 BPM | DIASTOLIC BLOOD PRESSURE: 67 MMHG | OXYGEN SATURATION: 94 % | RESPIRATION RATE: 20 BRPM | TEMPERATURE: 99 F | SYSTOLIC BLOOD PRESSURE: 100 MMHG

## 2020-12-21 LAB
CK SERPL-CCNC: 438 U/L — HIGH (ref 0–225)
GLUCOSE BLDC GLUCOMTR-MCNC: 129 MG/DL — HIGH (ref 70–99)
RAPID RVP RESULT: DETECTED
SARS-COV-2 RNA SPEC QL NAA+PROBE: DETECTED

## 2020-12-21 PROCEDURE — 99236 HOSP IP/OBS SAME DATE HI 85: CPT

## 2020-12-21 PROCEDURE — 93010 ELECTROCARDIOGRAM REPORT: CPT

## 2020-12-21 PROCEDURE — 74177 CT ABD & PELVIS W/CONTRAST: CPT | Mod: 26

## 2020-12-21 RX ORDER — ACETAMINOPHEN 500 MG
2 TABLET ORAL
Qty: 0 | Refills: 0 | DISCHARGE
Start: 2020-12-21

## 2020-12-21 RX ORDER — SODIUM CHLORIDE 9 MG/ML
1000 INJECTION, SOLUTION INTRAVENOUS ONCE
Refills: 0 | Status: COMPLETED | OUTPATIENT
Start: 2020-12-21 | End: 2020-12-21

## 2020-12-21 RX ORDER — CHLORHEXIDINE GLUCONATE 213 G/1000ML
1 SOLUTION TOPICAL
Refills: 0 | Status: DISCONTINUED | OUTPATIENT
Start: 2020-12-21 | End: 2020-12-21

## 2020-12-21 RX ORDER — PANTOPRAZOLE SODIUM 20 MG/1
40 TABLET, DELAYED RELEASE ORAL
Refills: 0 | Status: DISCONTINUED | OUTPATIENT
Start: 2020-12-21 | End: 2020-12-21

## 2020-12-21 RX ORDER — LOSARTAN POTASSIUM 100 MG/1
25 TABLET, FILM COATED ORAL DAILY
Refills: 0 | Status: DISCONTINUED | OUTPATIENT
Start: 2020-12-21 | End: 2020-12-21

## 2020-12-21 RX ORDER — ACETAMINOPHEN 500 MG
650 TABLET ORAL ONCE
Refills: 0 | Status: COMPLETED | OUTPATIENT
Start: 2020-12-21 | End: 2020-12-21

## 2020-12-21 RX ORDER — TAMSULOSIN HYDROCHLORIDE 0.4 MG/1
1 CAPSULE ORAL
Qty: 30 | Refills: 0
Start: 2020-12-21 | End: 2021-01-19

## 2020-12-21 RX ORDER — ACETAMINOPHEN 500 MG
650 TABLET ORAL EVERY 6 HOURS
Refills: 0 | Status: DISCONTINUED | OUTPATIENT
Start: 2020-12-21 | End: 2020-12-21

## 2020-12-21 RX ORDER — ASPIRIN/CALCIUM CARB/MAGNESIUM 324 MG
81 TABLET ORAL DAILY
Refills: 0 | Status: DISCONTINUED | OUTPATIENT
Start: 2020-12-21 | End: 2020-12-21

## 2020-12-21 RX ORDER — FAMOTIDINE 10 MG/ML
20 INJECTION INTRAVENOUS ONCE
Refills: 0 | Status: COMPLETED | OUTPATIENT
Start: 2020-12-21 | End: 2020-12-21

## 2020-12-21 RX ORDER — ENOXAPARIN SODIUM 100 MG/ML
40 INJECTION SUBCUTANEOUS DAILY
Refills: 0 | Status: DISCONTINUED | OUTPATIENT
Start: 2020-12-21 | End: 2020-12-21

## 2020-12-21 RX ORDER — ACETAMINOPHEN 500 MG
975 TABLET ORAL ONCE
Refills: 0 | Status: COMPLETED | OUTPATIENT
Start: 2020-12-21 | End: 2020-12-21

## 2020-12-21 RX ORDER — TAMSULOSIN HYDROCHLORIDE 0.4 MG/1
0.4 CAPSULE ORAL AT BEDTIME
Refills: 0 | Status: DISCONTINUED | OUTPATIENT
Start: 2020-12-21 | End: 2020-12-21

## 2020-12-21 RX ADMIN — Medication 650 MILLIGRAM(S): at 09:10

## 2020-12-21 RX ADMIN — SODIUM CHLORIDE 1000 MILLILITER(S): 9 INJECTION, SOLUTION INTRAVENOUS at 00:40

## 2020-12-21 RX ADMIN — Medication 975 MILLIGRAM(S): at 05:51

## 2020-12-21 RX ADMIN — FAMOTIDINE 20 MILLIGRAM(S): 10 INJECTION INTRAVENOUS at 02:40

## 2020-12-21 RX ADMIN — SODIUM CHLORIDE 1000 MILLILITER(S): 9 INJECTION, SOLUTION INTRAVENOUS at 09:10

## 2020-12-21 RX ADMIN — ENOXAPARIN SODIUM 40 MILLIGRAM(S): 100 INJECTION SUBCUTANEOUS at 12:06

## 2020-12-21 RX ADMIN — Medication 650 MILLIGRAM(S): at 09:58

## 2020-12-21 RX ADMIN — Medication 81 MILLIGRAM(S): at 12:06

## 2020-12-21 NOTE — DISCHARGE NOTE NURSING/CASE MANAGEMENT/SOCIAL WORK - PATIENT PORTAL LINK FT
You can access the FollowMyHealth Patient Portal offered by [facility] by registering at the following website: [email or web address]. By joining [employer]’s FollowMyHealth portal, you will also be able to view your health information using other applications (apps) compatible with our system.

## 2020-12-21 NOTE — ED PROVIDER NOTE - OBJECTIVE STATEMENT
65 year old male, past medical history HTN, HDL, DM, who presents with hematuria x2 days. Patient with recent +COVID, endorses covid-like symptoms x8 days including generalized body aches, fever, chills, nausea, diarrhea. Patient endorses urgency and hematuria x2 days. No hx similar.

## 2020-12-21 NOTE — DISCHARGE NOTE PROVIDER - HOSPITAL COURSE
HPI:  64 year old male patient with past medical history of peptic ulcer disease secondary to H. pylori, hypertension, hyperlipidemia, benign prostatic hyperplasia, diabetes and coronary artery disease s/p CABG presents for muscle pains and fever for 2 weeks and dark colored urine for 2 days.    Patient reports that he tested positive for COVID last Monday(12/14) but preceding that had fevers, myalgias, nausea and vomiting for 4 days prior. Endorses sick contacts at home including wife who also has similar symptoms. Patient reports fevers intermittent with chills, Tmax of 101.4 with associated dry cough, nausea and vomiting recently which had prevented him from eating for last 2-3 days. He noticed dark color of his urine 2 days ago and decided to presented to ED for evaluation. Patient does endorse difficulty urinating due his enlarged prostate but says has been a chronic issue but reports no dysuria, increase frequency, flank pain, abdominal pain, numbness, tingling or any other complaints.    In ED patient febrile to 100.6, slightly tachycardic to low 100s, normal white count, saturating in above 94% on room air, CT Abdomen Pelvis showed lung infiltrates but negative for any acute abdominal pathology, UA shows small amount of blood but only 1 RBC. Patient at time of interview endorses significant improvement with rehydration in ED and says urine has cleared since then. (21 Dec 2020 07:49)    Patient likely was suffering from myalgias and fevers from COVID and had positive UA for blood but with just 1 PRBC likely with myoglobinuria due to dehydration. Patient after hydration feels better and extensively counselled and answered all questions. Dark colored urine has resolved and the patient is saturating above 94% on Room air. Agrees to be discharged today

## 2020-12-21 NOTE — ED PROVIDER NOTE - ATTENDING CONTRIBUTION TO CARE
66 yo male with PMH HTN, HLD, DM, presented c/o hematuria and frequency x 2 days. Pt also with myalgias, dry cough, fevers, chills, and weakness. + covid test outpatient. + mild SOB with ambulation, denied any cp. + +several episodes NBNB vomiting and loose stools.  not available in Portuguese via [employer] after multiple attempts; pt with limited English translation at bedside, Portuguese translation assisted at bedside by Dr. Bennett.    VITAL SIGNS: noted  CONSTITUTIONAL: Well-developed; well-nourished; in no acute distress  HEAD: Normocephalic; atraumatic  EYES: PERRL, EOM intact; conjunctiva and sclera clear  ENT: No nasal discharge; airway clear. Slight dry MM  NECK: Supple; non tender. No JVD noted  CARD: S1, S2 normal; no murmurs, gallops, or rubs. Regular rate and rhythm  RESP: + bibasilar crackles  ABD: Normal bowel sounds; soft; non-distended; mildly tender; no hepatosplenomegaly. No CVA tenderness  EXT: Normal ROM. No calf tenderness or edema. Distal pulses intact  NEURO: Alert, oriented. Grossly unremarkable. No focal deficits  SKIN: Skin exam is warm and dry

## 2020-12-21 NOTE — ED PROVIDER NOTE - PHYSICAL EXAMINATION
CONSTITUTIONAL: Well-developed; well-nourished; in no acute distress, nontoxic appearing  SKIN: skin exam is warm and dry,  HEAD: Normocephalic; atraumatic.  EYES: PERRL, 3 mm bilateral, no nystagmus, EOM intact; conjunctiva and sclera clear.  ENT: Dry MM, oropharynx non-erythematous, uvula midline   NECK: ROM intact.  CARD: S1, S2 normal, no murmur  RESP: Good air movement bilaterally. No increased WOB   ABD: soft; non-distended; non-tender. No Rebound, No guarding. No CVAT   EXT: Normal ROM.   NEURO: awake, alert, following commands, oriented, grossly unremarkable. No Focal deficits. GCS 15.   PSYCH: Cooperative, appropriate.

## 2020-12-21 NOTE — DISCHARGE NOTE PROVIDER - CARE PROVIDER_API CALL
Matias Marte  Urology  [address] [place] [address], [address]  [place] [address]  Phone: [phone]  Fax: [phone]  Follow Up Time: 1 month    GRACE SOLIZ  [id number]  [address] [place] [address]  Phone: ()-  Fax: ()-  Follow Up Time: 1 week

## 2020-12-21 NOTE — DISCHARGE NOTE PROVIDER - NSDCFUADDINST_GEN_ALL_CORE_FT
Please take Pulse ox at home and report to hospital if less than 90%    Please continue to keep your self well hydrated at all times    Please take Tylenol as prescribed every 6-8h for fevers and myalgias.

## 2020-12-21 NOTE — ED ADULT NURSE NOTE - OBJECTIVE STATEMENT
Pt reports blood in urine starting today with back and bilateral flank pain. Test postitive with COVID with productive cough and low grade fever - max 101 with bodyaches and chills. No CP, SOB.

## 2020-12-21 NOTE — ED PROVIDER NOTE - CLINICAL SUMMARY MEDICAL DECISION MAKING FREE TEXT BOX
pt evaluated for hematuria, weakness and cough, labs unremarkable, CXR consistent with PNA likely secondary to Covid. CT A/P negative for acute pathology, ground glass opacities noted. pt weak and having increased SOB with exertion, admitted for observation and further care.

## 2020-12-21 NOTE — H&P ADULT - NSHPREVIEWOFSYSTEMS_GEN_ALL_CORE
CONSTITUTIONAL: As above  EYES/ENT: No visual changes;  No vertigo or throat pain   NECK: No pain or stiffness  RESPIRATORY: As above  CARDIOVASCULAR: No chest pain or palpitations  GASTROINTESTINAL: No abdominal or epigastric pain. No nausea, vomiting, or hematemesis; No diarrhea or constipation. No melena or hematochezia.  GENITOURINARY: Difficulty urinating  NEUROLOGICAL: No numbness or weakness  All other review of systems is negative unless indicated above.

## 2020-12-21 NOTE — H&P ADULT - HISTORY OF PRESENT ILLNESS
64 year old male patient with past medical history of peptic ulcer disease secondary to H. pylori, hypertension, hyperlipidemia, benign prostatic hyperplasia, diabetes and coronary artery disease s/p CABG presents for muscle pains and fever for 2 weeks and dark colored urine for 2 days.    Patient reports that he tested positive for COVID last Monday(12/14) but preceding that had fevers, myalgias, nausea and vomiting for 4 days prior. Endorses sick contacts at home including wife who also has similar symptoms. Patient reports fevers intermittent with chills, Tmax of 101.4 with associated dry cough, nausea and vomiting recently which had prevented him from eating for last 2-3 days. He noticed dark color of his urine 2 days ago and decided to presented to ED for evaluation. Patient does endorse difficulty urinating due his enlarged prostate but says has been a chronic issue but reports no dysuria, increase frequency, flank pain, abdominal pain, numbness, tingling or any other complaints.    In ED patient febrile to 100.6, slightly tachycardic to low 100s, normal white count, saturating in above 94% on room air, CT Abdomen Pelvis showed lung infiltrates but negative for any acute abdominal pathology, UA shows small amount of blood but only 1 RBC. Patient at time of interview endorses significant improvement with rehydration in ED and says urine has cleared since then. 64 year old male patient with past medical history of peptic ulcer disease secondary to H. pylori, hypertension, hyperlipidemia, benign prostatic hyperplasia, diabetes and coronary artery disease s/p CABG presents for muscle pains and fever for 2 weeks and dark colored urine for 2 days.    Patient reports that he tested positive for COVID last Monday(12/14) but preceding that had fevers, myalgias, nausea and vomiting for 4 days prior. Endorses sick contacts at home including wife who also has similar symptoms. Patient reports fevers intermittent with chills, Tmax of 101.4 with associated dry cough, shortness of breath, nausea and vomiting recently which had prevented him from eating for last 2-3 days. He noticed dark color of his urine 2 days ago and decided to presented to ED for evaluation. Patient does endorse difficulty urinating due his enlarged prostate but says has been a chronic issue but reports no dysuria, increase frequency, flank pain, abdominal pain, numbness, tingling or any other complaints.    In ED patient febrile to 100.6, slightly tachycardic to low 100s, normal white count, saturating in above 94% on room air, CT Abdomen Pelvis showed lung infiltrates but negative for any acute abdominal pathology, UA shows small amount of blood but only 1 RBC. Patient at time of interview endorses significant improvement with rehydration in ED and says urine has cleared since then. 64 year old male patient with past medical history of peptic ulcer disease secondary to H. pylori, hypertension, hyperlipidemia, benign prostatic hyperplasia, diabetes and coronary artery disease s/p CABG presents for muscle pains and fever for 2 weeks and dark colored urine for 2 days.    Patient reports that he tested positive for COVID last Monday(12/14) but preceding that had fevers, myalgias, nausea and vomiting for 4 days prior. Endorses sick contacts at home including wife who also has similar symptoms. Patient reports fevers intermittent with chills, Tmax of 101.4 with associated dry cough, shortness of breath, nausea and vomiting recently which had prevented him from eating for last 2-3 days. He noticed dark color of his urine 2 days ago and decided to presented to ED for evaluation. Patient does endorse difficulty urinating due his enlarged prostate but says has been a chronic issue but reports no dysuria, increase frequency, flank pain, abdominal pain, numbness, tingling or any other complaints.    In ED patient febrile to 100.6, slightly tachycardic to low 100s, normal white count, hemodynamically stable, saturating in above 94% on room air, CT Abdomen Pelvis showed lung infiltrates but negative for any acute abdominal pathology, UA shows small amount of blood but only 1 RBC. Patient at time of interview endorses significant improvement with rehydration in ED and says urine has cleared since then.

## 2020-12-21 NOTE — CHART NOTE - NSCHARTNOTEFT_GEN_A_CORE
Pt is a 65 year old male who was BIBEMS with a c/o blood in his urine and pain to his mid and lower back. Pt tested positive for COVID one week ago.  Pt's PCP is Andressa Marin, as per his daughter Dyan Raphael ([phone]).  Pt obtained this information from his daughter by calling her.  Pt denied needs.
Patient seen and examined today. CK resulted at 438. Possible dc in afternoon

## 2020-12-21 NOTE — DISCHARGE NOTE PROVIDER - NSDCCPCAREPLAN_GEN_ALL_CORE_FT
PRINCIPAL DISCHARGE DIAGNOSIS  Diagnosis: Myoglobinuria  Assessment and Plan of Treatment: You had dark colored urine likely due to dehydration and severe myalgias from the COVID infection you have. You were hydrated and had a CT of the Abdomen done to look for any stones or infection. Your urine did not show any infection and imaging was negative for any stones. Your dark colored urine resolved with fluids and you reported feeling better.  Please follow with Dr. Marte outpatient  Please follow with Dr. Marin in 1-2 weeks time      SECONDARY DISCHARGE DIAGNOSES  Diagnosis: BPH without urinary obstruction  Assessment and Plan of Treatment: You report difficulty urinating due to enlarged prostate. A medication is added to your regimen to improve your symptoms. Please follow with your Urologist in 2-4 weeks time for follow up    Diagnosis: Pneumonia due to COVID-19 virus  Assessment and Plan of Treatment: You were tested positive for COVID outpatient and repeat test was also positive. Your have low grade fevers with other complaints from the infection. CT imaging also showed changes in your lung but oxygen levels remain above 94%. You will not require treatment with any drugs for now.  Please check your oxygen levels at home. If less than 90% report to the hospital  You might continue to feels short of breath for sometime due to lung damage from the infection. Please increase your activity gradually to increase your tolerance.

## 2020-12-21 NOTE — H&P ADULT - ASSESSMENT
64 year old male patient with past medical history of peptic ulcer disease secondary to H. pylori, hypertension, hyperlipidemia, benign prostatic hyperplasia, diabetes and coronary artery disease s/p CABG presents for muscle pains and fever for 2 weeks and dark colored urine for 2 days.    Patient likely suffering from myalgias and fevers from COVID and given positive UA for blood but with just 1 PRBC likely had myoglobinuria due to dehydration. Patient after hydration feels better and extensively counselled and answered all questions. Agrees to be discharged today.    # Fever, Myalgias and Shortness of Beath 2/2 COVID Pneumonia  - Febrile at home to 101.4, febrile in ED also to 100.6,   - Saturating more than 94% on room air  - Lymphopenia present, mild transaminitis +ve, COVID +ve(since 12/14)  - CT Chest shows basilar opacities  - s/p 2L of LR in ED  - Not a candidate for any COVID treatment  - Says symptoms have been improving over the last 12-24h  - Can monitor O2 at home if needed  - No need for in patient monitoring for now    # Dark Colored urine likely Myoglobinuria  - 2 days of dark colored urine  - Minimal oral intake as per patient during the time with severe myalgias  - 1 RBC in urine only with small blood likely Myoglobinuria  - CT Abdomen and Pelvis unrevealing for etiology  - No flank pain, h/o renal stones or any abdominal complaints  - s/p 2L of LR in ED with resolution of symptoms  - Will give 1L of LR Bolus and then can hydrate orally  - Will get CK levels  - Can hold statin for now and can resume later    # DM Type 2  - Hold oral meds(Metformin)  - Takes Ozempic and basal plus sliding scale at home  - Will give 30 basal(home dose) with 5 with meals for now    # H/O CAD s/p CABG  # HTN  # DLD  - Continue Aspirin, Losartan and hold statin for now    # Possible Vitamin D deficiency  - Continue supplementation on D/C    # BPH  - Was following with Dr. Marte but has not been able to see him for some time  - Can add Tamsulosin as patient symptomatic    DVT: Lovenox  GI: Pantoprazole  Diet: Carb Consistent  Dispo: Likely Today     64 year old male patient with past medical history of peptic ulcer disease secondary to H. pylori, hypertension, hyperlipidemia, benign prostatic hyperplasia, diabetes and coronary artery disease s/p CABG presents for muscle pains and fever for 2 weeks and dark colored urine for 2 days.    Patient likely suffering from myalgias and fevers from COVID and given positive UA for blood but with just 1 PRBC likely had myoglobinuria due to dehydration. Patient after hydration feels better and extensively counselled and answered all questions. Agrees to be discharged today.    # Fever, Myalgias and Shortness of Beath 2/2 COVID Pneumonia  - Febrile at home to 101.4, febrile in ED also to 100.6,   - Saturating more than 94% on room air  - Lymphopenia present, mild transaminitis +ve, COVID +ve(since 12/14)  - CT Chest shows basilar opacities  - s/p 2L of LR in ED  - Not a candidate for any COVID treatment  - Says symptoms have been improving over the last 12-24h  - Can monitor O2 at home if needed  - No need for in patient monitoring for now    # Dark Colored urine likely Myoglobinuria  - 2 days of dark colored urine  - Minimal oral intake as per patient during the time with severe myalgias  - 1 RBC in urine only with small blood likely Myoglobinuria  - CT Abdomen and Pelvis unrevealing for etiology  - No flank pain, h/o renal stones or any abdominal complaints  - s/p 2L of LR in ED with resolution of symptoms  - Will give 1L of LR Bolus and then can hydrate orally  - Will get CK levels  - Can hold statin for now and can resume later    # DM Type 2  - Hold oral meds(Metformin)  - Takes Ozempic and basal plus sliding scale at home  - Will give 30 basal(home dose) with 5 with meals for now    # H/O CAD s/p CABG  # HTN  # DLD  - Continue Aspirin, Losartan and hold statin for now    # Possible Vitamin D deficiency  - Continue supplementation on D/C    # BPH  - Was following with Dr. Marte but has not been able to see him for some time  - Can add Tamsulosin as patient symptomatic    DVT: Lovenox  GI: Pantoprazole  Diet: Carb Consistent  Dispo: Likely Today    Will need Scripts for Tylenol, Tamsulosin if continued on D/C   64 year old male patient with past medical history of peptic ulcer disease secondary to H. pylori, hypertension, hyperlipidemia, benign prostatic hyperplasia, diabetes and coronary artery disease s/p CABG presents for muscle pains and fever for 2 weeks and dark colored urine for 2 days.    Patient likely suffering from myalgias and fevers from COVID and given positive UA for blood but with just 1 PRBC likely had myoglobinuria due to dehydration. Patient after hydration feels better and extensively counselled and answered all questions. Agrees to be discharged today.    # Fever, Myalgias and Shortness of Beath 2/2 COVID Pneumonia  - Febrile at home to 101.4, febrile in ED also to 100.6,   - Saturating more than 94% on room air  - Lymphopenia present, mild transaminitis +ve, COVID +ve(since 12/14)  - CT Chest shows basilar opacities  - s/p 2L of LR in ED  - Not a candidate for any COVID treatment  - Says symptoms have been improving over the last 12-24h  - Can monitor O2 at home if needed  - No need for in patient monitoring for now    # Dark Colored urine likely Myoglobinuria  - 2 days of dark colored urine  - Minimal oral intake as per patient during the time with severe myalgias  - 1 RBC in urine only with small blood likely Myoglobinuria  - CT Abdomen and Pelvis unrevealing for etiology  - No flank pain, h/o renal stones or any abdominal complaints  - s/p 2L of LR in ED with resolution of symptoms  - Will give 1L of LR Bolus and then can hydrate orally  - Will get CK levels(Added to collected labs)  - Can hold statin for now and can resume later    # DM Type 2  - Hold oral meds(Metformin)  - Takes Ozempic and basal plus sliding scale at home  - Will give 30 basal(home dose) with 5 with meals for now    # H/O CAD s/p CABG  # HTN  # DLD  - Continue Aspirin, Losartan and hold statin for now    # Possible Vitamin D deficiency  - Continue supplementation on D/C    # BPH  - Was following with Dr. Marte but has not been able to see him for some time  - Can add Tamsulosin as patient symptomatic    DVT: Lovenox  GI: Pantoprazole  Diet: Carb Consistent  Dispo: Likely Today    Will need Scripts for Tylenol, Tamsulosin if continued on D/C

## 2020-12-21 NOTE — H&P ADULT - NSHPPHYSICALEXAM_GEN_ALL_CORE
GENERAL: NAD, appears dehydrated  HEAD:  Atraumatic, Normocephalic  ENT: Dry mucous membranes  CHEST/LUNG: Clear to auscultation bilaterally  HEART: Regular rate and rhythm  ABDOMEN: Bowel sounds present; Soft, Nontender, Nondistended. No hepatomegaly  EXTREMITIES: No Peripheral edema  NERVOUS SYSTEM:  Alert & Oriented X3, speech clear. No deficits   MSK: FROM all 4 extremities, full and equal strength  SKIN: No rashes or lesions

## 2020-12-21 NOTE — ED PROVIDER NOTE - NS ED ROS FT
I suspect your symptoms are related to your doxazosin.  Please stop this and drink plenty of fluids.  You may have a mild urinary tract infection, we will start antibiotics for this and obtain urine culture.  Follow-up with Dr. Landers regarding your Holter monitor.  Return if progressing or worsening symptoms.   Review of Systems:  	•	CONSTITUTIONAL: no fever, no diaphoresis, no chills  	•	SKIN: no rash  	•	HEMATOLOGIC: no bleeding, no bruising  	•	EYES: no eye pain, no blurry vision  	•	ENT: no difficulty swallowing   	•	RESPIRATORY: no shortness of breath, no cough  	•	CARDIAC: no chest pain, no palpitations  	•	GI: +diarrhea. no abd pain, no vomiting   	•	GENITO-URINARY: +hematuria, +urgency. no frequency, no discharge   	•	MUSCULOSKELETAL: no joint paint, no swelling, no redness  	•	NEUROLOGIC: +generalized weakness, no paresthesias, no syncope, no loc

## 2020-12-21 NOTE — DISCHARGE NOTE PROVIDER - PROVIDER TOKENS
PROVIDER:[TOKEN:[[id number]],FOLLOWUP:[1 month]],PROVIDER:[TOKEN:[[id number]],FOLLOWUP:[1 week]]

## 2020-12-21 NOTE — H&P ADULT - ATTENDING COMMENTS
63 yo male with PMH of HTN, DM type 2, CAD s/p CABG, BPH, peptic ulcer disease came to ED for dark urine and weakness. He started with weakness, muscle aches and fever one week ago, he was tested positive for COVID-19 on 12/14. For the last two days he noticed dark urine  like a blood and he continued with fever with mild cough and SOB. He denies abdominal pain, no dysuria or polyuria. In the ED his vital signs T 100.6F, HR 100s, labs were normal, UA positive for slight blood. CT abdomen and Pelvis negative.     PHYSICAL EXAM:  GENERAL: NAD, well-developed.  HEAD:  Atraumatic, Normocephalic.  EYES: EOMI, PERRLA, conjunctiva and sclera clear.  NECK: Supple, No JVD.  CHEST/LUNG: Clear to auscultation bilaterally; No wheeze.  HEART: Regular rate and rhythm; S1 S2.   ABDOMEN: Soft, Nontender, Nondistended; Bowel sounds present.  EXTREMITIES:  2+ Peripheral Pulses, No clubbing, cyanosis, or edema.  PSYCH: AAOx3.  NEUROLOGY: non-focal.  SKIN: No rashes or lesions.    A/P:   COVID-19 Pneumonia:  Fatigue, SOB, cough and fever.   Chest CT showed bibasilar ground glass opacity, No hypoxia.  No need for treatment.     Hematouria: likely from myoglobinuria.   Abdomen CT is negative for Stones or masses.   CK mildly elevated. advised with oral hydration.     Continue home medications for DM and CAD.

## 2020-12-21 NOTE — H&P ADULT - NSHPLABSRESULTS_GEN_ALL_CORE
16.3   5.69  )-----------( 146      ( 20 Dec 2020 21:40 )             48.3       12-20    134<L>  |  98  |  16  ----------------------------<  213<H>  4.5   |  26  |  1.2    Ca    9.3      20 Dec 2020 21:40    TPro  7.1  /  Alb  4.0  /  TBili  0.4  /  DBili  x   /  AST  45<H>  /  ALT  35  /  AlkPhos  65  12-20              Urinalysis Basic - ( 20 Dec 2020 21:12 )    Color: Yellow / Appearance: Clear / S.018 / pH: x  Gluc: x / Ketone: Trace  / Bili: Negative / Urobili: <2 mg/dL   Blood: x / Protein: 30 mg/dL / Nitrite: Negative   Leuk Esterase: Negative / RBC: 1 /HPF / WBC 1 /HPF   Sq Epi: x / Non Sq Epi: 1 /HPF / Bacteria: Negative        PT/INR - ( 20 Dec 2020 21:40 )   PT: 12.70 sec;   INR: 1.10 ratio         PTT - ( 20 Dec 2020 21:40 )  PTT:36.8 sec          CAPILLARY BLOOD GLUCOSE      POCT Blood Glucose.: 129 mg/dL (21 Dec 2020 08:40)

## 2020-12-21 NOTE — DISCHARGE NOTE PROVIDER - NSDCMRMEDTOKEN_GEN_ALL_CORE_FT
Actamin 325 mg oral tablet: 2 tab(s) orally every 6 hours, As needed, Temp greater or equal to 38C (100.4F)  Admelog 100 units/mL injectable solution:   aspirin 81 mg oral tablet: 1 tab(s) orally once a day  Basaglar KwikPen 100 units/mL subcutaneous solution: 20 unit(s) subcutaneous once a day (at bedtime)  losartan 25 mg oral tablet: 1 tab(s) orally once a day  metFORMIN 1000 mg oral tablet: 1 tab(s) orally 2 times a day  omeprazole 40 mg oral delayed release capsule: 1 cap(s) orally once a day  Oyster Shell Calcium with Vitamin D 500 mg-200 intl units (5 mcg) oral tablet: 1 tab(s) orally once a day  Ozempic (0.25 mg or 0.5 mg dose): 1 dose(s) subcutaneous once a week  pravastatin 20 mg oral tablet: 1 tab(s) orally once a day  Vitamin D3 50,000 intl units (1250 mcg) oral capsule: 1 cap(s) orally once a week

## 2020-12-22 ENCOUNTER — TRANSCRIPTION ENCOUNTER (OUTPATIENT)
Age: 65
End: 2020-12-22

## 2020-12-22 LAB
CULTURE RESULTS: SIGNIFICANT CHANGE UP
SPECIMEN SOURCE: SIGNIFICANT CHANGE UP

## 2020-12-23 ENCOUNTER — TRANSCRIPTION ENCOUNTER (OUTPATIENT)
Age: 65
End: 2020-12-23

## 2020-12-23 DIAGNOSIS — E55.9 VITAMIN D DEFICIENCY, UNSPECIFIED: ICD-10-CM

## 2020-12-23 DIAGNOSIS — R31.9 HEMATURIA, UNSPECIFIED: ICD-10-CM

## 2020-12-23 DIAGNOSIS — Z79.84 LONG TERM (CURRENT) USE OF ORAL HYPOGLYCEMIC DRUGS: ICD-10-CM

## 2020-12-23 DIAGNOSIS — I25.10 ATHEROSCLEROTIC HEART DISEASE OF NATIVE CORONARY ARTERY WITHOUT ANGINA PECTORIS: ICD-10-CM

## 2020-12-23 DIAGNOSIS — U07.1 COVID-19: ICD-10-CM

## 2020-12-23 DIAGNOSIS — Z95.1 PRESENCE OF AORTOCORONARY BYPASS GRAFT: ICD-10-CM

## 2020-12-23 DIAGNOSIS — E86.0 DEHYDRATION: ICD-10-CM

## 2020-12-23 DIAGNOSIS — E78.5 HYPERLIPIDEMIA, UNSPECIFIED: ICD-10-CM

## 2020-12-23 DIAGNOSIS — R82.1 MYOGLOBINURIA: ICD-10-CM

## 2020-12-23 DIAGNOSIS — E11.9 TYPE 2 DIABETES MELLITUS WITHOUT COMPLICATIONS: ICD-10-CM

## 2020-12-23 DIAGNOSIS — Z79.82 LONG TERM (CURRENT) USE OF ASPIRIN: ICD-10-CM

## 2020-12-23 DIAGNOSIS — I10 ESSENTIAL (PRIMARY) HYPERTENSION: ICD-10-CM

## 2020-12-23 DIAGNOSIS — N40.0 BENIGN PROSTATIC HYPERPLASIA WITHOUT LOWER URINARY TRACT SYMPTOMS: ICD-10-CM

## 2020-12-23 DIAGNOSIS — J12.89 OTHER VIRAL PNEUMONIA: ICD-10-CM

## 2020-12-28 ENCOUNTER — TRANSCRIPTION ENCOUNTER (OUTPATIENT)
Age: 65
End: 2020-12-28

## 2021-02-04 ENCOUNTER — APPOINTMENT (OUTPATIENT)
Dept: UROLOGY | Facility: CLINIC | Age: 66
End: 2021-02-04

## 2021-07-22 ENCOUNTER — INPATIENT (INPATIENT)
Facility: HOSPITAL | Age: 66
LOS: 2 days | Discharge: HOME | End: 2021-07-25
Attending: STUDENT IN AN ORGANIZED HEALTH CARE EDUCATION/TRAINING PROGRAM | Admitting: STUDENT IN AN ORGANIZED HEALTH CARE EDUCATION/TRAINING PROGRAM
Payer: MEDICARE

## 2021-07-22 VITALS
RESPIRATION RATE: 17 BRPM | HEIGHT: 68 IN | WEIGHT: 169.98 LBS | HEART RATE: 102 BPM | TEMPERATURE: 100 F | OXYGEN SATURATION: 100 % | SYSTOLIC BLOOD PRESSURE: 148 MMHG | DIASTOLIC BLOOD PRESSURE: 76 MMHG

## 2021-07-22 DIAGNOSIS — Z98.890 OTHER SPECIFIED POSTPROCEDURAL STATES: Chronic | ICD-10-CM

## 2021-07-22 LAB
ALBUMIN SERPL ELPH-MCNC: 3.7 G/DL — SIGNIFICANT CHANGE UP (ref 3.5–5.2)
ALP SERPL-CCNC: 83 U/L — SIGNIFICANT CHANGE UP (ref 30–115)
ALT FLD-CCNC: 23 U/L — SIGNIFICANT CHANGE UP (ref 0–41)
ANION GAP SERPL CALC-SCNC: 10 MMOL/L — SIGNIFICANT CHANGE UP (ref 7–14)
APPEARANCE UR: CLEAR — SIGNIFICANT CHANGE UP
AST SERPL-CCNC: 20 U/L — SIGNIFICANT CHANGE UP (ref 0–41)
BACTERIA # UR AUTO: NEGATIVE — SIGNIFICANT CHANGE UP
BASOPHILS # BLD AUTO: 0.01 K/UL — SIGNIFICANT CHANGE UP (ref 0–0.2)
BASOPHILS NFR BLD AUTO: 0.1 % — SIGNIFICANT CHANGE UP (ref 0–1)
BILIRUB SERPL-MCNC: 0.6 MG/DL — SIGNIFICANT CHANGE UP (ref 0.2–1.2)
BILIRUB UR-MCNC: NEGATIVE — SIGNIFICANT CHANGE UP
BUN SERPL-MCNC: 20 MG/DL — SIGNIFICANT CHANGE UP (ref 10–20)
CALCIUM SERPL-MCNC: 9.3 MG/DL — SIGNIFICANT CHANGE UP (ref 8.5–10.1)
CHLORIDE SERPL-SCNC: 102 MMOL/L — SIGNIFICANT CHANGE UP (ref 98–110)
CO2 SERPL-SCNC: 25 MMOL/L — SIGNIFICANT CHANGE UP (ref 17–32)
COLOR SPEC: SIGNIFICANT CHANGE UP
CREAT SERPL-MCNC: 1.2 MG/DL — SIGNIFICANT CHANGE UP (ref 0.7–1.5)
DIFF PNL FLD: SIGNIFICANT CHANGE UP
EOSINOPHIL # BLD AUTO: 0.04 K/UL — SIGNIFICANT CHANGE UP (ref 0–0.7)
EOSINOPHIL NFR BLD AUTO: 0.3 % — SIGNIFICANT CHANGE UP (ref 0–8)
EPI CELLS # UR: 0 /HPF — SIGNIFICANT CHANGE UP (ref 0–5)
ERYTHROCYTE [SEDIMENTATION RATE] IN BLOOD: 46 MM/HR — HIGH (ref 0–10)
GLUCOSE BLDC GLUCOMTR-MCNC: 192 MG/DL — HIGH (ref 70–99)
GLUCOSE BLDC GLUCOMTR-MCNC: 226 MG/DL — HIGH (ref 70–99)
GLUCOSE BLDC GLUCOMTR-MCNC: 233 MG/DL — HIGH (ref 70–99)
GLUCOSE SERPL-MCNC: 241 MG/DL — HIGH (ref 70–99)
GLUCOSE UR QL: ABNORMAL
HCT VFR BLD CALC: 41.1 % — LOW (ref 42–52)
HGB BLD-MCNC: 13.6 G/DL — LOW (ref 14–18)
HYALINE CASTS # UR AUTO: 0 /LPF — SIGNIFICANT CHANGE UP (ref 0–7)
IMM GRANULOCYTES NFR BLD AUTO: 0.4 % — HIGH (ref 0.1–0.3)
KETONES UR-MCNC: NEGATIVE — SIGNIFICANT CHANGE UP
LACTATE SERPL-SCNC: 1 MMOL/L — SIGNIFICANT CHANGE UP (ref 0.7–2)
LEUKOCYTE ESTERASE UR-ACNC: ABNORMAL
LYMPHOCYTES # BLD AUTO: 1.49 K/UL — SIGNIFICANT CHANGE UP (ref 1.2–3.4)
LYMPHOCYTES # BLD AUTO: 11 % — LOW (ref 20.5–51.1)
MCHC RBC-ENTMCNC: 30 PG — SIGNIFICANT CHANGE UP (ref 27–31)
MCHC RBC-ENTMCNC: 33.1 G/DL — SIGNIFICANT CHANGE UP (ref 32–37)
MCV RBC AUTO: 90.7 FL — SIGNIFICANT CHANGE UP (ref 80–94)
MONOCYTES # BLD AUTO: 1.14 K/UL — HIGH (ref 0.1–0.6)
MONOCYTES NFR BLD AUTO: 8.4 % — SIGNIFICANT CHANGE UP (ref 1.7–9.3)
NEUTROPHILS # BLD AUTO: 10.83 K/UL — HIGH (ref 1.4–6.5)
NEUTROPHILS NFR BLD AUTO: 79.8 % — HIGH (ref 42.2–75.2)
NITRITE UR-MCNC: NEGATIVE — SIGNIFICANT CHANGE UP
NRBC # BLD: 0 /100 WBCS — SIGNIFICANT CHANGE UP (ref 0–0)
PH UR: 6 — SIGNIFICANT CHANGE UP (ref 5–8)
PLATELET # BLD AUTO: 179 K/UL — SIGNIFICANT CHANGE UP (ref 130–400)
POTASSIUM SERPL-MCNC: 4.5 MMOL/L — SIGNIFICANT CHANGE UP (ref 3.5–5)
POTASSIUM SERPL-SCNC: 4.5 MMOL/L — SIGNIFICANT CHANGE UP (ref 3.5–5)
PROT SERPL-MCNC: 6.6 G/DL — SIGNIFICANT CHANGE UP (ref 6–8)
PROT UR-MCNC: ABNORMAL
RBC # BLD: 4.53 M/UL — LOW (ref 4.7–6.1)
RBC # FLD: 13.3 % — SIGNIFICANT CHANGE UP (ref 11.5–14.5)
RBC CASTS # UR COMP ASSIST: 3 /HPF — SIGNIFICANT CHANGE UP (ref 0–4)
SARS-COV-2 RNA SPEC QL NAA+PROBE: SIGNIFICANT CHANGE UP
SODIUM SERPL-SCNC: 137 MMOL/L — SIGNIFICANT CHANGE UP (ref 135–146)
SP GR SPEC: 1.02 — SIGNIFICANT CHANGE UP (ref 1.01–1.03)
TROPONIN T SERPL-MCNC: <0.01 NG/ML — SIGNIFICANT CHANGE UP
UROBILINOGEN FLD QL: SIGNIFICANT CHANGE UP
WBC # BLD: 13.56 K/UL — HIGH (ref 4.8–10.8)
WBC # FLD AUTO: 13.56 K/UL — HIGH (ref 4.8–10.8)
WBC UR QL: 121 /HPF — HIGH (ref 0–5)

## 2021-07-22 PROCEDURE — 74177 CT ABD & PELVIS W/CONTRAST: CPT | Mod: 26,MA

## 2021-07-22 PROCEDURE — 71046 X-RAY EXAM CHEST 2 VIEWS: CPT | Mod: 26

## 2021-07-22 PROCEDURE — 99223 1ST HOSP IP/OBS HIGH 75: CPT

## 2021-07-22 PROCEDURE — 99285 EMERGENCY DEPT VISIT HI MDM: CPT | Mod: CS

## 2021-07-22 PROCEDURE — 93010 ELECTROCARDIOGRAM REPORT: CPT

## 2021-07-22 RX ORDER — INSULIN LISPRO 100/ML
VIAL (ML) SUBCUTANEOUS
Refills: 0 | Status: DISCONTINUED | OUTPATIENT
Start: 2021-07-22 | End: 2021-07-25

## 2021-07-22 RX ORDER — DEXTROSE 50 % IN WATER 50 %
25 SYRINGE (ML) INTRAVENOUS ONCE
Refills: 0 | Status: DISCONTINUED | OUTPATIENT
Start: 2021-07-22 | End: 2021-07-25

## 2021-07-22 RX ORDER — CEFTRIAXONE 500 MG/1
1000 INJECTION, POWDER, FOR SOLUTION INTRAMUSCULAR; INTRAVENOUS ONCE
Refills: 0 | Status: COMPLETED | OUTPATIENT
Start: 2021-07-22 | End: 2021-07-22

## 2021-07-22 RX ORDER — SODIUM CHLORIDE 9 MG/ML
1000 INJECTION, SOLUTION INTRAVENOUS
Refills: 0 | Status: DISCONTINUED | OUTPATIENT
Start: 2021-07-22 | End: 2021-07-25

## 2021-07-22 RX ORDER — ENOXAPARIN SODIUM 100 MG/ML
40 INJECTION SUBCUTANEOUS AT BEDTIME
Refills: 0 | Status: DISCONTINUED | OUTPATIENT
Start: 2021-07-22 | End: 2021-07-25

## 2021-07-22 RX ORDER — DEXTROSE 50 % IN WATER 50 %
12.5 SYRINGE (ML) INTRAVENOUS ONCE
Refills: 0 | Status: DISCONTINUED | OUTPATIENT
Start: 2021-07-22 | End: 2021-07-25

## 2021-07-22 RX ORDER — CHLORHEXIDINE GLUCONATE 213 G/1000ML
1 SOLUTION TOPICAL
Refills: 0 | Status: DISCONTINUED | OUTPATIENT
Start: 2021-07-22 | End: 2021-07-25

## 2021-07-22 RX ORDER — ASPIRIN/CALCIUM CARB/MAGNESIUM 324 MG
81 TABLET ORAL DAILY
Refills: 0 | Status: DISCONTINUED | OUTPATIENT
Start: 2021-07-22 | End: 2021-07-25

## 2021-07-22 RX ORDER — ACETAMINOPHEN 500 MG
650 TABLET ORAL ONCE
Refills: 0 | Status: COMPLETED | OUTPATIENT
Start: 2021-07-22 | End: 2021-07-22

## 2021-07-22 RX ORDER — SODIUM CHLORIDE 9 MG/ML
2000 INJECTION, SOLUTION INTRAVENOUS ONCE
Refills: 0 | Status: COMPLETED | OUTPATIENT
Start: 2021-07-22 | End: 2021-07-22

## 2021-07-22 RX ORDER — PANTOPRAZOLE SODIUM 20 MG/1
40 TABLET, DELAYED RELEASE ORAL
Refills: 0 | Status: DISCONTINUED | OUTPATIENT
Start: 2021-07-22 | End: 2021-07-25

## 2021-07-22 RX ORDER — CEFTRIAXONE 500 MG/1
1000 INJECTION, POWDER, FOR SOLUTION INTRAMUSCULAR; INTRAVENOUS EVERY 24 HOURS
Refills: 0 | Status: DISCONTINUED | OUTPATIENT
Start: 2021-07-22 | End: 2021-07-25

## 2021-07-22 RX ORDER — DEXTROSE 50 % IN WATER 50 %
15 SYRINGE (ML) INTRAVENOUS ONCE
Refills: 0 | Status: DISCONTINUED | OUTPATIENT
Start: 2021-07-22 | End: 2021-07-25

## 2021-07-22 RX ORDER — TAMSULOSIN HYDROCHLORIDE 0.4 MG/1
0.4 CAPSULE ORAL AT BEDTIME
Refills: 0 | Status: DISCONTINUED | OUTPATIENT
Start: 2021-07-22 | End: 2021-07-25

## 2021-07-22 RX ORDER — CIPROFLOXACIN LACTATE 400MG/40ML
500 VIAL (ML) INTRAVENOUS EVERY 12 HOURS
Refills: 0 | Status: DISCONTINUED | OUTPATIENT
Start: 2021-07-22 | End: 2021-07-22

## 2021-07-22 RX ORDER — CHOLECALCIFEROL (VITAMIN D3) 125 MCG
50000 CAPSULE ORAL
Refills: 0 | Status: DISCONTINUED | OUTPATIENT
Start: 2021-07-22 | End: 2021-07-25

## 2021-07-22 RX ORDER — LOSARTAN POTASSIUM 100 MG/1
25 TABLET, FILM COATED ORAL DAILY
Refills: 0 | Status: DISCONTINUED | OUTPATIENT
Start: 2021-07-22 | End: 2021-07-25

## 2021-07-22 RX ORDER — GLUCAGON INJECTION, SOLUTION 0.5 MG/.1ML
1 INJECTION, SOLUTION SUBCUTANEOUS ONCE
Refills: 0 | Status: DISCONTINUED | OUTPATIENT
Start: 2021-07-22 | End: 2021-07-25

## 2021-07-22 RX ORDER — ACETAMINOPHEN 500 MG
650 TABLET ORAL EVERY 6 HOURS
Refills: 0 | Status: DISCONTINUED | OUTPATIENT
Start: 2021-07-22 | End: 2021-07-23

## 2021-07-22 RX ORDER — ATORVASTATIN CALCIUM 80 MG/1
40 TABLET, FILM COATED ORAL AT BEDTIME
Refills: 0 | Status: DISCONTINUED | OUTPATIENT
Start: 2021-07-22 | End: 2021-07-25

## 2021-07-22 RX ORDER — INSULIN GLARGINE 100 [IU]/ML
20 INJECTION, SOLUTION SUBCUTANEOUS AT BEDTIME
Refills: 0 | Status: DISCONTINUED | OUTPATIENT
Start: 2021-07-22 | End: 2021-07-23

## 2021-07-22 RX ADMIN — Medication 1: at 10:35

## 2021-07-22 RX ADMIN — TAMSULOSIN HYDROCHLORIDE 0.4 MILLIGRAM(S): 0.4 CAPSULE ORAL at 22:00

## 2021-07-22 RX ADMIN — Medication 81 MILLIGRAM(S): at 11:27

## 2021-07-22 RX ADMIN — Medication 650 MILLIGRAM(S): at 04:56

## 2021-07-22 RX ADMIN — CEFTRIAXONE 100 MILLIGRAM(S): 500 INJECTION, POWDER, FOR SOLUTION INTRAMUSCULAR; INTRAVENOUS at 20:57

## 2021-07-22 RX ADMIN — CEFTRIAXONE 100 MILLIGRAM(S): 500 INJECTION, POWDER, FOR SOLUTION INTRAMUSCULAR; INTRAVENOUS at 04:57

## 2021-07-22 RX ADMIN — ATORVASTATIN CALCIUM 40 MILLIGRAM(S): 80 TABLET, FILM COATED ORAL at 22:00

## 2021-07-22 RX ADMIN — SODIUM CHLORIDE 150 MILLILITER(S): 9 INJECTION, SOLUTION INTRAVENOUS at 10:35

## 2021-07-22 RX ADMIN — LOSARTAN POTASSIUM 25 MILLIGRAM(S): 100 TABLET, FILM COATED ORAL at 17:39

## 2021-07-22 RX ADMIN — ENOXAPARIN SODIUM 40 MILLIGRAM(S): 100 INJECTION SUBCUTANEOUS at 22:01

## 2021-07-22 RX ADMIN — Medication 2: at 16:58

## 2021-07-22 RX ADMIN — INSULIN GLARGINE 20 UNIT(S): 100 INJECTION, SOLUTION SUBCUTANEOUS at 22:01

## 2021-07-22 RX ADMIN — PANTOPRAZOLE SODIUM 40 MILLIGRAM(S): 20 TABLET, DELAYED RELEASE ORAL at 17:39

## 2021-07-22 RX ADMIN — Medication 50000 UNIT(S): at 11:28

## 2021-07-22 RX ADMIN — SODIUM CHLORIDE 1000 MILLILITER(S): 9 INJECTION, SOLUTION INTRAVENOUS at 04:57

## 2021-07-22 NOTE — H&P ADULT - NSHPLABSRESULTS_GEN_ALL_CORE
13.6   13.56 )-----------( 179      ( 22 Jul 2021 05:02 )             41.1   07-22    137  |  102  |  20  ----------------------------<  241<H>  4.5   |  25  |  1.2    Ca    9.3      22 Jul 2021 05:02    TPro  6.6  /  Alb  3.7  /  TBili  0.6  /  DBili  x   /  AST  20  /  ALT  23  /  AlkPhos  83  07-22    < from: CT Abdomen and Pelvis w/ IV Cont (07.22.21 @ 08:03) >      EXAM:  CT ABDOMEN AND PELVIS IC            PROCEDURE DATE:  07/22/2021            INTERPRETATION:  CLINICAL HISTORY: Pelvic pain. Fever.    TECHNIQUE: Contiguous axial CT images were obtained from the lower chest to the pubic symphysis following administration of intravenous contrast 100 cc Omnipaque 350. Oral contrast was not administered. Reformatted images in the coronal and sagittal planes were acquired.    COMPARISON: CT abdomen pelvis 12/21/2020.    FINDINGS:    LOWER CHEST: Bibasilar atelectasis. Coronary calculations.    HEPATOBILIARY: Focal fatty infiltrated about the falciform ligament..    SPLEEN: Unremarkable.    PANCREAS: Unremarkable.    ADRENAL GLANDS: Unremarkable.    KIDNEYS: No hydronephrosis bilaterally. Left renal cysts and additional bilateral renal hypodensities too small to characterize.    ABDOMINOPELVIC NODES: No lymphadenopathy.    PELVIC ORGANS: Enlarged prostate.    PERITONEUM/MESENTERY/BOWEL: Colonic diverticulosis. No evidence of bowel obstruction ascites or free air. Appendix nonvisualized.    BONES/SOFT TISSUES: Degenerative changes of the spine. Small fat-containing umbilical hernia.    OTHER: Calcified and noncalcified atherosclerotic plaque of the abdominal aorta..    IMPRESSION:    No CT evidence of acute abdominopelvic pathology.    --- End of Report ---      < from: Xray Chest 2 Views PA/Lat (07.22.21 @ 04:16) >      EXAM:  XR CHEST PA LAT 2V            PROCEDURE DATE:  07/22/2021            INTERPRETATION:  Clinical History / Reason for exam: Chest pain    Comparison : Chest radiograph May 29, 2020.    Technique/Positioning: PA and lateral views of the chest.    Findings:    Support devices: None.    Cardiac/mediastinum/hilum: Poststernotomy, unchanged    Lung parenchyma/Pleura: No consolidation, effusion or pneumothorax.    Skeleton/soft tissues: Unremarkable.    Impression:    No radiographic evidence ofacute cardiopulmonary disease.      --- End of Report ---

## 2021-07-22 NOTE — ED ADULT NURSE NOTE - NSIMPLEMENTINTERV_GEN_ALL_ED
Implemented All Universal Safety Interventions:  [place] to call system. Call bell, personal items and telephone within reach. Instruct patient to call for assistance. Room bathroom lighting operational. Non-slip footwear when patient is off stretcher. Physically safe environment: no spills, clutter or unnecessary equipment. Stretcher in lowest position, wheels locked, appropriate side rails in place.

## 2021-07-22 NOTE — H&P ADULT - NSHPPHYSICALEXAM_GEN_ALL_CORE
Vital Signs Last 24 Hrs  T(C): 36.1 (22 Jul 2021 08:05), Max: 37.9 (22 Jul 2021 02:40)  T(F): 97 (22 Jul 2021 08:05), Max: 100.3 (22 Jul 2021 02:40)  HR: 96 (22 Jul 2021 08:05) (96 - 102)  BP: 149/84 (22 Jul 2021 08:05) (148/76 - 149/84)  BP(mean): --  RR: 18 (22 Jul 2021 08:05) (17 - 18)  SpO2: 100% (22 Jul 2021 08:05) (100% - 100%)    VITAL SIGNS: I have reviewed nursing notes and confirm.  CONSTITUTIONAL: Well-developed; well-nourished; in no acute distress.  SKIN: Skin exam is warm and dry, no acute rash.  HEAD: Normocephalic; atraumatic.  EYES: PERRL, EOM intact; conjunctivae clear, sclerae white.  ENT: No nasal discharge; airway clear. Throat clear.  CARD: S1, S2 normal; no murmurs, gallops, or rubs. Regular rate and rhythm.  RESP: No wheezes, rales or rhonchi.  ABD: Normal bowel sounds; soft; non-distended; non-tender; no hepatosplenomegaly.  EXT: Normal ROM. No clubbing, cyanosis or edema.  NEURO: Alert, oriented. Grossly unremarkable. No focal deficits.  PSYCH: Cooperative, appropriate.

## 2021-07-22 NOTE — H&P ADULT - ASSESSMENT
The patient is a 67 y/o M w/ a PMH of CAD s/p CABG 5 yrs ago on ASA, insulin-dependent DM, HTN, HLD, BPH and COVID (Dec 2020), presenting for difficulty urinating and fever, admitted for sepsis likely secondary to UTI vs acute prostatitis.    # Dysuria, urinary hesitancy x 1 week, fever, likely secondary to cystitis vs acute bacterial prostatitis  WBC 13, T 100.3 in ED, tachy to low 100s, s/p Rocephin 1 g IV x 1 in ED  Given urinary hesitancy and constitutional symptoms, acute prostatitis high on differential  Start ciprofloxacin 500 mg PO twice daily  F/u urine cx w/ gram stain, PSA, blood cx, AM CBC, BMP  Start LR @ 150 cc/hr  Tylenol 650 mg PO q6h PRN fever, pain  Vitals q4h  Admit to Medicine    # Chest pressure, headache possibly secondary to hypertensive urgency vs sepsis secondary to UTI vs acute bacterial prostatitis  # CAD s/p CABG  CXR neg for evidence of pneumonia; BP 140s/80s in ED  Trops neg x 2; f/u STAT EKG, repeat trops 11 AM  C/w losartan 25 mg once daily for now as BP better-controlled  C/w cipro and IV fluids as above  Pt on pravastatin 20 mg once daily at home; start atorvastatin 40 mg once daily inpt  C/w aspirin 81 mg once daily  Vitals q4h    # DM II  C/w Basaglar 20 U SQ qHS  Start insulin sliding scale, hold home metformin, Ozempic  Per daughter, pt takes up to 60 U Admelog daily depending on FS  CC DASH diet    # BPH  C/w tamsulosin 0.4 mg qHS    # CHG  # DVT ppx- Lovenox 40 mg SQ qHS  # GI ppx- pantoprazole 40 mg once daily  # Diet- CC DASH  # Activity- amb w/ assistance  # Dispo- acute w/ sepsis on abx, IV fluids

## 2021-07-22 NOTE — H&P ADULT - HISTORY OF PRESENT ILLNESS
The patient is a 65 y/o M w/ a PMH of CAD s/p CABG 5 yrs ago on ASA, insulin-dependent DM, HTN, HLD, BPH and COVID (Dec 2020), presenting for difficulty urinating and fever. Per daughter who translated for patient, the patient has been having progressively worsening difficulty urinating for the last week; he reports the stream of urine upon voiding has become a dribble; this is associated w/ burning toward the end of urination. He also reports generalized body pain that feels "like pins" which come most often when he feels feverish, unremitting central chest pressure x 1 week unrelated to exertion for which he has not taken anything to relieve it, as well as throbbing global headache occurring over the last week, most often in the morning and evening. The daughter reports the headache most often occurs when his BP is high; recently at home it has been 140s/100s though his diastolic normally is in the 70s. He states Tylenol would help relieve the headache somewhat. Per daughter the patient has been feeling feverish over the last week, checked his temperature this morning which was 101 F. He denies any associated chills, difficulty breathing, dizziness or lightheadedness, nausea, vomiting; she does endorse he has had watery non-bloody diarrhea alternating w/ constipation, each x 2-3 days, over the last month; he denies known sick contacts, recent travel (last was ~2 years ago).

## 2021-07-22 NOTE — ED ADULT TRIAGE NOTE - CHIEF COMPLAINT QUOTE
Pt c/o chest pressure x 4 days. Pt also complaining of urinary retention x 2 days ( hx of enlarge prostate). Pt complaining of abdominal and generalized body pain.  Pt febrile today at home. Pt took ibuprofen at midnight for temp of 101 at home. Pt c/o chest pressure x 4 days. Pt also complaining of urinary retention x 2 days ( hx of enlarge prostate). Pt complaining of abdominal and generalized body pain.  Pt febrile today at home. Pt took ibuprofen at midnight for temp of 101 at home. Pt is vaccinated. No respiratory issues. Pt c/o chest pressure x 4 days ( hx of open bypass). Pt also complaining of urinary retention x 2 days ( hx of enlarge prostate). Pt complaining of abdominal and generalized body pain.  Pt febrile today at home. Pt took ibuprofen at midnight for temp of 101 at home. Pt is vaccinated. No respiratory issues.

## 2021-07-22 NOTE — ED PROVIDER NOTE - CLINICAL SUMMARY MEDICAL DECISION MAKING FREE TEXT BOX
pt co chest pressure, fever, lower ab pain, rectal pain, urinary retention for 2 days (dribbling).  no n, v, d. no sob. had covid vax 2 months ago.  pt in nad, ent nml neck sup, ctab, rrr, ab soft, nt, nd. no edema. non focal.    labs, ekg, imaging, supportive care, fluids, reassess.

## 2021-07-22 NOTE — ED ADULT NURSE REASSESSMENT NOTE - NS ED NURSE REASSESS COMMENT FT1
Pt A&Ox4, denies pain or discomfort. Continuous cardiac monitoring maintained. Will continue to monitor.

## 2021-07-22 NOTE — ED ADULT NURSE NOTE - CHIEF COMPLAINT QUOTE
Pt c/o chest pressure x 4 days ( hx of open bypass). Pt also complaining of urinary retention x 2 days ( hx of enlarge prostate). Pt complaining of abdominal and generalized body pain.  Pt febrile today at home. Pt took ibuprofen at midnight for temp of 101 at home. Pt is vaccinated. No respiratory issues.

## 2021-07-22 NOTE — ED PROVIDER NOTE - OBJECTIVE STATEMENT
PT is a 66M with PMH of HTN, HLD, DM, CAD (w/ open bypass 5 years ago), BPH, hernia repair 10 mo ago p/w complaints of chest pressure x4 days, urinary retention x2 days with dysuria, lower abdominal pain as well as fever x1day (101 max at home at 11PM, daughter gave tylenol). PT is a 66M with PMH of HTN, HLD, DM, CAD (w/ open bypass 5 years ago), BPH, hernia repair 10 mo ago p/w complaints of chest pressure x4 days, urinary retention x2 days with dysuria, rectal pain, suprapubic pain as well as fever x1day (101 max at home at 11PM, daughter gave tylenol) and whole body myalgia, fatigue. PTs daughter at bedside. PT says chest pressure started 4 days ago, says that it is constant, diffuse, non-radiating, denies diaphoresis, n/v. PT denies cough. PTs urinary retention began 2 days ago, says that he has only been able to "dribble urine." PT denies hematuria. PT endorses suprapubic pain for two days, reports alternating diarrhea/constipation over the past month but no recent change from baseline. PT is a 66M with PMH of HTN, HLD, DM, CAD (w/ open bypass 5 years ago), BPH, hernia repair 10 mo ago p/w complaints of chest pressure x4 days, urinary retention x2 days with dysuria, rectal pain, suprapubic pain as well as fever x1day (101 max at home at 11PM, daughter gave Tylenol) and whole body myalgia, fatigue. PTs daughter at bedside. PT says chest pressure started 4 days ago, says that it is constant, diffuse, non-radiating, denies diaphoresis, n/v. PT denies SOB, cough. PTs urinary retention began 2 days ago, says that he has only been able to "dribble urine." PT endorses dysuria, denies hematuria. PT endorses suprapubic pain for two days, reports alternating diarrhea/constipation over the past month but no recent change from baseline.

## 2021-07-22 NOTE — ED PROVIDER NOTE - PHYSICAL EXAMINATION
CONSTITUTIONAL: Well-developed; well-nourished; in no acute distress. Lying comfortably in bed   SKIN: warm, dry  HEAD: Normocephalic; atraumatic.  EYES: PERRLA, EOMI, normal sclera and conjunctiva   ENT: No nasal discharge; airway clear.  NECK: Supple; non tender.  CARD:  Regular rate and rhythm.   RESP: NO inc WOB   ABD: soft ntnd, BS+, mild suprapubic dystension, scar from prior appendectomy/hernia repairs  EXT: Normal ROM. Normal peripheral pulses  LYMPH: No acute cervical adenopathy.  NEURO: Alert, oriented, grossly unremarkable. No focal weakness or sensory changes  PSYCH: Cooperative, appropriate. CONSTITUTIONAL: Well-developed; well-nourished; in no acute distress. Lying comfortably in bed   SKIN: warm, dry  HEAD: Normocephalic; atraumatic.  EYES: PERRLA, EOMI, normal sclera and conjunctiva   ENT: No nasal discharge; airway clear.  NECK: Supple; non tender.  CARD:  Regular rate and rhythm.   RESP: NO inc WOB   ABD: soft ntnd, BS+, mild suprapubic distension, scar from prior appendectomy/hernia repairs  EXT: Normal ROM. Normal peripheral pulses  LYMPH: No acute cervical adenopathy.  NEURO: Alert, oriented, grossly unremarkable. No focal weakness or sensory changes  PSYCH: Cooperative, appropriate.

## 2021-07-22 NOTE — ED PROVIDER NOTE - NS ED ROS FT
CONSTITUTIONAL - No acute distress , No weight change,   SKIN - No rash  HEMATOLOGIC - No abnormal bleeding or bruising  EYES - , No conjunctival injection, No drainage   ENT -, No sore throat, No neck pain, No rhinorrhea, No ear pain  RESPIRATORY - No shortness of breath, No cough  CARDIAC - +chest pressure, No palpitations  GI - +suprapubic pain, No nausea, No vomiting, +diarrhea, +constipation, No bright red blood per rectum or melena. No flank pain  - +dysuria, + urinary retention, No hematuria.   MUSCULOSKELETAL - No swelling, No back pain, +myalgia  NEUROLOGIC - No numbness, No focal weakness, No headache, No dizziness  ALLERGIC- no pruritis

## 2021-07-22 NOTE — ED PROVIDER NOTE - CARE PLAN
Principal Discharge DX:	UTI (urinary tract infection)  Secondary Diagnosis:	Sepsis  Secondary Diagnosis:	Urinary retention

## 2021-07-22 NOTE — H&P ADULT - ATTENDING COMMENTS
66 yr old male with hx of CAD s/p CABG 5 yrs ago, insulin-dependent DM, HTN, HLD, BPH and COVID (Dec 2020) admitted for UTI.    # Suspected Pyelonephritis  # BPH  - hemodynamically stable  - pt reports 101 yesterday; left flank tenderness  - start ceftriaxone   - c/w Flomax   - f/u blood and urine Cx  - check US KUB    # CAD s/p CABG  # DM  # HTN    # DVT PPx    # Full code 66 yr old male with hx of CAD s/p CABG 5 yrs ago, insulin-dependent DM, HTN, HLD, BPH and COVID (Dec 2020) admitted for UTI.    # Suspected Pyelonephritis  # BPH  - hemodynamically stable  - pt reports 101 yesterday; left flank tenderness  - start ceftriaxone   - c/w Flomax   - f/u blood and urine Cx  - check US KUB    # CAD s/p CABG  # HTN    # DM  - check HbA1C  - c/w sliding scale insulin     # DVT Ppx    # Full code

## 2021-07-23 LAB
A1C WITH ESTIMATED AVERAGE GLUCOSE RESULT: 7.7 % — HIGH (ref 4–5.6)
ANION GAP SERPL CALC-SCNC: 8 MMOL/L — SIGNIFICANT CHANGE UP (ref 7–14)
BASOPHILS # BLD AUTO: 0.01 K/UL — SIGNIFICANT CHANGE UP (ref 0–0.2)
BASOPHILS NFR BLD AUTO: 0.1 % — SIGNIFICANT CHANGE UP (ref 0–1)
BUN SERPL-MCNC: 11 MG/DL — SIGNIFICANT CHANGE UP (ref 10–20)
CALCIUM SERPL-MCNC: 8.8 MG/DL — SIGNIFICANT CHANGE UP (ref 8.5–10.1)
CHLORIDE SERPL-SCNC: 100 MMOL/L — SIGNIFICANT CHANGE UP (ref 98–110)
CO2 SERPL-SCNC: 26 MMOL/L — SIGNIFICANT CHANGE UP (ref 17–32)
CREAT SERPL-MCNC: 1.1 MG/DL — SIGNIFICANT CHANGE UP (ref 0.7–1.5)
CULTURE RESULTS: SIGNIFICANT CHANGE UP
EOSINOPHIL # BLD AUTO: 0.07 K/UL — SIGNIFICANT CHANGE UP (ref 0–0.7)
EOSINOPHIL NFR BLD AUTO: 0.7 % — SIGNIFICANT CHANGE UP (ref 0–8)
ESTIMATED AVERAGE GLUCOSE: 174 MG/DL — HIGH (ref 68–114)
GLUCOSE BLDC GLUCOMTR-MCNC: 182 MG/DL — HIGH (ref 70–99)
GLUCOSE BLDC GLUCOMTR-MCNC: 232 MG/DL — HIGH (ref 70–99)
GLUCOSE BLDC GLUCOMTR-MCNC: 232 MG/DL — HIGH (ref 70–99)
GLUCOSE BLDC GLUCOMTR-MCNC: 260 MG/DL — HIGH (ref 70–99)
GLUCOSE BLDC GLUCOMTR-MCNC: 270 MG/DL — HIGH (ref 70–99)
GLUCOSE SERPL-MCNC: 302 MG/DL — HIGH (ref 70–99)
HCT VFR BLD CALC: 40.5 % — LOW (ref 42–52)
HCV AB S/CO SERPL IA: 0.04 COI — SIGNIFICANT CHANGE UP
HCV AB SERPL-IMP: SIGNIFICANT CHANGE UP
HGB BLD-MCNC: 13.4 G/DL — LOW (ref 14–18)
IMM GRANULOCYTES NFR BLD AUTO: 0.2 % — SIGNIFICANT CHANGE UP (ref 0.1–0.3)
LYMPHOCYTES # BLD AUTO: 1.89 K/UL — SIGNIFICANT CHANGE UP (ref 1.2–3.4)
LYMPHOCYTES # BLD AUTO: 19.9 % — LOW (ref 20.5–51.1)
MCHC RBC-ENTMCNC: 29.6 PG — SIGNIFICANT CHANGE UP (ref 27–31)
MCHC RBC-ENTMCNC: 33.1 G/DL — SIGNIFICANT CHANGE UP (ref 32–37)
MCV RBC AUTO: 89.6 FL — SIGNIFICANT CHANGE UP (ref 80–94)
MONOCYTES # BLD AUTO: 0.7 K/UL — HIGH (ref 0.1–0.6)
MONOCYTES NFR BLD AUTO: 7.4 % — SIGNIFICANT CHANGE UP (ref 1.7–9.3)
NEUTROPHILS # BLD AUTO: 6.82 K/UL — HIGH (ref 1.4–6.5)
NEUTROPHILS NFR BLD AUTO: 71.7 % — SIGNIFICANT CHANGE UP (ref 42.2–75.2)
NRBC # BLD: 0 /100 WBCS — SIGNIFICANT CHANGE UP (ref 0–0)
PLATELET # BLD AUTO: 173 K/UL — SIGNIFICANT CHANGE UP (ref 130–400)
POTASSIUM SERPL-MCNC: 4.5 MMOL/L — SIGNIFICANT CHANGE UP (ref 3.5–5)
POTASSIUM SERPL-SCNC: 4.5 MMOL/L — SIGNIFICANT CHANGE UP (ref 3.5–5)
PSA SERPL-MCNC: 54.1 NG/ML — HIGH (ref 0–4)
RBC # BLD: 4.52 M/UL — LOW (ref 4.7–6.1)
RBC # FLD: 12.9 % — SIGNIFICANT CHANGE UP (ref 11.5–14.5)
SODIUM SERPL-SCNC: 134 MMOL/L — LOW (ref 135–146)
SPECIMEN SOURCE: SIGNIFICANT CHANGE UP
WBC # BLD: 9.51 K/UL — SIGNIFICANT CHANGE UP (ref 4.8–10.8)
WBC # FLD AUTO: 9.51 K/UL — SIGNIFICANT CHANGE UP (ref 4.8–10.8)

## 2021-07-23 PROCEDURE — 99231 SBSQ HOSP IP/OBS SF/LOW 25: CPT

## 2021-07-23 RX ORDER — ACETAMINOPHEN 500 MG
650 TABLET ORAL EVERY 6 HOURS
Refills: 0 | Status: DISCONTINUED | OUTPATIENT
Start: 2021-07-23 | End: 2021-07-25

## 2021-07-23 RX ORDER — INSULIN LISPRO 100/ML
8 VIAL (ML) SUBCUTANEOUS
Refills: 0 | Status: DISCONTINUED | OUTPATIENT
Start: 2021-07-23 | End: 2021-07-25

## 2021-07-23 RX ORDER — INSULIN GLARGINE 100 [IU]/ML
24 INJECTION, SOLUTION SUBCUTANEOUS AT BEDTIME
Refills: 0 | Status: DISCONTINUED | OUTPATIENT
Start: 2021-07-23 | End: 2021-07-25

## 2021-07-23 RX ADMIN — Medication 81 MILLIGRAM(S): at 11:56

## 2021-07-23 RX ADMIN — TAMSULOSIN HYDROCHLORIDE 0.4 MILLIGRAM(S): 0.4 CAPSULE ORAL at 21:40

## 2021-07-23 RX ADMIN — CEFTRIAXONE 100 MILLIGRAM(S): 500 INJECTION, POWDER, FOR SOLUTION INTRAMUSCULAR; INTRAVENOUS at 18:41

## 2021-07-23 RX ADMIN — Medication 8 UNIT(S): at 17:18

## 2021-07-23 RX ADMIN — Medication 650 MILLIGRAM(S): at 05:51

## 2021-07-23 RX ADMIN — ENOXAPARIN SODIUM 40 MILLIGRAM(S): 100 INJECTION SUBCUTANEOUS at 21:40

## 2021-07-23 RX ADMIN — Medication 3: at 12:44

## 2021-07-23 RX ADMIN — PANTOPRAZOLE SODIUM 40 MILLIGRAM(S): 20 TABLET, DELAYED RELEASE ORAL at 05:19

## 2021-07-23 RX ADMIN — ATORVASTATIN CALCIUM 40 MILLIGRAM(S): 80 TABLET, FILM COATED ORAL at 21:40

## 2021-07-23 RX ADMIN — SODIUM CHLORIDE 150 MILLILITER(S): 9 INJECTION, SOLUTION INTRAVENOUS at 02:58

## 2021-07-23 RX ADMIN — CHLORHEXIDINE GLUCONATE 1 APPLICATION(S): 213 SOLUTION TOPICAL at 05:19

## 2021-07-23 RX ADMIN — SODIUM CHLORIDE 150 MILLILITER(S): 9 INJECTION, SOLUTION INTRAVENOUS at 10:20

## 2021-07-23 RX ADMIN — INSULIN GLARGINE 24 UNIT(S): 100 INJECTION, SOLUTION SUBCUTANEOUS at 21:40

## 2021-07-23 RX ADMIN — Medication 2: at 07:50

## 2021-07-23 RX ADMIN — Medication 2: at 17:17

## 2021-07-23 RX ADMIN — Medication 650 MILLIGRAM(S): at 05:21

## 2021-07-23 NOTE — PROGRESS NOTE ADULT - ASSESSMENT
The patient is a 67 y/o M w/ a PMH of CAD s/p CABG 5 yrs ago on ASA, insulin-dependent DM, HTN, HLD, BPH and COVID (Dec 2020), presenting for difficulty urinating and fever, admitted for sepsis likely secondary to suspected pyelonephritis.    # Dysuria, urinary hesitancy x 1 week, fever, likely secondary to suspected pyelonephritis  - WBC 13, T 100.3 in ED, tachy to low 100s, s/p Rocephin 1 g IV x 1 in ED  - CXR neg for evidence of pneumonia;  - c/w ceftriaxone 1gm Q24H  - urine cx - normal urogenital rigoberto  - f/u blood cx  - c/w LR @ 150 cc/hr  - Tylenol 650 mg PO q6h PRN fever and pain    # CAD s/p CABG  - Trops neg x 3;  - Pt on pravastatin 20 mg once daily at home; start atorvastatin 40 mg once daily inpt  - C/w aspirin 81 mg once daily    # HTN  -C/w losartan 25 mg once daily    # DM II  - C/w Basaglar 20 U SQ qHS  - c/w insulin sliding scale, lantus and lispro  - holding home metformin, Ozempic. As per daughter, pt takes up to 60 U Admelog daily depending on FS  - CC DASH diet    # headache   - c/w tylenol    # BPH  - C/w tamsulosin 0.4 mg qHS    # CHG  # DVT ppx- Lovenox 40 mg SQ qHS  # GI ppx- pantoprazole 40 mg once daily  # Diet- CC DASH  # Activity- amb w/ assistance  # Dispo- c/w abx

## 2021-07-23 NOTE — PROGRESS NOTE ADULT - SUBJECTIVE AND OBJECTIVE BOX
SUBJECTIVE:    Patient is a 66y old Male who presents with a chief complaint of UTI, sepsis, urinary retention (2021 10:25)    Currently admitted to medicine with the primary diagnosis of UTI (urinary tract infection).  Today is hospital day 1d. This morning he is resting comfortably in bed and reports mild lower quadrant abdominal pain. No overnight events.     PAST MEDICAL & SURGICAL HISTORY  ASHD (arteriosclerotic heart disease)    Hypertension, unspecified type    Hyperlipidemia, unspecified hyperlipidemia type    Type 2 diabetes mellitus with complication, unspecified long term insulin use status    History of open heart surgery  CABG x 4, 5 years ago  ALLERGIES:  No Known Allergies    MEDICATIONS:  STANDING MEDICATIONS  aspirin  chewable 81 milliGRAM(s) Oral daily  atorvastatin 40 milliGRAM(s) Oral at bedtime  cefTRIAXone   IVPB 1000 milliGRAM(s) IV Intermittent every 24 hours  chlorhexidine 4% Liquid 1 Application(s) Topical <User Schedule>  cholecalciferol Oral Tab/Cap - Peds 66072 Unit(s) Oral every week  dextrose 40% Gel 15 Gram(s) Oral once  dextrose 5%. 1000 milliLiter(s) IV Continuous <Continuous>  dextrose 5%. 1000 milliLiter(s) IV Continuous <Continuous>  dextrose 50% Injectable 25 Gram(s) IV Push once  dextrose 50% Injectable 12.5 Gram(s) IV Push once  dextrose 50% Injectable 25 Gram(s) IV Push once  enoxaparin Injectable 40 milliGRAM(s) SubCutaneous at bedtime  glucagon  Injectable 1 milliGRAM(s) IntraMuscular once  insulin glargine Injectable (LANTUS) 24 Unit(s) SubCutaneous at bedtime  insulin lispro (ADMELOG) corrective regimen sliding scale   SubCutaneous three times a day before meals  insulin lispro Injectable (ADMELOG) 8 Unit(s) SubCutaneous three times a day before meals  lactated ringers. 1000 milliLiter(s) IV Continuous <Continuous>  losartan 25 milliGRAM(s) Oral daily  pantoprazole    Tablet 40 milliGRAM(s) Oral before breakfast  tamsulosin 0.4 milliGRAM(s) Oral at bedtime    PRN MEDICATIONS  acetaminophen   Tablet .. 650 milliGRAM(s) Oral every 6 hours PRN    VITALS:   T(F): 97  HR: 74  BP: 147/77  RR: 16  SpO2: 99%    LABS:                        13.4   9.51  )-----------( 173      ( 2021 05:43 )             40.5     07-23    134<L>  |  100  |  11  ----------------------------<  302<H>  4.5   |  26  |  1.1    Ca    8.8      2021 05:43    TPro  6.6  /  Alb  3.7  /  TBili  0.6  /  DBili  x   /  AST  20  /  ALT  23  /  AlkPhos  83  07-22      Urinalysis Basic - ( 2021 05:02 )    Color: Light Yellow / Appearance: Clear / S.020 / pH: x  Gluc: x / Ketone: Negative  / Bili: Negative / Urobili: <2 mg/dL   Blood: x / Protein: 30 mg/dL / Nitrite: Negative   Leuk Esterase: Large / RBC: 3 /HPF /  /HPF   Sq Epi: x / Non Sq Epi: 0 /HPF / Bacteria: Negative            Culture - Urine (collected 2021 05:02)  Source: Clean Catch Clean Catch (Midstream)  Final Report (2021 08:58):    <10,000 CFU/mL Normal Urogenital [place]      CARDIAC MARKERS ( 2021 11:15 )  x     / <0.01 ng/mL / x     / x     / x      CARDIAC MARKERS ( 2021 09:39 )  x     / <0.01 ng/mL / x     / x     / x      CARDIAC MARKERS ( 2021 05:02 )  x     / <0.01 ng/mL / x     / x     / x          RADIOLOGY:  < from: CT Abdomen and Pelvis w/ IV Cont (21 @ 08:03) >  IMPRESSION:    No CT evidence of acute abdominopelvic pathology.    < end of copied text >  < from: Xray Chest 2 Views PA/Lat (21 @ 04:16) >  Impression:    No radiographic evidence ofacute cardiopulmonary disease.    < end of copied text >    PHYSICAL EXAM:  GEN: No acute distress  LUNGS: Clear to auscultation bilaterally, no wheezes  HEART: Regular rate and rhythm, +s1 s2  ABD: Soft, non-distended. tender to right and left lower quadrant on deep palpation  EXT: no edema or cyanosis on LE/2+PP/BURTON/Skin Intact.   NEURO: AAOX3

## 2021-07-23 NOTE — PROGRESS NOTE ADULT - ATTENDING COMMENTS
patient seen and examined earlier today and discussed with medical resident.  patient still has some dysuria and flank pain but reports he is much improved.  Vital Signs Last 24 Hrs  T(C): 36.1 (23 Jul 2021 12:48), Max: 37.6 (22 Jul 2021 21:00)  T(F): 97 (23 Jul 2021 12:48), Max: 99.7 (22 Jul 2021 21:00)  HR: 74 (23 Jul 2021 12:48) (74 - 88)  BP: 147/77 (23 Jul 2021 12:48) (107/68 - 147/77)  RR: 16 (23 Jul 2021 12:48) (16 - 18)  SpO2: 99% (23 Jul 2021 05:31) (99% - 99%)    conj pink, no jaundice  neck no JVD. supple. no bruit  lungs clear to auscultation.  good air entry bilaterally  heart regular rhythm. no murmur or gallop heard  abd. soft nontender.  mild CVA tenderness. no tenderness over the bladder  extremities no edema.  good skin turgor    Labs:                    13.4   9.51  )-----------( 173      ( 23 Jul 2021 05:43 )             40.5   07-23    134<L>  |  100  |  11  ----------------------------<  302<H>  4.5   |  26  |  1.1    Ca    8.8      23 Jul 2021 05:43    TPro  6.6  /  Alb  3.7  /  TBili  0.6  /  DBili  x   /  AST  20  /  ALT  23  /  AlkPhos  83  07-22    CAPILLARY BLOOD GLUCOSE      POCT Blood Glucose.: 260 mg/dL (23 Jul 2021 12:19)  POCT Blood Glucose.: 270 mg/dL (23 Jul 2021 11:25)  POCT Blood Glucose.: 232 mg/dL (23 Jul 2021 07:54)  POCT Blood Glucose.: 226 mg/dL (22 Jul 2021 21:28)  POCT Blood Glucose.: 233 mg/dL (22 Jul 2021 16:54)    A: Urinary tract infection - sepsis on admission - resolving - no anatomic abnormality found on CT abdomen, urine culture negative  IDDM - not well controlled  CAD - asymptmatic  HTN - BP at target    P:  IV ceftriaxone for now - blood cultures still pending, patient improving  may need to evaluate post voic residual urine if not improving or if difficulty with urination continues or recurs  increase basal/bolus insulin and moniotr BS  monitor BP    Patient hand off  pending - improvement in UTI symptoms, then switch to oral antibioitcs - culture neg so need to treat empirically  dispo - home  discuss with patient (Telugu speaking) daughter is involved

## 2021-07-24 LAB
A1C WITH ESTIMATED AVERAGE GLUCOSE RESULT: 7.7 % — HIGH (ref 4–5.6)
ALBUMIN SERPL ELPH-MCNC: 3.2 G/DL — LOW (ref 3.5–5.2)
ALP SERPL-CCNC: 76 U/L — SIGNIFICANT CHANGE UP (ref 30–115)
ALT FLD-CCNC: 24 U/L — SIGNIFICANT CHANGE UP (ref 0–41)
ANION GAP SERPL CALC-SCNC: 9 MMOL/L — SIGNIFICANT CHANGE UP (ref 7–14)
AST SERPL-CCNC: 18 U/L — SIGNIFICANT CHANGE UP (ref 0–41)
BASOPHILS # BLD AUTO: 0.01 K/UL — SIGNIFICANT CHANGE UP (ref 0–0.2)
BASOPHILS NFR BLD AUTO: 0.2 % — SIGNIFICANT CHANGE UP (ref 0–1)
BILIRUB SERPL-MCNC: <0.2 MG/DL — SIGNIFICANT CHANGE UP (ref 0.2–1.2)
BUN SERPL-MCNC: 11 MG/DL — SIGNIFICANT CHANGE UP (ref 10–20)
CALCIUM SERPL-MCNC: 9.2 MG/DL — SIGNIFICANT CHANGE UP (ref 8.5–10.1)
CHLORIDE SERPL-SCNC: 104 MMOL/L — SIGNIFICANT CHANGE UP (ref 98–110)
CO2 SERPL-SCNC: 25 MMOL/L — SIGNIFICANT CHANGE UP (ref 17–32)
COVID-19 SPIKE DOMAIN AB INTERP: POSITIVE
COVID-19 SPIKE DOMAIN ANTIBODY RESULT: >250 U/ML — HIGH
CREAT SERPL-MCNC: 0.9 MG/DL — SIGNIFICANT CHANGE UP (ref 0.7–1.5)
EOSINOPHIL # BLD AUTO: 0.13 K/UL — SIGNIFICANT CHANGE UP (ref 0–0.7)
EOSINOPHIL NFR BLD AUTO: 2.2 % — SIGNIFICANT CHANGE UP (ref 0–8)
ESTIMATED AVERAGE GLUCOSE: 174 MG/DL — HIGH (ref 68–114)
GLUCOSE BLDC GLUCOMTR-MCNC: 143 MG/DL — HIGH (ref 70–99)
GLUCOSE BLDC GLUCOMTR-MCNC: 180 MG/DL — HIGH (ref 70–99)
GLUCOSE BLDC GLUCOMTR-MCNC: 192 MG/DL — HIGH (ref 70–99)
GLUCOSE BLDC GLUCOMTR-MCNC: 198 MG/DL — HIGH (ref 70–99)
GLUCOSE BLDC GLUCOMTR-MCNC: 244 MG/DL — HIGH (ref 70–99)
GLUCOSE SERPL-MCNC: 246 MG/DL — HIGH (ref 70–99)
HCT VFR BLD CALC: 40 % — LOW (ref 42–52)
HGB BLD-MCNC: 13.4 G/DL — LOW (ref 14–18)
HIV 1+2 AB+HIV1 P24 AG SERPL QL IA: SIGNIFICANT CHANGE UP
IMM GRANULOCYTES NFR BLD AUTO: 0.3 % — SIGNIFICANT CHANGE UP (ref 0.1–0.3)
LYMPHOCYTES # BLD AUTO: 1.93 K/UL — SIGNIFICANT CHANGE UP (ref 1.2–3.4)
LYMPHOCYTES # BLD AUTO: 32.5 % — SIGNIFICANT CHANGE UP (ref 20.5–51.1)
MAGNESIUM SERPL-MCNC: 1.6 MG/DL — LOW (ref 1.8–2.4)
MCHC RBC-ENTMCNC: 29.8 PG — SIGNIFICANT CHANGE UP (ref 27–31)
MCHC RBC-ENTMCNC: 33.5 G/DL — SIGNIFICANT CHANGE UP (ref 32–37)
MCV RBC AUTO: 89.1 FL — SIGNIFICANT CHANGE UP (ref 80–94)
MONOCYTES # BLD AUTO: 0.58 K/UL — SIGNIFICANT CHANGE UP (ref 0.1–0.6)
MONOCYTES NFR BLD AUTO: 9.8 % — HIGH (ref 1.7–9.3)
NEUTROPHILS # BLD AUTO: 3.26 K/UL — SIGNIFICANT CHANGE UP (ref 1.4–6.5)
NEUTROPHILS NFR BLD AUTO: 55 % — SIGNIFICANT CHANGE UP (ref 42.2–75.2)
NRBC # BLD: 0 /100 WBCS — SIGNIFICANT CHANGE UP (ref 0–0)
PLATELET # BLD AUTO: 195 K/UL — SIGNIFICANT CHANGE UP (ref 130–400)
POTASSIUM SERPL-MCNC: 4.5 MMOL/L — SIGNIFICANT CHANGE UP (ref 3.5–5)
POTASSIUM SERPL-SCNC: 4.5 MMOL/L — SIGNIFICANT CHANGE UP (ref 3.5–5)
PROT SERPL-MCNC: 5.8 G/DL — LOW (ref 6–8)
PSA SERPL-MCNC: 31.3 NG/ML — HIGH (ref 0–4)
RBC # BLD: 4.49 M/UL — LOW (ref 4.7–6.1)
RBC # FLD: 12.7 % — SIGNIFICANT CHANGE UP (ref 11.5–14.5)
SARS-COV-2 IGG+IGM SERPL QL IA: >250 U/ML — HIGH
SARS-COV-2 IGG+IGM SERPL QL IA: POSITIVE
SODIUM SERPL-SCNC: 138 MMOL/L — SIGNIFICANT CHANGE UP (ref 135–146)
WBC # BLD: 5.93 K/UL — SIGNIFICANT CHANGE UP (ref 4.8–10.8)
WBC # FLD AUTO: 5.93 K/UL — SIGNIFICANT CHANGE UP (ref 4.8–10.8)

## 2021-07-24 PROCEDURE — 99221 1ST HOSP IP/OBS SF/LOW 40: CPT

## 2021-07-24 PROCEDURE — 99232 SBSQ HOSP IP/OBS MODERATE 35: CPT

## 2021-07-24 RX ADMIN — SODIUM CHLORIDE 150 MILLILITER(S): 9 INJECTION, SOLUTION INTRAVENOUS at 16:15

## 2021-07-24 RX ADMIN — Medication 8 UNIT(S): at 12:41

## 2021-07-24 RX ADMIN — INSULIN GLARGINE 24 UNIT(S): 100 INJECTION, SOLUTION SUBCUTANEOUS at 21:38

## 2021-07-24 RX ADMIN — CEFTRIAXONE 100 MILLIGRAM(S): 500 INJECTION, POWDER, FOR SOLUTION INTRAMUSCULAR; INTRAVENOUS at 21:05

## 2021-07-24 RX ADMIN — ATORVASTATIN CALCIUM 40 MILLIGRAM(S): 80 TABLET, FILM COATED ORAL at 21:39

## 2021-07-24 RX ADMIN — LOSARTAN POTASSIUM 25 MILLIGRAM(S): 100 TABLET, FILM COATED ORAL at 05:14

## 2021-07-24 RX ADMIN — ENOXAPARIN SODIUM 40 MILLIGRAM(S): 100 INJECTION SUBCUTANEOUS at 21:09

## 2021-07-24 RX ADMIN — Medication 81 MILLIGRAM(S): at 11:44

## 2021-07-24 RX ADMIN — CHLORHEXIDINE GLUCONATE 1 APPLICATION(S): 213 SOLUTION TOPICAL at 05:14

## 2021-07-24 RX ADMIN — Medication 650 MILLIGRAM(S): at 04:14

## 2021-07-24 RX ADMIN — Medication 1: at 08:40

## 2021-07-24 RX ADMIN — PANTOPRAZOLE SODIUM 40 MILLIGRAM(S): 20 TABLET, DELAYED RELEASE ORAL at 05:14

## 2021-07-24 RX ADMIN — Medication 650 MILLIGRAM(S): at 09:57

## 2021-07-24 RX ADMIN — SODIUM CHLORIDE 150 MILLILITER(S): 9 INJECTION, SOLUTION INTRAVENOUS at 13:20

## 2021-07-24 RX ADMIN — Medication 650 MILLIGRAM(S): at 10:27

## 2021-07-24 RX ADMIN — TAMSULOSIN HYDROCHLORIDE 0.4 MILLIGRAM(S): 0.4 CAPSULE ORAL at 21:09

## 2021-07-24 RX ADMIN — Medication 8 UNIT(S): at 17:10

## 2021-07-24 RX ADMIN — SODIUM CHLORIDE 150 MILLILITER(S): 9 INJECTION, SOLUTION INTRAVENOUS at 01:00

## 2021-07-24 RX ADMIN — Medication 650 MILLIGRAM(S): at 03:44

## 2021-07-24 RX ADMIN — Medication 1: at 12:42

## 2021-07-24 RX ADMIN — Medication 8 UNIT(S): at 08:39

## 2021-07-24 NOTE — PROGRESS NOTE ADULT - SUBJECTIVE AND OBJECTIVE BOX
patient seen and examined earlier today  patient reports feeling much better, less dysuria and flank pain, no fever or chills  Vital Signs Last 24 Hrs  T(C): 35.6 (24 Jul 2021 08:43), Max: 36.4 (23 Jul 2021 15:52)  T(F): 96 (24 Jul 2021 08:43), Max: 97.5 (23 Jul 2021 15:52)  HR: 69 (24 Jul 2021 08:43) (69 - 78)  BP: 159/74 (24 Jul 2021 08:43) (137/75 - 159/74)  RR: 18 (24 Jul 2021 04:55) (16 - 18)  SpO2: 98% (23 Jul 2021 15:52) (98% - 98%)    conj pink, no jaundice  neck no JVD. supple. no bruit  lungs clear to auscultation.  good air entry bilaterally  heart regular rhythm. no murmur or gallop heard  abd. soft nontender.  no CVA tenderness today. no tenderness over the bladder  extremities no edema.  good skin turgor    Labs:                                        13.4   5.93  )-----------( 195      ( 24 Jul 2021 07:10 )             40.0   07-24    138  |  104  |  11  ----------------------------<  246<H>  4.5   |  25  |  0.9    Ca    9.2      24 Jul 2021 07:10  Mg     1.6     07-24    TPro  5.8<L>  /  Alb  3.2<L>  /  TBili  <0.2  /  DBili  x   /  AST  18  /  ALT  24  /  AlkPhos  76  07-24    CAPILLARY BLOOD GLUCOSE      POCT Blood Glucose.: 180 mg/dL (24 Jul 2021 11:57)  POCT Blood Glucose.: 198 mg/dL (24 Jul 2021 08:38)  POCT Blood Glucose.: 192 mg/dL (24 Jul 2021 07:30)  POCT Blood Glucose.: 182 mg/dL (23 Jul 2021 21:38)  POCT Blood Glucose.: 232 mg/dL (23 Jul 2021 16:35)  POCT Blood Glucose.: 260 mg/dL (23 Jul 2021 12:19)    Home Medications:  Actamin 325 mg oral tablet: 2 tab(s) orally every 6 hours, As needed, Temp greater or equal to 38C (100.4F) (21 Dec 2020 09:17)  Admelog 100 units/mL injectable solution:  (21 Dec 2020 08:53)  aspirin 81 mg oral tablet: 1 tab(s) orally once a day (21 Dec 2020 08:53)  Basaglar KwikPen 100 units/mL subcutaneous solution: 20 unit(s) subcutaneous once a day (at bedtime) (21 Dec 2020 08:53)  losartan 25 mg oral tablet: 1 tab(s) orally once a day (21 Dec 2020 08:53)  metFORMIN 1000 mg oral tablet: 1 tab(s) orally 2 times a day (21 Dec 2020 08:53)  omeprazole 40 mg oral delayed release capsule: 1 cap(s) orally once a day (21 Dec 2020 08:53)  Ozempic (0.25 mg or 0.5 mg dose): 1 dose(s) subcutaneous once a week (21 Dec 2020 08:53)  pravastatin 20 mg oral tablet: 1 tab(s) orally once a day (21 Dec 2020 08:53)  Vitamin D3 50,000 intl units (1250 mcg) oral capsule: 1 cap(s) orally once a week (21 Dec 2020 08:53)   patient seen and examined earlier today  patient reports feeling much better, less dysuria and flank pain, no fever or chills  Vital Signs Last 24 Hrs  T(C): 35.6 (24 Jul 2021 08:43), Max: 36.4 (23 Jul 2021 15:52)  T(F): 96 (24 Jul 2021 08:43), Max: 97.5 (23 Jul 2021 15:52)  HR: 69 (24 Jul 2021 08:43) (69 - 78)  BP: 159/74 (24 Jul 2021 08:43) (137/75 - 159/74)  RR: 18 (24 Jul 2021 04:55) (16 - 18)  SpO2: 98% (23 Jul 2021 15:52) (98% - 98%)    conj pink, no jaundice  neck no JVD. supple. no bruit  lungs clear to auscultation.  good air entry bilaterally  heart regular rhythm. no murmur or gallop heard  abd. soft nontender.  no CVA tenderness today. no tenderness over the bladder  extremities no edema.  good skin turgor    Labs:                                        13.4   5.93  )-----------( 195      ( 24 Jul 2021 07:10 )             40.0   07-24    138  |  104  |  11  ----------------------------<  246<H>  4.5   |  25  |  0.9    Ca    9.2      24 Jul 2021 07:10  Mg     1.6     07-24    TPro  5.8<L>  /  Alb  3.2<L>  /  TBili  <0.2  /  DBili  x   /  AST  18  /  ALT  24  /  AlkPhos  76  07-24    CAPILLARY BLOOD GLUCOSE      POCT Blood Glucose.: 180 mg/dL (24 Jul 2021 11:57)  POCT Blood Glucose.: 198 mg/dL (24 Jul 2021 08:38)  POCT Blood Glucose.: 192 mg/dL (24 Jul 2021 07:30)  POCT Blood Glucose.: 182 mg/dL (23 Jul 2021 21:38)  POCT Blood Glucose.: 232 mg/dL (23 Jul 2021 16:35)  POCT Blood Glucose.: 260 mg/dL (23 Jul 2021 12:19)    PSA 31    Home Medications:  Actamin 325 mg oral tablet: 2 tab(s) orally every 6 hours, As needed, Temp greater or equal to 38C (100.4F) (21 Dec 2020 09:17)  Admelog 100 units/mL injectable solution:  (21 Dec 2020 08:53)  aspirin 81 mg oral tablet: 1 tab(s) orally once a day (21 Dec 2020 08:53)  Basaglar KwikPen 100 units/mL subcutaneous solution: 20 unit(s) subcutaneous once a day (at bedtime) (21 Dec 2020 08:53)  losartan 25 mg oral tablet: 1 tab(s) orally once a day (21 Dec 2020 08:53)  metFORMIN 1000 mg oral tablet: 1 tab(s) orally 2 times a day (21 Dec 2020 08:53)  omeprazole 40 mg oral delayed release capsule: 1 cap(s) orally once a day (21 Dec 2020 08:53)  Ozempic (0.25 mg or 0.5 mg dose): 1 dose(s) subcutaneous once a week (21 Dec 2020 08:53)  pravastatin 20 mg oral tablet: 1 tab(s) orally once a day (21 Dec 2020 08:53)  Vitamin D3 50,000 intl units (1250 mcg) oral capsule: 1 cap(s) orally once a week (21 Dec 2020 08:53)

## 2021-07-24 NOTE — CONSULT NOTE ADULT - ASSESSMENT
67 y/o male with levated PSA of 31 and UTI  - UA, UCx, Urine cytology  - f/u as o/p with  for prostate biopsy  - Prostate MRI as o/p  - Will d/w attending

## 2021-07-24 NOTE — PROGRESS NOTE ADULT - ASSESSMENT
A: Urinary tract infection - sepsis on admission - resolving - no anatomic abnormality found on CT abdomen, urine culture negative  IDDM - improved but sitll not well controlled  CAD - asymptmatic  HTN - BP at target    P:  IV ceftriaxone for now - blood cultures still pending, patient improving  may need to evaluate post void residual urine if not improving or if difficulty with urination continues or recurs  will further increase basal/bolus insulin and moniotr BS  monitor BP    Patient hand off  pending - improvement in UTI symptoms, may switch to oral antibioitcs tomorrow which is Day #4 IV antibiotics  - culture neg so need to treat empirically  dispo - home  discuss with patient (Pashto speaking) daughter is involved .       A: Urinary tract infection - sepsis on admission - resolving - no anatomic abnormality found on CT abdomen, urine culture negative  IDDM - improved but sitll not well controlled  CAD - asymptmatic  HTN - BP at target    P:  IV ceftriaxone for now - blood cultures still pending, patient improving  may need to evaluate post void residual urine if not improving or if difficulty with urination continues or recurs  will further increase basal/bolus insulin and moniotr BS  monitor BP    Patient hand off  pending - improvement in UTI symptoms, may switch to oral antibioitcs tomorrow which is Day #4 IV antibiotics  - culture neg so need to treat empirically  dispo - home  discuss with patient (Pashto speaking) daughter Dyan is involved .)       A: Urinary tract infection - sepsis on admission - resolving - no anatomic abnormality found on CT abdomen, urine culture negative  PSA elevated (31) - need to r/o prostate CA vs. infection  IDDM - improved but sitll not well controlled  CAD - asymptmatic  HTN - BP at target    P:  IV ceftriaxone for now - blood cultures still pending, patient improving  may need to evaluate post void residual urine if not improving or if difficulty with urination continues or recurs  will further increase basal/bolus insulin and moniotr BS  monitor BP  urology consultation regarding elevated PSA      Patient hand off  pending - improvement in UTI symptoms, may switch to oral antibioitcs tomorrow which is Day #4 IV antibiotics  - culture neg so need to treat empirically  dispo - home  discuss with patient (Divehi speaking) daughter Dyan is very involved .)

## 2021-07-24 NOTE — CONSULT NOTE ADULT - SUBJECTIVE AND OBJECTIVE BOX
HPI:  The patient is a 67 y/o M w/ a PMH of CAD s/p CABG 5 yrs ago on ASA, insulin-dependent DM, HTN, HLD, BPH and COVID (Dec 2020), presenting for difficulty urinating and fever. Per daughter who translated for patient, the patient has been having progressively worsening difficulty urinating for the last week; he reports the stream of urine upon voiding has become a dribble; this is associated w/ burning toward the end of urination. In addition hes reporting b/l hip pain which is progressively worsening over the past 2 months. Per daughter the patient has been feeling feverish over the last week, checked his temperature this morning which was 101 F. He denies any associated chills, difficulty breathing, dizziness or lightheadedness, nausea, vomiting; she does endorse he has had watery non-bloody diarrhea alternating w/ constipation, each x 2-3 days, over the last month; he denies known sick contacts, recent travel (last was ~2 years ago). (22 Jul 2021 10:25)      PAST MEDICAL & SURGICAL HISTORY:  ASHD (arteriosclerotic heart disease)    Hypertension, unspecified type    Hyperlipidemia, unspecified hyperlipidemia type    Type 2 diabetes mellitus with complication, unspecified long term insulin use status    History of open heart surgery  CABG x 4, 5 years ago    REVIEW OF SYSTEMS   [x] A ten-point review of systems was otherwise negative except as noted.    MEDICATIONS  (STANDING):  aspirin  chewable 81 milliGRAM(s) Oral daily  atorvastatin 40 milliGRAM(s) Oral at bedtime  cefTRIAXone   IVPB 1000 milliGRAM(s) IV Intermittent every 24 hours  chlorhexidine 4% Liquid 1 Application(s) Topical <User Schedule>  cholecalciferol Oral Tab/Cap - Peds 93355 Unit(s) Oral every week  dextrose 40% Gel 15 Gram(s) Oral once  dextrose 5%. 1000 milliLiter(s) (50 mL/Hr) IV Continuous <Continuous>  dextrose 5%. 1000 milliLiter(s) (100 mL/Hr) IV Continuous <Continuous>  dextrose 50% Injectable 25 Gram(s) IV Push once  dextrose 50% Injectable 12.5 Gram(s) IV Push once  dextrose 50% Injectable 25 Gram(s) IV Push once  enoxaparin Injectable 40 milliGRAM(s) SubCutaneous at bedtime  glucagon  Injectable 1 milliGRAM(s) IntraMuscular once  insulin glargine Injectable (LANTUS) 24 Unit(s) SubCutaneous at bedtime  insulin lispro (ADMELOG) corrective regimen sliding scale   SubCutaneous three times a day before meals  insulin lispro Injectable (ADMELOG) 8 Unit(s) SubCutaneous three times a day before meals  lactated ringers. 1000 milliLiter(s) (150 mL/Hr) IV Continuous <Continuous>  losartan 25 milliGRAM(s) Oral daily  pantoprazole    Tablet 40 milliGRAM(s) Oral before breakfast  tamsulosin 0.4 milliGRAM(s) Oral at bedtime    MEDICATIONS  (PRN):  acetaminophen   Tablet .. 650 milliGRAM(s) Oral every 6 hours PRN Temp greater or equal to 38C (100.4F), Mild Pain (1 - 3), Moderate Pain (4 - 6)    Allergies    No Known Allergies    Intolerances    SOCIAL HISTORY: No illicit drug use    FAMILY HISTORY:  FH: heart disease    Vital Signs Last 24 Hrs  T(C): 35.6 (24 Jul 2021 12:40), Max: 36.4 (23 Jul 2021 15:52)  T(F): 96.1 (24 Jul 2021 12:40), Max: 97.5 (23 Jul 2021 15:52)  HR: 59 (24 Jul 2021 12:40) (59 - 78)  BP: 142/69 (24 Jul 2021 12:40) (137/75 - 159/74)  BP(mean): --  RR: 16 (24 Jul 2021 12:40) (16 - 18)  SpO2: 98% (23 Jul 2021 15:52) (98% - 98%)    PHYSICAL EXAM:  Constitutional: NAD, well-developed, awake/alert  HEENT: EOMI  Neck: no pain  Back: No CVA tenderness B/L  Respiratory: No accessory respiratory muscle use  Abd: Soft, NT/ND, bladder non palpable, no suprapubic tenderness  Extremities: no edema  Neurological: A/O x 3  Psychiatric: Normal mood, normal affect  Skin: No obvious rashes    I&O's Summary    23 Jul 2021 07:01  -  24 Jul 2021 07:00  --------------------------------------------------------  IN: 0 mL / OUT: 800 mL / NET: -800 mL    24 Jul 2021 07:01  -  24 Jul 2021 14:55  --------------------------------------------------------  IN: 0 mL / OUT: 1150 mL / NET: -1150 mL    LABS:                        13.4   5.93  )-----------( 195      ( 24 Jul 2021 07:10 )             40.0     07-24    138  |  104  |  11  ----------------------------<  246<H>  4.5   |  25  |  0.9    Ca    9.2      24 Jul 2021 07:10  Mg     1.6     07-24    TPro  5.8<L>  /  Alb  3.2<L>  /  TBili  <0.2  /  DBili  x   /  AST  18  /  ALT  24  /  AlkPhos  76  07-24    RADIOLOGY & ADDITIONAL STUDIES:  < from: CT Abdomen and Pelvis w/ IV Cont (07.22.21 @ 08:03) >    EXAM:  CT ABDOMEN AND PELVIS IC            PROCEDURE DATE:  07/22/2021            INTERPRETATION:  CLINICAL HISTORY: Pelvic pain. Fever.    TECHNIQUE: Contiguous axial CT images were obtained from the lower chest to the pubic symphysis following administration of intravenous contrast 100 cc Omnipaque 350. Oral contrast was not administered. Reformatted images in the coronal and sagittal planes were acquired.    COMPARISON: CT abdomen pelvis 12/21/2020.    FINDINGS:    LOWER CHEST: Bibasilar atelectasis. Coronary calculations.    HEPATOBILIARY: Focal fatty infiltrated about the falciform ligament..    SPLEEN: Unremarkable.    PANCREAS: Unremarkable.    ADRENAL GLANDS: Unremarkable.    KIDNEYS: No hydronephrosis bilaterally. Left renal cysts and additional bilateral renal hypodensities too small to characterize.    ABDOMINOPELVIC NODES: No lymphadenopathy.    PELVIC ORGANS: Enlarged prostate.    PERITONEUM/MESENTERY/BOWEL: Colonic diverticulosis. No evidence of bowel obstruction ascites or free air. Appendix nonvisualized.    BONES/SOFT TISSUES: Degenerative changes of the spine. Small fat-containing umbilical hernia.    OTHER: Calcified and noncalcified atherosclerotic plaque of the abdominal aorta..    IMPRESSION:    No CT evidence of acute abdominopelvic pathology.    --- End of Report ---            JEOVANNY ARITA DO; Resident Radiologist  This document has been electronically signed.  GAGANDEEP HOFFMAN MD; Attending Radiologist  This document has been electronically signed. Jul 22 2021  8:55AM    < end of copied text >

## 2021-07-24 NOTE — CONSULT NOTE ADULT - TIME BILLING
as per PA note    67 y/o male with levated PSA of 31 and UTI  - UA, UCx, Urine cytology  - f/u as o/p with  for prostate biopsy  - Prostate MRI as o/p      the elevation is related to his UTI and will be re-evaluated  prior to above measures

## 2021-07-25 ENCOUNTER — TRANSCRIPTION ENCOUNTER (OUTPATIENT)
Age: 66
End: 2021-07-25

## 2021-07-25 VITALS
RESPIRATION RATE: 15 BRPM | SYSTOLIC BLOOD PRESSURE: 151 MMHG | HEART RATE: 69 BPM | TEMPERATURE: 97 F | DIASTOLIC BLOOD PRESSURE: 73 MMHG

## 2021-07-25 DIAGNOSIS — N40.0 BENIGN PROSTATIC HYPERPLASIA WITHOUT LOWER URINARY TRACT SYMPTOMS: ICD-10-CM

## 2021-07-25 LAB
ALBUMIN SERPL ELPH-MCNC: 3.4 G/DL — LOW (ref 3.5–5.2)
ALP SERPL-CCNC: 79 U/L — SIGNIFICANT CHANGE UP (ref 30–115)
ALT FLD-CCNC: 29 U/L — SIGNIFICANT CHANGE UP (ref 0–41)
ANION GAP SERPL CALC-SCNC: 10 MMOL/L — SIGNIFICANT CHANGE UP (ref 7–14)
AST SERPL-CCNC: 18 U/L — SIGNIFICANT CHANGE UP (ref 0–41)
BASOPHILS # BLD AUTO: 0.01 K/UL — SIGNIFICANT CHANGE UP (ref 0–0.2)
BASOPHILS NFR BLD AUTO: 0.2 % — SIGNIFICANT CHANGE UP (ref 0–1)
BILIRUB SERPL-MCNC: <0.2 MG/DL — SIGNIFICANT CHANGE UP (ref 0.2–1.2)
BUN SERPL-MCNC: 9 MG/DL — LOW (ref 10–20)
CALCIUM SERPL-MCNC: 9.4 MG/DL — SIGNIFICANT CHANGE UP (ref 8.5–10.1)
CHLORIDE SERPL-SCNC: 102 MMOL/L — SIGNIFICANT CHANGE UP (ref 98–110)
CO2 SERPL-SCNC: 27 MMOL/L — SIGNIFICANT CHANGE UP (ref 17–32)
CREAT SERPL-MCNC: 1.1 MG/DL — SIGNIFICANT CHANGE UP (ref 0.7–1.5)
EOSINOPHIL # BLD AUTO: 0.1 K/UL — SIGNIFICANT CHANGE UP (ref 0–0.7)
EOSINOPHIL NFR BLD AUTO: 1.7 % — SIGNIFICANT CHANGE UP (ref 0–8)
GLUCOSE BLDC GLUCOMTR-MCNC: 177 MG/DL — HIGH (ref 70–99)
GLUCOSE BLDC GLUCOMTR-MCNC: 197 MG/DL — HIGH (ref 70–99)
GLUCOSE BLDC GLUCOMTR-MCNC: 221 MG/DL — HIGH (ref 70–99)
GLUCOSE SERPL-MCNC: 248 MG/DL — HIGH (ref 70–99)
HCT VFR BLD CALC: 40.6 % — LOW (ref 42–52)
HGB BLD-MCNC: 13.6 G/DL — LOW (ref 14–18)
IMM GRANULOCYTES NFR BLD AUTO: 0.5 % — HIGH (ref 0.1–0.3)
LYMPHOCYTES # BLD AUTO: 1.91 K/UL — SIGNIFICANT CHANGE UP (ref 1.2–3.4)
LYMPHOCYTES # BLD AUTO: 33.2 % — SIGNIFICANT CHANGE UP (ref 20.5–51.1)
MAGNESIUM SERPL-MCNC: 1.6 MG/DL — LOW (ref 1.8–2.4)
MCHC RBC-ENTMCNC: 29.5 PG — SIGNIFICANT CHANGE UP (ref 27–31)
MCHC RBC-ENTMCNC: 33.5 G/DL — SIGNIFICANT CHANGE UP (ref 32–37)
MCV RBC AUTO: 88.1 FL — SIGNIFICANT CHANGE UP (ref 80–94)
MONOCYTES # BLD AUTO: 0.43 K/UL — SIGNIFICANT CHANGE UP (ref 0.1–0.6)
MONOCYTES NFR BLD AUTO: 7.5 % — SIGNIFICANT CHANGE UP (ref 1.7–9.3)
NEUTROPHILS # BLD AUTO: 3.27 K/UL — SIGNIFICANT CHANGE UP (ref 1.4–6.5)
NEUTROPHILS NFR BLD AUTO: 56.9 % — SIGNIFICANT CHANGE UP (ref 42.2–75.2)
NRBC # BLD: 0 /100 WBCS — SIGNIFICANT CHANGE UP (ref 0–0)
PLATELET # BLD AUTO: 203 K/UL — SIGNIFICANT CHANGE UP (ref 130–400)
POTASSIUM SERPL-MCNC: 4.2 MMOL/L — SIGNIFICANT CHANGE UP (ref 3.5–5)
POTASSIUM SERPL-SCNC: 4.2 MMOL/L — SIGNIFICANT CHANGE UP (ref 3.5–5)
PROT SERPL-MCNC: 6.2 G/DL — SIGNIFICANT CHANGE UP (ref 6–8)
RBC # BLD: 4.61 M/UL — LOW (ref 4.7–6.1)
RBC # FLD: 12.7 % — SIGNIFICANT CHANGE UP (ref 11.5–14.5)
SODIUM SERPL-SCNC: 139 MMOL/L — SIGNIFICANT CHANGE UP (ref 135–146)
WBC # BLD: 5.75 K/UL — SIGNIFICANT CHANGE UP (ref 4.8–10.8)
WBC # FLD AUTO: 5.75 K/UL — SIGNIFICANT CHANGE UP (ref 4.8–10.8)

## 2021-07-25 PROCEDURE — 99238 HOSP IP/OBS DSCHRG MGMT 30/<: CPT

## 2021-07-25 RX ORDER — CEFPODOXIME PROXETIL 100 MG
1 TABLET ORAL
Qty: 14 | Refills: 0
Start: 2021-07-25 | End: 2021-07-31

## 2021-07-25 RX ADMIN — Medication 2: at 17:44

## 2021-07-25 RX ADMIN — Medication 8 UNIT(S): at 08:17

## 2021-07-25 RX ADMIN — PANTOPRAZOLE SODIUM 40 MILLIGRAM(S): 20 TABLET, DELAYED RELEASE ORAL at 06:06

## 2021-07-25 RX ADMIN — Medication 1: at 12:35

## 2021-07-25 RX ADMIN — Medication 8 UNIT(S): at 12:35

## 2021-07-25 RX ADMIN — Medication 81 MILLIGRAM(S): at 12:17

## 2021-07-25 RX ADMIN — Medication 8 UNIT(S): at 17:43

## 2021-07-25 RX ADMIN — SODIUM CHLORIDE 150 MILLILITER(S): 9 INJECTION, SOLUTION INTRAVENOUS at 08:17

## 2021-07-25 RX ADMIN — Medication 1: at 08:18

## 2021-07-25 RX ADMIN — Medication 650 MILLIGRAM(S): at 18:58

## 2021-07-25 RX ADMIN — LOSARTAN POTASSIUM 25 MILLIGRAM(S): 100 TABLET, FILM COATED ORAL at 05:43

## 2021-07-25 RX ADMIN — CHLORHEXIDINE GLUCONATE 1 APPLICATION(S): 213 SOLUTION TOPICAL at 05:43

## 2021-07-25 RX ADMIN — Medication 650 MILLIGRAM(S): at 18:28

## 2021-07-25 NOTE — DISCHARGE NOTE NURSING/CASE MANAGEMENT/SOCIAL WORK - PATIENT PORTAL LINK FT
You can access the FollowMyHealth Patient Portal offered by [facility] by registering at the following website: [email or web address]. By joining [employer]’s FollowMyHealth portal, you will also be able to view your health information using other applications (apps) compatible with our system.

## 2021-07-25 NOTE — DISCHARGE NOTE PROVIDER - NSDCMRMEDTOKEN_GEN_ALL_CORE_FT
Actamin 325 mg oral tablet: 2 tab(s) orally every 6 hours, As needed, Temp greater or equal to 38C (100.4F)  Admelog 100 units/mL injectable solution:   aspirin 81 mg oral tablet: 1 tab(s) orally once a day  Basaglar KwikPen 100 units/mL subcutaneous solution: 20 unit(s) subcutaneous once a day (at bedtime)  cefpodoxime 200 mg oral tablet: 1 tab(s) orally 2 times a day   losartan 25 mg oral tablet: 1 tab(s) orally once a day  metFORMIN 1000 mg oral tablet: 1 tab(s) orally 2 times a day  omeprazole 40 mg oral delayed release capsule: 1 cap(s) orally once a day  Ozempic (0.25 mg or 0.5 mg dose): 1 dose(s) subcutaneous once a week  pravastatin 20 mg oral tablet: 1 tab(s) orally once a day  tamsulosin 0.4 mg oral capsule: 1 cap(s) orally once a day (at bedtime)  Vitamin D3 50,000 intl units (1250 mcg) oral capsule: 1 cap(s) orally once a week   Actamin 325 mg oral tablet: 2 tab(s) orally every 6 hours, As needed, Temp greater or equal to 38C (100.4F)  Admelog 100 units/mL injectable solution:   aspirin 81 mg oral tablet: 1 tab(s) orally once a day  Basaglar KwikPen 100 units/mL subcutaneous solution: 20 unit(s) subcutaneous once a day (at bedtime)  cefpodoxime 200 mg oral tablet: 1 tab(s) orally 2 times a day   cefpodoxime 200 mg oral tablet: 1 tab(s) orally 2 times a day   losartan 25 mg oral tablet: 1 tab(s) orally once a day  metFORMIN 1000 mg oral tablet: 1 tab(s) orally 2 times a day  omeprazole 40 mg oral delayed release capsule: 1 cap(s) orally once a day  Ozempic (0.25 mg or 0.5 mg dose): 1 dose(s) subcutaneous once a week  pravastatin 20 mg oral tablet: 1 tab(s) orally once a day  tamsulosin 0.4 mg oral capsule: 1 cap(s) orally once a day (at bedtime)  Vitamin D3 50,000 intl units (1250 mcg) oral capsule: 1 cap(s) orally once a week

## 2021-07-25 NOTE — DISCHARGE NOTE PROVIDER - NSDCCPCAREPLAN_GEN_ALL_CORE_FT
PRINCIPAL DISCHARGE DIAGNOSIS  Diagnosis: UTI (urinary tract infection)  Assessment and Plan of Treatment:       SECONDARY DISCHARGE DIAGNOSES  Diagnosis: Sepsis  Assessment and Plan of Treatment:     Diagnosis: Urinary retention  Assessment and Plan of Treatment:

## 2021-07-25 NOTE — DISCHARGE NOTE PROVIDER - HOSPITAL COURSE
patient admitted with CC fever, dysuria.  had sepsis on admission with fever, elevated wbc count and tachycardia secondary to UTI.  patient improved with IV antibiotics.  imaging did not reveal any anatomic abnormality.  urine and blood cultures were NG.  patient found to have elevated PSA (31) and urology consult done.  patient advised to have outpatient f/u.  daughter and patient informed.  Dr. Neel Marte is their urologist and they will make appointment with him. ASAp.

## 2021-07-25 NOTE — DISCHARGE NOTE PROVIDER - CARE PROVIDER_API CALL
Matias Marte)  Urology  [address] [place] [address] [address] [place] [id number]  Phone: [phone]  Fax: [phone]  Follow Up Time:

## 2021-07-27 ENCOUNTER — LABORATORY RESULT (OUTPATIENT)
Age: 66
End: 2021-07-27

## 2021-07-27 ENCOUNTER — APPOINTMENT (OUTPATIENT)
Dept: UROLOGY | Facility: CLINIC | Age: 66
End: 2021-07-27
Payer: MEDICARE

## 2021-07-27 DIAGNOSIS — N32.81 OVERACTIVE BLADDER: ICD-10-CM

## 2021-07-27 DIAGNOSIS — R39.198 OTHER DIFFICULTIES WITH MICTURITION: ICD-10-CM

## 2021-07-27 DIAGNOSIS — R30.0 DYSURIA: ICD-10-CM

## 2021-07-27 LAB
CULTURE RESULTS: SIGNIFICANT CHANGE UP
CULTURE RESULTS: SIGNIFICANT CHANGE UP
SPECIMEN SOURCE: SIGNIFICANT CHANGE UP
SPECIMEN SOURCE: SIGNIFICANT CHANGE UP

## 2021-07-27 PROCEDURE — 99214 OFFICE O/P EST MOD 30 MIN: CPT

## 2021-07-27 RX ORDER — TAMSULOSIN HYDROCHLORIDE 0.4 MG/1
0.4 CAPSULE ORAL
Qty: 30 | Refills: 3 | Status: ACTIVE | COMMUNITY
Start: 2021-07-27 | End: 1900-01-01

## 2021-07-27 NOTE — HISTORY OF PRESENT ILLNESS
[FreeTextEntry1] : JAVI JUDD is a 66 year old male who presents for consultation for elevated PSA, UTI, fall some lower urinary tract symptoms.\par \par On 7/22/2021 patient presented to the emergency room complaining of progressively worsening urinary symptoms with dysuria, fever, and flank pain.  Temperature is 101 °F and CT scan demonstrated no evidence of acute abdominal pelvic pathology.  Images reviewed demonstrated some stranding around the kidneys bilaterally.\par \par Patient was admitted and started on IV antibiotics.  His symptoms resolved.  PSA was obtained during hospital stay which demonstrated a value of 54.1 on 7/22/2021.  A day after, on antibiotics, PSA decreased to 31.3\par \par Patient was discharged on Cefpodoxime, which she is currently taking.  He states his symptoms have now completely resolved and he no longer has dysuria, flank pain, abdominal or suprapubic discomfort.\par \par Patient is currently complaining of difficulty during voiding with frequency/urgency and poor volume voiding.  He has several episodes of nocturia.\par \par Although discharge states he is on tamsulosin his son states he has not taken this medication and is not currently on it.\par Denies gross hematuria, dysuria or associated symptoms. Denies flank pain.\par \par Patient's son acting as , declined  services.\par \par Sonographic postvoid residual 0 cc 7/27/21\par \par Denies  PMH including previous kidney stones, recurrent UTIs.\par Family History: No  malignancies\par \par Lab work from 7/25/2021:\par CBC demonstrates mild anemia\par BMP demonstrates a creatinine of 1.1 with an estimated GFR of 81\par Patient has elevated glucose consistent with history of uncontrolled diabetes\par Urine demonstrates pyuria however, culture is negative, likely reflecting early antibiotic use prior to culture\par  \par

## 2021-07-27 NOTE — PHYSICAL EXAM
[General Appearance - Well Developed] : well developed [General Appearance - Well Nourished] : well nourished [Normal Appearance] : normal appearance [Well Groomed] : well groomed [General Appearance - In No Acute Distress] : no acute distress [Abdomen Soft] : soft [Abdomen Tenderness] : non-tender [Costovertebral Angle Tenderness] : no ~M costovertebral angle tenderness [] : no respiratory distress [Respiration, Rhythm And Depth] : normal respiratory rhythm and effort [Exaggerated Use Of Accessory Muscles For Inspiration] : no accessory muscle use [Oriented To Time, Place, And Person] : oriented to person, place, and time [Affect] : the affect was normal [Mood] : the mood was normal [Not Anxious] : not anxious [Normal Station and Gait] : the gait and station were normal for the patient's age [No Focal Deficits] : no focal deficits [FreeTextEntry1] : Surgical scars consistent with history

## 2021-07-27 NOTE — ASSESSMENT
[FreeTextEntry1] : JAVI JUDD is a 66 year old male who presents for consultation for elevated PSA, UTI, lower urinary tract symptoms.  PSA significantly elevated and obtained during time of acute infection. \par \par  Patient on Cefpodoxime and experiencing continued lower urinary tract symptoms.  States he is not on tamsulosin despite hospital reconciliation.  PVR 0 cc today\par \par -Start tamsulosin p.o. daily.  All usage, dosage, mechanism of action, and adverse events fully reviewed\par -UA C&S\par -Follow-up 3 months PSA testing to track and trend.   if PSA does not decline consider MRI

## 2021-07-29 ENCOUNTER — NON-APPOINTMENT (OUTPATIENT)
Age: 66
End: 2021-07-29

## 2021-07-29 DIAGNOSIS — Z79.82 LONG TERM (CURRENT) USE OF ASPIRIN: ICD-10-CM

## 2021-07-29 DIAGNOSIS — I10 ESSENTIAL (PRIMARY) HYPERTENSION: ICD-10-CM

## 2021-07-29 DIAGNOSIS — R39.11 HESITANCY OF MICTURITION: ICD-10-CM

## 2021-07-29 DIAGNOSIS — N39.0 URINARY TRACT INFECTION, SITE NOT SPECIFIED: ICD-10-CM

## 2021-07-29 DIAGNOSIS — I25.10 ATHEROSCLEROTIC HEART DISEASE OF NATIVE CORONARY ARTERY WITHOUT ANGINA PECTORIS: ICD-10-CM

## 2021-07-29 DIAGNOSIS — Z86.16 PERSONAL HISTORY OF COVID-19: ICD-10-CM

## 2021-07-29 DIAGNOSIS — N40.1 BENIGN PROSTATIC HYPERPLASIA WITH LOWER URINARY TRACT SYMPTOMS: ICD-10-CM

## 2021-07-29 DIAGNOSIS — Z79.4 LONG TERM (CURRENT) USE OF INSULIN: ICD-10-CM

## 2021-07-29 DIAGNOSIS — A41.9 SEPSIS, UNSPECIFIED ORGANISM: ICD-10-CM

## 2021-07-29 DIAGNOSIS — E78.5 HYPERLIPIDEMIA, UNSPECIFIED: ICD-10-CM

## 2021-07-29 DIAGNOSIS — Z95.1 PRESENCE OF AORTOCORONARY BYPASS GRAFT: ICD-10-CM

## 2021-07-29 DIAGNOSIS — E11.9 TYPE 2 DIABETES MELLITUS WITHOUT COMPLICATIONS: ICD-10-CM

## 2021-07-29 DIAGNOSIS — R33.8 OTHER RETENTION OF URINE: ICD-10-CM

## 2021-07-29 LAB
APPEARANCE: CLEAR
BACTERIA UR CULT: NORMAL
BILIRUBIN URINE: NEGATIVE
BLOOD URINE: NEGATIVE
COLOR: YELLOW
GLUCOSE QUALITATIVE U: NEGATIVE
KETONES URINE: NEGATIVE
LEUKOCYTE ESTERASE URINE: NEGATIVE
NITRITE URINE: NEGATIVE
PH URINE: 5.5
PROTEIN URINE: ABNORMAL
SPECIFIC GRAVITY URINE: 1.03
UROBILINOGEN URINE: NORMAL

## 2021-08-02 ENCOUNTER — EMERGENCY (EMERGENCY)
Facility: HOSPITAL | Age: 66
LOS: 0 days | Discharge: HOME | End: 2021-08-03
Attending: EMERGENCY MEDICINE | Admitting: EMERGENCY MEDICINE
Payer: MEDICARE

## 2021-08-02 VITALS
HEIGHT: 68 IN | OXYGEN SATURATION: 99 % | DIASTOLIC BLOOD PRESSURE: 67 MMHG | SYSTOLIC BLOOD PRESSURE: 116 MMHG | WEIGHT: 169.98 LBS | HEART RATE: 92 BPM | TEMPERATURE: 98 F | RESPIRATION RATE: 19 BRPM

## 2021-08-02 DIAGNOSIS — M25.562 PAIN IN LEFT KNEE: ICD-10-CM

## 2021-08-02 DIAGNOSIS — I25.10 ATHEROSCLEROTIC HEART DISEASE OF NATIVE CORONARY ARTERY WITHOUT ANGINA PECTORIS: ICD-10-CM

## 2021-08-02 DIAGNOSIS — Z98.890 OTHER SPECIFIED POSTPROCEDURAL STATES: Chronic | ICD-10-CM

## 2021-08-02 DIAGNOSIS — Z79.02 LONG TERM (CURRENT) USE OF ANTITHROMBOTICS/ANTIPLATELETS: ICD-10-CM

## 2021-08-02 DIAGNOSIS — Z79.899 OTHER LONG TERM (CURRENT) DRUG THERAPY: ICD-10-CM

## 2021-08-02 DIAGNOSIS — Z87.440 PERSONAL HISTORY OF URINARY (TRACT) INFECTIONS: ICD-10-CM

## 2021-08-02 DIAGNOSIS — Z79.82 LONG TERM (CURRENT) USE OF ASPIRIN: ICD-10-CM

## 2021-08-02 DIAGNOSIS — Z79.84 LONG TERM (CURRENT) USE OF ORAL HYPOGLYCEMIC DRUGS: ICD-10-CM

## 2021-08-02 DIAGNOSIS — M71.22 SYNOVIAL CYST OF POPLITEAL SPACE [BAKER], LEFT KNEE: ICD-10-CM

## 2021-08-02 DIAGNOSIS — I10 ESSENTIAL (PRIMARY) HYPERTENSION: ICD-10-CM

## 2021-08-02 DIAGNOSIS — E11.9 TYPE 2 DIABETES MELLITUS WITHOUT COMPLICATIONS: ICD-10-CM

## 2021-08-02 DIAGNOSIS — E78.5 HYPERLIPIDEMIA, UNSPECIFIED: ICD-10-CM

## 2021-08-02 PROCEDURE — 99284 EMERGENCY DEPT VISIT MOD MDM: CPT

## 2021-08-02 PROCEDURE — 93970 EXTREMITY STUDY: CPT | Mod: 26

## 2021-08-02 RX ORDER — ACETAMINOPHEN 500 MG
975 TABLET ORAL ONCE
Refills: 0 | Status: COMPLETED | OUTPATIENT
Start: 2021-08-02 | End: 2021-08-02

## 2021-08-02 RX ADMIN — Medication 975 MILLIGRAM(S): at 22:26

## 2021-08-02 NOTE — ED ADULT NURSE NOTE - NS ED NURSE LEVEL OF CONSCIOUSNESS AFFECT
Calm Double O-Z Plasty Text: The defect edges were debeveled with a #15 scalpel blade.  Given the location of the defect, shape of the defect and the proximity to free margins a Double O-Z plasty (double transposition flap) was deemed most appropriate.  Using a sterile surgical marker, the appropriate transposition flaps were drawn incorporating the defect and placing the expected incisions within the relaxed skin tension lines where possible. The area thus outlined was incised deep to adipose tissue with a #15 scalpel blade.  The skin margins were undermined to an appropriate distance in all directions utilizing iris scissors.  Hemostasis was achieved with electrocautery.  The flaps were then transposed into place, one clockwise and the other counterclockwise, and anchored with interrupted buried subcutaneous sutures.

## 2021-08-03 PROCEDURE — 73562 X-RAY EXAM OF KNEE 3: CPT | Mod: 26,LT

## 2021-08-03 PROCEDURE — 73502 X-RAY EXAM HIP UNI 2-3 VIEWS: CPT | Mod: 26,LT

## 2021-08-03 NOTE — ED PROVIDER NOTE - CLINICAL SUMMARY MEDICAL DECISION MAKING FREE TEXT BOX
65 yo M,  hx of HTN, HLD, DM II, CAD, recent sepsis 2/2 UTI here for assessment of pain to L knee. Pain located in pop fossa, worse with ambulation, radiates down calf. No recent trauma, no fever, chills.    VS normal, patient looks well, in no distress, clear lungs, RRR, knee is not warm, red, swollen, does have mild fullness in pop fossa and proximal calf, good pulses.    Duplex without clot but does demonstrate large baker's cyst. XR without fracture, dislocation.    patient and family advised of findings, will dc home with ace wrap, ortho follow up, return precautions.

## 2021-08-03 NOTE — ED PROVIDER NOTE - PATIENT PORTAL LINK FT
You can access the FollowMyHealth Patient Portal offered by [facility] by registering at the following website: [email or web address]. By joining [employer]’s FollowMyHealth portal, you will also be able to view your health information using other applications (apps) compatible with our system.

## 2021-08-03 NOTE — ED PROVIDER NOTE - PHYSICAL EXAMINATION
CONSTITUTIONAL: Well-appearing; well-nourished; in no apparent distress.   MS: + ttp to left posterior knee. No tenderness to medial/lateral aspect/patella of left knee. No joint effusion. knee stable. no laxity. left ankle/foot/hip non-tender. N/V intact. ambulate steady but guarding gait.  SKIN: Normal for age and race; warm; dry; good turgor; no apparent lesions or exudate.   NEURO/PSYCH: A & O x 4; grossly unremarkable.

## 2021-08-03 NOTE — ED PROVIDER NOTE - OBJECTIVE STATEMENT
65 yo male hx of HTN/HLD/CAD/DM present c/o left knee pain x 1 week. pain is mostly behind his left knee. pain worsen when he extended his left knee and walk and improve with rest. pain radiating up to his left hip while walking. denies numbness/weakness to left foot. denies lower back pain/fever/chill/abd pain and urinary sxs,.

## 2021-08-03 NOTE — ED PROVIDER NOTE - CARE PROVIDER_API CALL
Reuben Clements (MD)  Orthopaedic Surgery  [address] [place] [address]  Phone: [phone]  Fax: [phone]  Follow Up Time:

## 2021-08-03 NOTE — ED PROVIDER NOTE - NSFOLLOWUPINSTRUCTIONS_ED_ALL_ED_FT
Owens Cyst    WHAT YOU NEED TO KNOW:    A Baker cyst is a bulging lump of fluid behind your knee. A Baker cyst can develop if you have a knee injury, or a condition such as osteoarthritis or a connective tissue disorder. A Baker cyst may also be called a popliteal cyst.    DISCHARGE INSTRUCTIONS:    Return to the emergency department if:   •You have severe pain.  •You have bruising on the ankle below the cyst.  •Your calf turns blue below the cyst.  •Your calf or knee is swollen or bleeding.      Call your doctor if:   •You have a fever.  •Your pain does not improve with medicine.  You have questions or concerns about your condition or care.      Medicines:   •NSAIDs, such as ibuprofen, help decrease swelling, pain, and fever. NSAIDs can cause stomach bleeding or kidney problems in certain people. If you take blood thinner medicine, always ask your healthcare provider if NSAIDs are safe for you. Always read the medicine label and follow directions.    •Take your medicine as directed. Contact your healthcare provider if you think your medicine is not helping or if you have side effects. Tell him of her if you are allergic to any medicine. Keep a list of the medicines, vitamins, and herbs you take. Include the amounts, and when and why you take them. Bring the list or the pill bottles to follow-up visits. Carry your medicine list with you in case of an emergency.    Care for your knee:   •Rest as needed. Limit movement as your knee heals. This will help decrease the risk of more damage to your knee. You may need crutches to take weight off your injured knee. Use crutches as directed.  •Ice your knee. Ice helps decrease swelling and pain. Use an ice pack, or put ice in a plastic bag. Cover it with a towel before you place it on your skin. Ice your knee for 15 to 20 minutes, 3 to 4 times each day. Do this for 2 to 3 days.  •Support your knee. Wrap your knee with an elastic bandage. Ask your healthcare provider if you need a brace for more support. This will help decrease swelling and movement so your knee can heal.  •Elevate your knee. Use pillows to raise your knee above the level of your heart as often as you can. This will help decrease swelling. Do not put the pillow directly under your knee. Put it under your calf instead.  Elevate Leg  •Go to physical therapy as directed. A physical therapist teaches you exercises to help improve movement and strength, and to decrease pain.      Follow up with your doctor as directed: Write down your questions so you remember to ask them during your visits.

## 2021-08-17 ENCOUNTER — NON-APPOINTMENT (OUTPATIENT)
Age: 66
End: 2021-08-17

## 2021-08-24 ENCOUNTER — EMERGENCY (EMERGENCY)
Facility: HOSPITAL | Age: 66
LOS: 0 days | Discharge: HOME | End: 2021-08-25
Attending: EMERGENCY MEDICINE | Admitting: EMERGENCY MEDICINE
Payer: MEDICARE

## 2021-08-24 VITALS
HEART RATE: 74 BPM | HEIGHT: 68 IN | RESPIRATION RATE: 18 BRPM | SYSTOLIC BLOOD PRESSURE: 135 MMHG | TEMPERATURE: 98 F | OXYGEN SATURATION: 98 % | DIASTOLIC BLOOD PRESSURE: 74 MMHG

## 2021-08-24 DIAGNOSIS — K62.89 OTHER SPECIFIED DISEASES OF ANUS AND RECTUM: ICD-10-CM

## 2021-08-24 DIAGNOSIS — Z20.822 CONTACT WITH AND (SUSPECTED) EXPOSURE TO COVID-19: ICD-10-CM

## 2021-08-24 DIAGNOSIS — E11.9 TYPE 2 DIABETES MELLITUS WITHOUT COMPLICATIONS: ICD-10-CM

## 2021-08-24 DIAGNOSIS — Z79.4 LONG TERM (CURRENT) USE OF INSULIN: ICD-10-CM

## 2021-08-24 DIAGNOSIS — E78.5 HYPERLIPIDEMIA, UNSPECIFIED: ICD-10-CM

## 2021-08-24 DIAGNOSIS — Z79.82 LONG TERM (CURRENT) USE OF ASPIRIN: ICD-10-CM

## 2021-08-24 DIAGNOSIS — I25.10 ATHEROSCLEROTIC HEART DISEASE OF NATIVE CORONARY ARTERY WITHOUT ANGINA PECTORIS: ICD-10-CM

## 2021-08-24 DIAGNOSIS — I10 ESSENTIAL (PRIMARY) HYPERTENSION: ICD-10-CM

## 2021-08-24 DIAGNOSIS — Z95.1 PRESENCE OF AORTOCORONARY BYPASS GRAFT: ICD-10-CM

## 2021-08-24 DIAGNOSIS — Z79.899 OTHER LONG TERM (CURRENT) DRUG THERAPY: ICD-10-CM

## 2021-08-24 DIAGNOSIS — Z98.890 OTHER SPECIFIED POSTPROCEDURAL STATES: Chronic | ICD-10-CM

## 2021-08-24 PROCEDURE — 99285 EMERGENCY DEPT VISIT HI MDM: CPT

## 2021-08-24 RX ORDER — IBUPROFEN 200 MG
600 TABLET ORAL ONCE
Refills: 0 | Status: COMPLETED | OUTPATIENT
Start: 2021-08-24 | End: 2021-08-24

## 2021-08-24 RX ADMIN — Medication 600 MILLIGRAM(S): at 23:54

## 2021-08-24 NOTE — ED ADULT TRIAGE NOTE - CHIEF COMPLAINT QUOTE
pt c/o rectal pain, denies any abdominal pain, bleeding, n/v/d. recently had colonoscopy which showed "inflammation to rectum" as per family. denies any hx of hemorrhoids. reports having relief s/p BM

## 2021-08-25 LAB
ALBUMIN SERPL ELPH-MCNC: 4.1 G/DL — SIGNIFICANT CHANGE UP (ref 3.5–5.2)
ALP SERPL-CCNC: 73 U/L — SIGNIFICANT CHANGE UP (ref 30–115)
ALT FLD-CCNC: 32 U/L — SIGNIFICANT CHANGE UP (ref 0–41)
ANION GAP SERPL CALC-SCNC: 12 MMOL/L — SIGNIFICANT CHANGE UP (ref 7–14)
APPEARANCE UR: CLEAR — SIGNIFICANT CHANGE UP
AST SERPL-CCNC: 19 U/L — SIGNIFICANT CHANGE UP (ref 0–41)
BASOPHILS # BLD AUTO: 0.01 K/UL — SIGNIFICANT CHANGE UP (ref 0–0.2)
BASOPHILS NFR BLD AUTO: 0.2 % — SIGNIFICANT CHANGE UP (ref 0–1)
BILIRUB SERPL-MCNC: 0.2 MG/DL — SIGNIFICANT CHANGE UP (ref 0.2–1.2)
BILIRUB UR-MCNC: NEGATIVE — SIGNIFICANT CHANGE UP
BUN SERPL-MCNC: 17 MG/DL — SIGNIFICANT CHANGE UP (ref 10–20)
CALCIUM SERPL-MCNC: 9.3 MG/DL — SIGNIFICANT CHANGE UP (ref 8.5–10.1)
CHLORIDE SERPL-SCNC: 106 MMOL/L — SIGNIFICANT CHANGE UP (ref 98–110)
CO2 SERPL-SCNC: 24 MMOL/L — SIGNIFICANT CHANGE UP (ref 17–32)
COLOR SPEC: SIGNIFICANT CHANGE UP
CREAT SERPL-MCNC: 1.1 MG/DL — SIGNIFICANT CHANGE UP (ref 0.7–1.5)
DIFF PNL FLD: NEGATIVE — SIGNIFICANT CHANGE UP
EOSINOPHIL # BLD AUTO: 0.16 K/UL — SIGNIFICANT CHANGE UP (ref 0–0.7)
EOSINOPHIL NFR BLD AUTO: 3 % — SIGNIFICANT CHANGE UP (ref 0–8)
GLUCOSE SERPL-MCNC: 106 MG/DL — HIGH (ref 70–99)
GLUCOSE UR QL: SIGNIFICANT CHANGE UP
HCT VFR BLD CALC: 41.4 % — LOW (ref 42–52)
HGB BLD-MCNC: 13.7 G/DL — LOW (ref 14–18)
IMM GRANULOCYTES NFR BLD AUTO: 0.4 % — HIGH (ref 0.1–0.3)
KETONES UR-MCNC: NEGATIVE — SIGNIFICANT CHANGE UP
LACTATE SERPL-SCNC: 1.6 MMOL/L — SIGNIFICANT CHANGE UP (ref 0.7–2)
LEUKOCYTE ESTERASE UR-ACNC: NEGATIVE — SIGNIFICANT CHANGE UP
LYMPHOCYTES # BLD AUTO: 1.8 K/UL — SIGNIFICANT CHANGE UP (ref 1.2–3.4)
LYMPHOCYTES # BLD AUTO: 33.3 % — SIGNIFICANT CHANGE UP (ref 20.5–51.1)
MCHC RBC-ENTMCNC: 29.6 PG — SIGNIFICANT CHANGE UP (ref 27–31)
MCHC RBC-ENTMCNC: 33.1 G/DL — SIGNIFICANT CHANGE UP (ref 32–37)
MCV RBC AUTO: 89.4 FL — SIGNIFICANT CHANGE UP (ref 80–94)
MONOCYTES # BLD AUTO: 0.45 K/UL — SIGNIFICANT CHANGE UP (ref 0.1–0.6)
MONOCYTES NFR BLD AUTO: 8.3 % — SIGNIFICANT CHANGE UP (ref 1.7–9.3)
NEUTROPHILS # BLD AUTO: 2.97 K/UL — SIGNIFICANT CHANGE UP (ref 1.4–6.5)
NEUTROPHILS NFR BLD AUTO: 54.8 % — SIGNIFICANT CHANGE UP (ref 42.2–75.2)
NITRITE UR-MCNC: NEGATIVE — SIGNIFICANT CHANGE UP
NRBC # BLD: 0 /100 WBCS — SIGNIFICANT CHANGE UP (ref 0–0)
PH UR: 6 — SIGNIFICANT CHANGE UP (ref 5–8)
PLATELET # BLD AUTO: 145 K/UL — SIGNIFICANT CHANGE UP (ref 130–400)
POTASSIUM SERPL-MCNC: 4.3 MMOL/L — SIGNIFICANT CHANGE UP (ref 3.5–5)
POTASSIUM SERPL-SCNC: 4.3 MMOL/L — SIGNIFICANT CHANGE UP (ref 3.5–5)
PROT SERPL-MCNC: 6.6 G/DL — SIGNIFICANT CHANGE UP (ref 6–8)
PROT UR-MCNC: NEGATIVE — SIGNIFICANT CHANGE UP
RBC # BLD: 4.63 M/UL — LOW (ref 4.7–6.1)
RBC # FLD: 13.6 % — SIGNIFICANT CHANGE UP (ref 11.5–14.5)
SARS-COV-2 RNA SPEC QL NAA+PROBE: SIGNIFICANT CHANGE UP
SODIUM SERPL-SCNC: 142 MMOL/L — SIGNIFICANT CHANGE UP (ref 135–146)
SP GR SPEC: 1.02 — SIGNIFICANT CHANGE UP (ref 1.01–1.03)
UROBILINOGEN FLD QL: SIGNIFICANT CHANGE UP
WBC # BLD: 5.41 K/UL — SIGNIFICANT CHANGE UP (ref 4.8–10.8)
WBC # FLD AUTO: 5.41 K/UL — SIGNIFICANT CHANGE UP (ref 4.8–10.8)

## 2021-08-25 PROCEDURE — 74177 CT ABD & PELVIS W/CONTRAST: CPT | Mod: 26,MA

## 2021-08-25 RX ORDER — HYDROCORTISONE 1 %
1 OINTMENT (GRAM) TOPICAL
Qty: 28 | Refills: 0
Start: 2021-08-25 | End: 2021-09-07

## 2021-08-25 RX ORDER — MORPHINE SULFATE 50 MG/1
4 CAPSULE, EXTENDED RELEASE ORAL ONCE
Refills: 0 | Status: COMPLETED | OUTPATIENT
Start: 2021-08-25 | End: 2021-08-25

## 2021-08-25 NOTE — ED PROVIDER NOTE - NSFOLLOWUPINSTRUCTIONS_ED_ALL_ED_FT
At this time the cause of your symptoms is unknown. Your labs, urine test and CT scan did not show any cause.    Clinically, you seem to have tenderness on exam in your prostate, this can indicate an infection known as prostatitis. You are being given antibiotics for this. A urine culture was sent to check for bacteria in your urine.    You are also being prescribed a steroid suppository. Based on your recent colonoscopy, you have inflammation in your rectum, the steroid may help with this.    You MUST follow up with your urologist, gastroenterologist and primary care doctor.    In addition, you can follow up with the colorectal surgeon, Dr. Sheehan, call to schedule an appointment.       Rectal Pain    WHAT YOU NEED TO KNOW:    Rectal pain can be caused by a number of conditions, such as hemorrhoids, an abscess, trauma, or anal tear. Infection, muscle spasms, or anal intercourse can also cause rectal pain.     DISCHARGE INSTRUCTIONS:    Medicines: You may need any of the following:   •NSAIDs, such as ibuprofen, help decrease swelling, pain, and fever. This medicine is available with or without a doctor's order. NSAIDs can cause stomach bleeding or kidney problems in certain people. If you take blood thinner medicine, always ask your healthcare provider if NSAIDs are safe for you. Always read the medicine label and follow directions.      •Prescription pain medicine may be given. Ask how to take this medicine safely.      •Antibiotics help treat or prevent a bacterial infection.      •Bowel movement softeners help soften your bowel movement. They help prevent straining and more damage to the area.      •Take your medicine as directed. Contact your healthcare provider if you think your medicine is not helping or if you have side effects. Tell him or her if you are allergic to any medicine. Keep a list of the medicines, vitamins, and herbs you take. Include the amounts, and when and why you take them. Bring the list or the pill bottles to follow-up visits. Carry your medicine list with you in case of an emergency.      Return to the emergency department if:   •You have severe pain.          Contact your healthcare provider if:   •Your pain does not decrease after 1 to 2 days of treatment.      •You cannot take the medicine prescribed for your condition.      •You have questions or concerns about your condition or care.      Take a sitz bath: Fill a bathtub with 4 to 6 inches of warm water. You may also use a sitz bath pan that fits over a toilet. Sit in the sitz bath for 20 minutes. Do this 2 to 3 times a day, or as directed. The warm water can help decrease pain, muscle spasms, or swelling.     Apply heat: Apply a warm, moist compress on your anus for 20 to 30 minutes every 2 hours for as many days as directed. Heat helps decrease pain and muscle spasms.    Eat high-fiber foods: This will help prevent constipation and soften your bowel movements. High-fiber foods include fruit, vegetables, whole-grain breads and cereals, and beans. A dietitian or healthcare provider can help you create a high-fiber meal plan.     Drink liquids as directed: You may need to drink more liquid than usual to help soften your bowel movements. Ask how much liquid to drink each day and which liquids are best for you.     Follow up with your healthcare provider as directed: Write down your questions so you remember to ask them during your visits.

## 2021-08-25 NOTE — ED PROVIDER NOTE - OBJECTIVE STATEMENT
66 year old male, past medical history htn, hdl, dm, cad, who presents with rectal pain. 66 year old male, past medical history htn, hdl, dm, cad, who presents with rectal pain. patient with recent admission for UTI/sepsis, found to have elevated PSA. patient had outpatient colonoscopy performed without significant findings to prostate, patient follows with urologist, dr jurado, has scheduled appt for cystoscopy outpatient. patient reports persistent persistent rectal pain since. denies fever, chills, abd pain, nausea/vomiting, constipation, diarrhea.

## 2021-08-25 NOTE — ED PROVIDER NOTE - PATIENT PORTAL LINK FT
You can access the FollowMyHealth Patient Portal offered by [facility] by registering at the following website: [email or web address]. By joining [employer]s FollowMyHealth portal, you will also be able to view your health information using other applications (apps) compatible with our system.

## 2021-08-25 NOTE — ED PROVIDER NOTE - NS ED ROS FT
Review of Systems:  	•	CONSTITUTIONAL: no fever, no chills   	•	SKIN: no rash  	•	ENT: no sore throat, no difficulty swallowing   	•	RESPIRATORY: no shortness of breath, no cough  	•	CARDIAC: no chest pain, no palpitations  	•	GI: no abd pain, no nausea, no vomiting, no diarrhea, +rectal pain, no bloody/dark stool   	•	GENITO-URINARY: no discharge, no dysuria; no hematuria, no increased urinary frequency  	•	MUSCULOSKELETAL: no joint paint, no swelling, no redness  	•	NEUROLOGIC: no weakness, no headache  	•	PSYCH: no anxiety, non suicidal, non homicidal, no hallucination, no depression

## 2021-08-25 NOTE — ED PROVIDER NOTE - PHYSICAL EXAMINATION
CONSTITUTIONAL: Well-developed; well-nourished; in no acute distress, nontoxic appearing  SKIN: skin exam is warm and dry  HEAD: Normocephalic; atraumatic  EYES: PERRL, conjunctiva and sclera clear  ENT: MMM  NECK: ROM intact.  CARD: S1, S2 normal, no murmur  RESP: No wheezes, rales or rhonchi. Good air movement bilaterally  ABD: soft; non-distended; non-tender. No Rebound, No guarding. LAW: +TTP overlying enlarged prostate, non-fluctuant, no stool in vault, no brbpr. Chaperone: TEMO Serrato   EXT: Normal ROM.  NEURO: awake, alert, following commands, oriented, grossly unremarkable. No Focal deficits. GCS 15.   PSYCH: Cooperative, appropriate.

## 2021-08-25 NOTE — ED PROVIDER NOTE - CLINICAL SUMMARY MEDICAL DECISION MAKING FREE TEXT BOX
65 yo M, hx of HTN, HLD, DM II, CAD, recently found to have elevated PSA, "inflammation in rectum" on outpatient colonoscopy here for assessment of rectal pain -- pain is burning, no associated constipation, dysuria, diarrhea, hemorrhoid.    VS normal, on exam has clear lungs, RRR, soft, NT, ND abdomen, no visible hemorrhoids, has ttp to prostate on rectal exam, no mucous, bleeding.    Labs, US, CT scan without acute findings.    Given prostate tenderness, known enlarged prostate, will treat as prostatitis. This also could be related to rectal "inflammation" however per daughter, they discussed this with GI and Dr. Zhong said it is , not GI related.     Advised on GI,  follow up, abx use, return precautions, follow up.

## 2021-08-25 NOTE — ED PROVIDER NOTE - CARE PROVIDER_API CALL
Abdelrahman Sheehan)  ColonRectal Surgery  [address] [place] [address], [address] [place] [address]  Phone: [phone]  Fax: [phone]  Follow Up Time: 7-10 Days    Raffy Zhong  GASTROENTEROLOGY  [address] [place] [address]  Phone: [phone]  Fax: [phone]  Follow Up Time: 7-10 Days    GRACE SOLIZ  Internal Medicine  [address] [place] [address]  Phone: ([phone]  Fax: ()-  Established Patient  Follow Up Time: 7-10 Days

## 2021-08-25 NOTE — ED PROVIDER NOTE - CARE PROVIDERS DIRECT ADDRESSES
,[email or web address],DirectAddress_Unknown,DirectAddress_Unknown

## 2021-08-25 NOTE — ED PROVIDER NOTE - PROVIDER TOKENS
PROVIDER:[TOKEN:[[id number]],FOLLOWUP:[7-10 Days]],PROVIDER:[TOKEN:[[id number]],FOLLOWUP:[7-10 Days]],PROVIDER:[TOKEN:[[id number]],FOLLOWUP:[7-10 Days],ESTABLISHEDPATIENT:[T]]

## 2021-08-26 LAB
CULTURE RESULTS: SIGNIFICANT CHANGE UP
SPECIMEN SOURCE: SIGNIFICANT CHANGE UP

## 2021-08-31 ENCOUNTER — APPOINTMENT (OUTPATIENT)
Dept: SURGERY | Facility: CLINIC | Age: 66
End: 2021-08-31
Payer: MEDICARE

## 2021-08-31 VITALS
WEIGHT: 180 LBS | HEIGHT: 67 IN | TEMPERATURE: 97.8 F | SYSTOLIC BLOOD PRESSURE: 128 MMHG | DIASTOLIC BLOOD PRESSURE: 76 MMHG | HEART RATE: 84 BPM | BODY MASS INDEX: 28.25 KG/M2

## 2021-08-31 PROCEDURE — 99213 OFFICE O/P EST LOW 20 MIN: CPT

## 2021-08-31 NOTE — PROCEDURE
[FreeTextEntry1] : Anoscopy performed using a disposable anoscope.  The patient was place in the left lateral decubitus position. After LAW was performed the anoscope was inserted and the distal rectum was evaluated along with the anal canal and anal margin.\par \par Findings:\par Examination of the perineum reveals mild perianal excoriation, mild skin tags, no erythema induration drainage or bleeding there is no palpable fluctuance or palpable mass.\par \par LAW, good tone, enlarged prostate.\par \par Anoscopy, nonprolapsing internal hemorrhoids, no evidence of anal fissure or other intra-anal pathology.

## 2021-08-31 NOTE — HISTORY OF PRESENT ILLNESS
[FreeTextEntry1] : Is a new patient visit for Mr. SUSHMA STRANGE who has complaint of rectal pain.  The patient states that this has been going on for many months.  He is had several emergency room visits for this problem.  He states that the rectal pain is relieved with defecation and or urination.  He denies any bleeding or drainage.

## 2021-08-31 NOTE — ASSESSMENT
[FreeTextEntry1] : Mr. FELDMAN ED has complaint of ongoing rectal pain.  He states the pain is improved with defecation or urination.  This is highly unusual.  He has no physical evidence of pathology on physical exam.  He has undergone a CT scan of the abdomen and pelvis twice (7/22/2021 and 8/25/2021) and I reviewed the images as well as the reports, which had no identifiable identifiable pathology.  He has had a colonoscopy which I was able to get the report from his gastroenterologist which also was negative particularly for rectal pathology in fact had minimal hemorrhoids.  He had an EGD as well which is not relevant to this examination.  In addition he has been seen by urology and started on Flomax.  Unfortunately I have no identifiable pathology that I can treat him for.  On physical exam I feel no perirectal or perianal mass lesions no fluctuance or other pathology.  His work-up to date has been negative.  His presentation as well as his symptoms are highly unusual.  He is already been treated with suppositories fiber stool softeners etc. without improvement.  I have no other therapy to offer him.  He will return to see me on an as-needed basis.

## 2021-09-27 NOTE — ED ADULT TRIAGE NOTE - MODE OF ARRIVAL
Pt lives alone, in her home. pt asking for help to clean house and rid the ghosts that are in her home and taking her things.  Pt is aox4, drives to PCP-=Dr. Gonzalez, goes to store and able to meet all daily needs.  Pt sts house is messy.  Pt refusing any care in ED.  Pt used walker or cane when walking.  Reading Dr. Gonzalez's notes, pt states same story about ghosts during visit.  Pt's only family is a cousin, Michael, that lives in [place], who she speaks with everyday.  Lexdir has been to the house and pt refused after too many questions were asked about pt's information.  Per Michael, a  has been in the home with holy water, but pt refuses many services.  Pt is very independent and wants to live in her home.  Asked pt if could refer to [facility], pt was in agreement.  Made referral and [facility] will reach out to pt today.  Pt came per EMS, needs ride home, arranged [employer] ([facility] courtesy van) for transport home.  Pt and pt's cousin in agreement with plan.     [facility]

## 2021-10-18 ENCOUNTER — APPOINTMENT (OUTPATIENT)
Dept: UROLOGY | Facility: CLINIC | Age: 66
End: 2021-10-18

## 2021-10-28 ENCOUNTER — APPOINTMENT (OUTPATIENT)
Dept: UROLOGY | Facility: CLINIC | Age: 66
End: 2021-10-28

## 2022-05-16 NOTE — ED ADULT NURSE NOTE - SUICIDE SCREENING QUESTION 1
WOUND CARE INSTRUCTIONS  for  SUTURE REMOVAL      If there are any open or bleeding areas at the incision site you should begin to cover the area with a bandage daily as follows:    Clean and dry the area with plain tap water using a Q-tip or sterile gauze pad.  Apply Vaseline, Aquaphor, Polysporin or Bacitracin ointment to the open area.  Cover the wound with a band-aid or a sterile non-stick gauze pad and micropore paper tape.       *Once the bandages are removed, the scar will be red and firm (especially in the lip/chin area). This is normal and will fade in time. It might take 6-12 months for this to happen.     *Massaging the area will help the scar soften and fade quicker. Begin to massage the area in one month. To massage apply pressure directly and firmly over the scar with the fingertips and move in a circular motion. Massage the area for a few minutes several times a day. Continue to massage the site for several months.    *Numbness in the surgical area is expected. It might take 12-18 months for the feeling to return to normal. During this time sensations of itchiness, tingling and occasional sharp pains might be noted. These feelings are normal and will subside once the nerves have completely healed.     No

## 2022-05-26 ENCOUNTER — EMERGENCY (EMERGENCY)
Facility: HOSPITAL | Age: 67
LOS: 0 days | Discharge: HOME | End: 2022-05-27
Attending: STUDENT IN AN ORGANIZED HEALTH CARE EDUCATION/TRAINING PROGRAM | Admitting: STUDENT IN AN ORGANIZED HEALTH CARE EDUCATION/TRAINING PROGRAM
Payer: MEDICARE

## 2022-05-26 VITALS
DIASTOLIC BLOOD PRESSURE: 69 MMHG | TEMPERATURE: 98 F | WEIGHT: 169.98 LBS | HEIGHT: 68 IN | RESPIRATION RATE: 16 BRPM | OXYGEN SATURATION: 99 % | HEART RATE: 80 BPM | SYSTOLIC BLOOD PRESSURE: 140 MMHG

## 2022-05-26 DIAGNOSIS — Z98.890 OTHER SPECIFIED POSTPROCEDURAL STATES: Chronic | ICD-10-CM

## 2022-05-26 DIAGNOSIS — Z79.82 LONG TERM (CURRENT) USE OF ASPIRIN: ICD-10-CM

## 2022-05-26 DIAGNOSIS — R39.11 HESITANCY OF MICTURITION: ICD-10-CM

## 2022-05-26 DIAGNOSIS — R10.9 UNSPECIFIED ABDOMINAL PAIN: ICD-10-CM

## 2022-05-26 DIAGNOSIS — Z88.5 ALLERGY STATUS TO NARCOTIC AGENT: ICD-10-CM

## 2022-05-26 DIAGNOSIS — M54.50 LOW BACK PAIN, UNSPECIFIED: ICD-10-CM

## 2022-05-26 DIAGNOSIS — Z88.0 ALLERGY STATUS TO PENICILLIN: ICD-10-CM

## 2022-05-26 DIAGNOSIS — I10 ESSENTIAL (PRIMARY) HYPERTENSION: ICD-10-CM

## 2022-05-26 DIAGNOSIS — I25.10 ATHEROSCLEROTIC HEART DISEASE OF NATIVE CORONARY ARTERY WITHOUT ANGINA PECTORIS: ICD-10-CM

## 2022-05-26 DIAGNOSIS — Z79.84 LONG TERM (CURRENT) USE OF ORAL HYPOGLYCEMIC DRUGS: ICD-10-CM

## 2022-05-26 DIAGNOSIS — E78.5 HYPERLIPIDEMIA, UNSPECIFIED: ICD-10-CM

## 2022-05-26 DIAGNOSIS — Z95.1 PRESENCE OF AORTOCORONARY BYPASS GRAFT: ICD-10-CM

## 2022-05-26 DIAGNOSIS — E11.9 TYPE 2 DIABETES MELLITUS WITHOUT COMPLICATIONS: ICD-10-CM

## 2022-05-26 LAB
ALBUMIN SERPL ELPH-MCNC: 4.1 G/DL — SIGNIFICANT CHANGE UP (ref 3.5–5.2)
ALP SERPL-CCNC: 71 U/L — SIGNIFICANT CHANGE UP (ref 30–115)
ALT FLD-CCNC: 37 U/L — SIGNIFICANT CHANGE UP (ref 0–41)
ANION GAP SERPL CALC-SCNC: 12 MMOL/L — SIGNIFICANT CHANGE UP (ref 7–14)
APPEARANCE UR: CLEAR — SIGNIFICANT CHANGE UP
AST SERPL-CCNC: 23 U/L — SIGNIFICANT CHANGE UP (ref 0–41)
BASOPHILS # BLD AUTO: 0.01 K/UL — SIGNIFICANT CHANGE UP (ref 0–0.2)
BASOPHILS NFR BLD AUTO: 0.1 % — SIGNIFICANT CHANGE UP (ref 0–1)
BILIRUB SERPL-MCNC: 0.3 MG/DL — SIGNIFICANT CHANGE UP (ref 0.2–1.2)
BILIRUB UR-MCNC: NEGATIVE — SIGNIFICANT CHANGE UP
BUN SERPL-MCNC: 24 MG/DL — HIGH (ref 10–20)
CALCIUM SERPL-MCNC: 9.5 MG/DL — SIGNIFICANT CHANGE UP (ref 8.5–10.1)
CHLORIDE SERPL-SCNC: 103 MMOL/L — SIGNIFICANT CHANGE UP (ref 98–110)
CO2 SERPL-SCNC: 24 MMOL/L — SIGNIFICANT CHANGE UP (ref 17–32)
COLOR SPEC: SIGNIFICANT CHANGE UP
CREAT SERPL-MCNC: 1.1 MG/DL — SIGNIFICANT CHANGE UP (ref 0.7–1.5)
DIFF PNL FLD: NEGATIVE — SIGNIFICANT CHANGE UP
EGFR: 74 ML/MIN/1.73M2 — SIGNIFICANT CHANGE UP
EOSINOPHIL # BLD AUTO: 0.16 K/UL — SIGNIFICANT CHANGE UP (ref 0–0.7)
EOSINOPHIL NFR BLD AUTO: 2 % — SIGNIFICANT CHANGE UP (ref 0–8)
GLUCOSE SERPL-MCNC: 144 MG/DL — HIGH (ref 70–99)
GLUCOSE UR QL: NEGATIVE — SIGNIFICANT CHANGE UP
HCT VFR BLD CALC: 42.8 % — SIGNIFICANT CHANGE UP (ref 42–52)
HGB BLD-MCNC: 14.3 G/DL — SIGNIFICANT CHANGE UP (ref 14–18)
IMM GRANULOCYTES NFR BLD AUTO: 0.2 % — SIGNIFICANT CHANGE UP (ref 0.1–0.3)
KETONES UR-MCNC: NEGATIVE — SIGNIFICANT CHANGE UP
LEUKOCYTE ESTERASE UR-ACNC: NEGATIVE — SIGNIFICANT CHANGE UP
LYMPHOCYTES # BLD AUTO: 2.2 K/UL — SIGNIFICANT CHANGE UP (ref 1.2–3.4)
LYMPHOCYTES # BLD AUTO: 27.4 % — SIGNIFICANT CHANGE UP (ref 20.5–51.1)
MCHC RBC-ENTMCNC: 29.7 PG — SIGNIFICANT CHANGE UP (ref 27–31)
MCHC RBC-ENTMCNC: 33.4 G/DL — SIGNIFICANT CHANGE UP (ref 32–37)
MCV RBC AUTO: 89 FL — SIGNIFICANT CHANGE UP (ref 80–94)
MONOCYTES # BLD AUTO: 0.72 K/UL — HIGH (ref 0.1–0.6)
MONOCYTES NFR BLD AUTO: 9 % — SIGNIFICANT CHANGE UP (ref 1.7–9.3)
NEUTROPHILS # BLD AUTO: 4.91 K/UL — SIGNIFICANT CHANGE UP (ref 1.4–6.5)
NEUTROPHILS NFR BLD AUTO: 61.3 % — SIGNIFICANT CHANGE UP (ref 42.2–75.2)
NITRITE UR-MCNC: NEGATIVE — SIGNIFICANT CHANGE UP
NRBC # BLD: 0 /100 WBCS — SIGNIFICANT CHANGE UP (ref 0–0)
PH UR: 6 — SIGNIFICANT CHANGE UP (ref 5–8)
PLATELET # BLD AUTO: 160 K/UL — SIGNIFICANT CHANGE UP (ref 130–400)
POTASSIUM SERPL-MCNC: 4.5 MMOL/L — SIGNIFICANT CHANGE UP (ref 3.5–5)
POTASSIUM SERPL-SCNC: 4.5 MMOL/L — SIGNIFICANT CHANGE UP (ref 3.5–5)
PROT SERPL-MCNC: 6.6 G/DL — SIGNIFICANT CHANGE UP (ref 6–8)
PROT UR-MCNC: NEGATIVE — SIGNIFICANT CHANGE UP
RBC # BLD: 4.81 M/UL — SIGNIFICANT CHANGE UP (ref 4.7–6.1)
RBC # FLD: 14.1 % — SIGNIFICANT CHANGE UP (ref 11.5–14.5)
SODIUM SERPL-SCNC: 139 MMOL/L — SIGNIFICANT CHANGE UP (ref 135–146)
SP GR SPEC: 1.02 — SIGNIFICANT CHANGE UP (ref 1.01–1.03)
UROBILINOGEN FLD QL: SIGNIFICANT CHANGE UP
WBC # BLD: 8.02 K/UL — SIGNIFICANT CHANGE UP (ref 4.8–10.8)
WBC # FLD AUTO: 8.02 K/UL — SIGNIFICANT CHANGE UP (ref 4.8–10.8)

## 2022-05-26 PROCEDURE — 99285 EMERGENCY DEPT VISIT HI MDM: CPT

## 2022-05-26 PROCEDURE — 74176 CT ABD & PELVIS W/O CONTRAST: CPT | Mod: 26,MA

## 2022-05-26 RX ORDER — ONDANSETRON 8 MG/1
4 TABLET, FILM COATED ORAL ONCE
Refills: 0 | Status: COMPLETED | OUTPATIENT
Start: 2022-05-26 | End: 2022-05-26

## 2022-05-26 RX ORDER — SODIUM CHLORIDE 9 MG/ML
1000 INJECTION INTRAMUSCULAR; INTRAVENOUS; SUBCUTANEOUS ONCE
Refills: 0 | Status: COMPLETED | OUTPATIENT
Start: 2022-05-26 | End: 2022-05-26

## 2022-05-26 RX ORDER — KETOROLAC TROMETHAMINE 30 MG/ML
15 SYRINGE (ML) INJECTION ONCE
Refills: 0 | Status: DISCONTINUED | OUTPATIENT
Start: 2022-05-26 | End: 2022-05-26

## 2022-05-26 RX ADMIN — Medication 15 MILLIGRAM(S): at 22:14

## 2022-05-26 RX ADMIN — ONDANSETRON 4 MILLIGRAM(S): 8 TABLET, FILM COATED ORAL at 22:14

## 2022-05-26 RX ADMIN — SODIUM CHLORIDE 1000 MILLILITER(S): 9 INJECTION INTRAMUSCULAR; INTRAVENOUS; SUBCUTANEOUS at 22:03

## 2022-05-26 NOTE — ED ADULT NURSE NOTE - OBJECTIVE STATEMENT
p/w abdominal pain to rlq  hx of open heart 6 years ago  reports decreased urine  hx of hernia  axo4

## 2022-05-26 NOTE — ED ADULT TRIAGE NOTE - AS TEMP SITE
Jarrod Santiago DO  5/5/2022    oral

## 2022-05-27 VITALS
TEMPERATURE: 96 F | SYSTOLIC BLOOD PRESSURE: 137 MMHG | RESPIRATION RATE: 18 BRPM | DIASTOLIC BLOOD PRESSURE: 78 MMHG | OXYGEN SATURATION: 100 % | HEART RATE: 70 BPM

## 2022-05-27 NOTE — ED PROVIDER NOTE - ATTENDING APP SHARED VISIT CONTRIBUTION OF CARE
68 yo male hx of HTN/DM/CAD s/p CABG present c/o right sided lower back pain that radiating to his right groin with urinary hesitancy for few days and dysuria. no fevers, no vomiting, or diarrhea.  No testicular pain/swelling/trauma.     CONSTITUTIONAL: Well-developed; well-nourished; in no acute distress.   SKIN: warm, dry.  HEAD: Normocephalic; atraumatic.  EYES: PERRL, EOMI, no conjunctival erythema  ENT: No nasal discharge; airway clear.  NECK: Supple; non tender.  CARD: S1, S2 normal; no murmurs, gallops, or rubs. Regular rate and rhythm.   RESP: No wheezes, rales or rhonchi.  ABD: soft nondistended, minimal tenderness in the suprapubic region. No cva tenderness.  BACK: No midline spinal tenderness.   EXT: Normal ROM.  No clubbing, cyanosis or edema. pulses intact to bilateral LEs.   NEURO: Alert, oriented, grossly unremarkable.  PSYCH: Cooperative, appropriate.    r/o UTI/kidney stone  plan for labs, imaging, ua, reassess.

## 2022-05-27 NOTE — ED PROVIDER NOTE - NS ED ROS FT
Constitutional: no fever, chills, no recent weight loss, change in appetite or malaise  Eyes: no redness/discharge/pain/vision changes  ENT: no rhinorrhea/ear pain/sore throat  Cardiac: No chest pain, SOB or edema.  Respiratory: No cough o r respiratory distress  GI: No nausea, vomiting, diarrhea or abdominal pain.  : see HPI  MS: no pain to back or extremities, no loss of ROM, no weakness  Neuro: No headache or weakness. No LOC.  Skin: No skin rash.  Endocrine: No history of thyroid disease or diabetes.

## 2022-05-27 NOTE — ED PROVIDER NOTE - NS ED ATTENDING STATEMENT MOD
This was a shared visit with the JAMAL. I reviewed and verified the documentation and independently performed the documented:

## 2022-05-27 NOTE — ED PROVIDER NOTE - PROVIDER TOKENS
Dia Muniz  came in for Prevnar 13 injection, given in left deltoid Injection administered by Anne Jimenez CMA. Pt.tolerated well. Advised to call the office for any adverse reactions. FREE:[LAST:[Your Urologist],PHONE:[(   )    -],FAX:[(   )    -]]

## 2022-05-27 NOTE — ED PROVIDER NOTE - PATIENT PORTAL LINK FT
You can access the FollowMyHealth Patient Portal offered by [facility] by registering at the following website: [email or web address]. By joining [employer]’s FollowMyHealth portal, you will also be able to view your health information using other applications (apps) compatible with our system.

## 2022-05-27 NOTE — ED PROVIDER NOTE - CLINICAL SUMMARY MEDICAL DECISION MAKING FREE TEXT BOX
68 yo male hx of HTN/DM/CAD s/p CABG present c/o right sided lower back pain that radiating to his right groin with urinary hesitancy for few days. labs and imaging reviewed without acute pathology. patient comfortable in the ED. follow up with urology for further evaluation. strict return precautions discussed in detail with patient and family member bedside.

## 2022-05-27 NOTE — ED PROVIDER NOTE - OBJECTIVE STATEMENT
68 yo male hx of HTN/DM/CAD s/p CABG present c/o right sided lower back pain that radiating to his right groin with urinary hesitancy for few days. pain continues so he comes to ED for evaluation. Denies dysuria/urgency. denies penile discharge/lesion and testicles pain and swelling. Denies fever/chill/HA/dizziness/chest pain/palpitation/sob/abd pain/n/v/d/ black stool/bloody stool

## 2022-05-27 NOTE — ED PROVIDER NOTE - PHYSICAL EXAMINATION
CONSTITUTIONAL: Well-appearing; well-nourished; in no apparent distress.   EYES: PERRL; EOM intact.   CARDIOVASCULAR: Normal S1, S2; no murmurs, rubs, or gallops.   RESPIRATORY: Normal chest excursion with respiration; breath sounds clear and equal bilaterally; no wheezes, rhonchi, or rales.  GI/: Normal bowel sounds; non-distended; non-tender; no palpable organomegaly.   MS: No midline tenderness to neck/back.   SKIN: No rash to right flank   NEURO/PSYCH: A & O x 4; grossly unremarkable.

## 2022-05-28 LAB
CULTURE RESULTS: SIGNIFICANT CHANGE UP
SPECIMEN SOURCE: SIGNIFICANT CHANGE UP

## 2022-06-02 ENCOUNTER — APPOINTMENT (OUTPATIENT)
Dept: SURGERY | Facility: CLINIC | Age: 67
End: 2022-06-02

## 2022-06-24 NOTE — ED ADULT NURSE NOTE - NS ED NURSE RECORD ANOTHER HT AND WT
Yes Ketoconazole Pregnancy And Lactation Text: This medication is Pregnancy Category C and it isn't know if it is safe during pregnancy. It is also excreted in breast milk and breast feeding isn't recommended.

## 2022-06-26 NOTE — ED ADULT NURSE NOTE - LOCATION
Cardiology Progress Note 6/23/22    Assessment & Plan   61-year-old man with history of ischemic cardiomyopathy LVEF 15%) NYHA class IV symptoms ACC stage D, multiple admissions with heart failure over the past several months. Presented with cardiogenic shock c/b multi-organ failure (JOSE; Acute liver injury/shock liver) requiring mechanical hemodynamic support. Now listed as status 2 for OHT. Course c/b large epistaxis 6/22 with 400 ml blood. Had subclavian IABP placed instead of femoral IABP 6/23/22. Hemoglobin gradually downtrending without e/o further e/o bleeding. Continue to optimize his hemodynamics while awaiting for heart transplant.      Changes Today:  - remain stable with dobutamine 2.5, IABP 1:1  - lower filling pressures from this AM hemodynamics but given stability in renal function and MAPs will defer IVF today   - s/p subclavian IABP 6/23/22   - IABP MAP not accurate, currently base his blood pressure on his cuff pressure  - swan pulled 6/26  - swan compared to PICC prior to swan was pulled*    [place]  PICC      CVP       5          10      SvO2     57        50  - promote ambulation  - start caloric count  - listed as status 2  - plan for in and out RHC 7/1/22, needs renewal of status 2 on 7/4/22    ===============  CARDIOVASCULAR  ===============  # Ambulatory cardiogenic shock SCAI D  # Advanced ischemic cardiomyopathy, Class IV, Stage D  # s/p CRT-D ([employer] 2006, gen change 2012)  # SVT  # Nonsustained VT  # CAD s/p PCI to LAD (2005)  # Hx of MI (2007)  # Severe ostial LAD disease with in-stent restenosis of mid LAD at diag bifurcation, severe proximal RCA disease, mild circ disease now s/p ostial LAD and proximal RCA lithotripsy and stenting on 5/24/22  CMRI showing infarcted myocardium in LAD territory. Given the degree of LV dysfunction/dilation, moderate to severe functional MR and lack of viable myocardium, cardiac surgery would be high risk. Now s/p ostial LAD and proximal RCA lithotripsy  and stenting on 5/24/22.      DATE MAP CVP PAP PCWP Marley CO Marley CI SVR PVR Therapies   6/24/22  10 AM 73 8 36/18 18 5.5 3.1 1163 1.1 - cont IABP 1:1  - cont dobu 2.5  - off nipride  - s/p bumex 2 mg IV*1     6/25/22 72 8 39/21 11 4.9 2.8 1050 3.2 - continue IABP  - continue dobu  - hold off diuresis    6/26/22 72 4 40/21 12 4.9 2.8 1110 3 - continue IABP and dobu  - defer IVF despite low CVP                                                     Plan:  He will remain on 1:1 IABP with dobutamine gtt 2.5 to maintain his hemodynamics + hemodynamic-guided diuresis & afterload reduction. Monitor hemodynamic q6h.    - hemodynamic support:   - Continue iABP 1:1    - IABP MAP not accurate, will use cuff pressure for his blood pressure   - Inotropes:              - continue dobutamine 2.5; previously weaned off 6/22/22, restarted ~ 12 PM 6/23/22  - Afterload reduction:   - continue to hold hydralazine 50 mg, isordil 20 mg    - off nitroprusside since 6/24 in the AM  - Fluid status: lower filling pressures from this AM hemodynamics but given stability in renal function and MAPs will defer IVF today. Goal CVP 8-10   - Goal CVP 8-10  - Anticoagulation:    - warfarin on hold   - low intensity heparin gtt continued for h/o DVT/PE    - restarted 6PM 6/23 after subclavian IABP placement  - Antiplatelet: Continue ASA-81mg   - Statin: hold statin   - BMP BID, K>4 & Mg>2  - Strict I/Os: Goal even to positive for low CVP  - Daily weight    ===========   PULMONARY  ===========  # Mild-Moderate Emphysema  # Hx of COVID-19  Former smoker 2 ppd for 40 years quit on 09/09/09.  - Appreciate pulmonary consultation given emphysema   - ILD panel:     - AAT 1 level: in process    - autoimmune work- up:     - LASHANDA positive     - Anti Helen-1 IgG negative     - SSA RO, SSB LA Ab negative     - Scleroderma ab scl 70 negative     - RF negative     - anti CCP 1.7     - ANCA IgG < 1:10     - Aldolase: 19.6(elevated)   - no need for PFTs with DLCO     - if  symptoms are considered related to pulm findings: inhaler therapy with LABA/LAMA combination   - will need outpatient pulmonary follow up with seiral PFT hs- CT scan with inspiration and expiration views    =====  Renal  =====  -Strict I&O while diuresing   -Monitor Electrolytes while actively diuresing K->4 Mg->2    ===================   GASTROENTEROLOGY  ===================  # Severe malnutrition in the context of chronic illness  # Liver injury, in the setting of cardiogenic shock  # GERD  - monitor  - Diuresis as above  - Supplements to help maintain nutrition.  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  - continue low intensity heparin gtt    # Anemia, acute on chronic  # Iron deficiency  Baseline hgb 8-9. Started to downtrend since nose bleed 6/22. Gradually downtrending without signs of new or ongoing bleeding. Found to have iron deficiency. Hgb today is 6.7.  - defer transfusion at this time; goal hemoglobin 6 - 6.5  - Iron IV ordered, dc'ed oral iron  - continue pediatric tubes for labs, limited blood draw  - monitor for signs of bleeding    # low platelets 2/2 platelet clumping from lab reports  - continue to monitor    # Profuse epistaxis from the left nare with bloody emesis  ~ 400 ml of bloody emesis in the blue bag on arrival. Also had one severe epistaxis that occurred at [place] requiring cauterization.  - ENT consult placed overnight  - Left nasal cavity packed with all absorbable packing - surgifoam + surgicel + surgiflo. No antibiotics are indicated due to absorbable packing.  - saline nasal pray q3h  - if begins to  in quantity of  bleeding would recommend spraying the nasal cavity copiously with afrin and holding pressure over the soft part of the nose for 20 minutes continuously. Nasal clips are ineffective and should not be used in place of digital pressure. If bleeding continues despite these measures, repeat. If bleeding continues or is severe please contact ENT.    ==   ID  ==  ROHIT     ===============  RHEUMATOLOGY  ===============  #Lupus  # APL  - Continue pta hydroxychloroquine 200mg bid   - PTA mycophenolic acid 1500mg q12h on hold in anticipation for LVAD  -- Would plan to hold MMF one week prior to surgery and re-starting 5-7 days after surgery in the absence of surgical site infection, poor wound healing or infection elsewhere.     ===    ===  - Continue pta tamsulosin 0.4mg daily     Diet: Low salt diet <2 g/24 hours  DVT Prophylaxis: Heparin GTT  Fitzgerald Catheter: In place  Code Status: Full   Lines: PICC Line     LVAD/Transplant Evaluation Checklist  [x] labs (CBC, CMP, PT/INR, cystatin C, prealbumin, UA + micro)  [x] Infectious (Hep A/B/C, HIV, treponema, HSV 1/2 IgG, CMV IgG, Toxo IgG, EBV IgG, varicella IgG, Quant gold, COVID vaccine/PCR)  [x] Utox/nicotine and cotinine/PeTH   [x] Immunocompatibility (last transfusion, ABO, HLA tissue typing, PRA)  [x] ECG, Echo  [x] CPX - can be oupatient  [x] 6MWT - can be outpatient  [x] RHC, completed but needs repeat in 2-3 months to assess PVR  [x] CVTS consult  [x] Social work consult  [x]  Palliative care consult: final note pending  [x] Neuropsych consult: order placed: final note pending  [x] Nutrition consult  [x] CT Dental   [x] Dental consult  [x] Abd US + doppler  - Abd US complete done, not with doppler; OK given unremarkable CT CAP  [x] Extremity US and ABIs  [x] Carotid US (if DM or ICM or >49yo)  [x] PFTs: outpatient  [x] Dexa: outpatient  [x] CT head non-contrast  [x] CT CAP non-contrast  [x] Colonoscopy (>49 yo)  [x] PSA/HCG    Staffed with Dr. Mendez.    Liborio  "MD Kinjal   PGY-2 Internal Medicine  ----------------------------------------------------------------------------------------------------------------    Interval History    NAEO. Ambulating well yesterday. Eat better. Denies chest pain, shortness of breath, lightheadedness while walking, N/V, abdominal pain. Denies fever/chills. Denies blurry vision.    Physical Exam   Temp: 97.9  F (36.6  C) Temp src: Oral BP: 106/59 Pulse: 98   Resp: 22 SpO2: 93 % O2 Device: None (Room air)    Vitals:    06/23/22 0600 06/24/22 0400 06/25/22 0400   Weight: 66.3 kg (146 lb 2.6 oz) 67.6 kg (149 lb 0.5 oz) 64.8 kg (142 lb 13.7 oz)     Vital Signs with Ranges  Temp:  [97.3  F (36.3  C)-98.6  F (37  C)] 97.9  F (36.6  C)  Pulse:  [] 98  Resp:  [14-31] 22  BP: ()/(53-78) 106/59  MAP:  [31 mmHg-51 mmHg] 31 mmHg  Arterial Line BP: (61-67)/(23-33) 67/33  SpO2:  [92 %-100 %] 93 %  I/O last 3 completed shifts:  In: 1348.15 [P.O.:580; I.V.:768.15]  Out: 1800 [Urine:1800]     Blood pressure 106/59, pulse 98, temperature 97.9  F (36.6  C), temperature source Oral, resp. rate 22, height 1.702 m (5' 7\"), weight 64.8 kg (142 lb 13.7 oz), SpO2 93 %.  142 lbs 13.73 oz  GEN:  Alert, oriented x 3, appears comfortable, NAD.  ENT: injected conjunctivae   CV:  2+ bilateral radial pulses present. Good peripheral perfusion.   Chest wall: IABP on right upper chest wall without discharge/visible bleeding. Marked tenderness overlying IABP.   LUNGS:  Clear to auscultation bilaterally   ABD:  Active bowel sounds, soft, mildly distended, no significant tenderness/discomfort.  No rebound/guarding/rigidity.  Right groin area: prior IABP site without hematoma  EXT:  No edema or cyanosis.    Neuro: A&O *3, nonfocal    Medications    DOBUTamine 2.5 mcg/kg/min (06/26/22 0700)    heparin 1,200 Units/hr (06/26/22 0700)      aspirin  81 mg Oral Daily    DULoxetine  30 mg Oral Daily    ferrous sulfate  325 mg Oral Daily with lunch    " fluticasone-vilanterol  1 puff Inhalation Daily    hydroxychloroquine  200 mg Oral BID    lidocaine  2 patch Transdermal Q24h    And    lidocaine   Transdermal Q8H CAMMY    polyethylene glycol  17 g Oral TID    [Held by provider] potassium chloride  20 mEq Oral BID    senna-docusate  3 tablet Oral BID    sodium chloride  2 spray Both Nostrils Q3H    tamsulosin  0.4 mg Oral Daily    thiamine  100 mg Oral Daily    traZODone  100 mg Oral At Bedtime       Data   Recent Labs   Lab 06/26/22  0409 06/25/22 2058 06/25/22  0355 06/24/22 2059 06/24/22  1711 06/24/22  1602 06/24/22  1005 06/24/22  0350   WBC 7.8  --  8.4  --   --   --   --  9.8   HGB 6.7*  --  7.5*  --  7.5*  --   --  7.1*   MCV 94  --  94  --   --   --   --  96     --  137*  --   --   --   --  207   INR 1.22*  --  1.36*  --   --   --   --  1.42*   NA  --   --  132* 132*  --  134*   < > 130*   POTASSIUM  --   --  4.0 3.8  --  3.6   < > 4.0   CHLORIDE  --   --  99 98  --  100   < > 99   CO2  --   --  21* 23  --  20*   < > 22   BUN  --   --  11.6 13.3  --  13.0   < > 15.6   CR  --   --  0.51* 0.64*  --  0.73   < > 0.62*   ANIONGAP  --   --  12 11  --  14   < > 9   ELDA  --   --  8.7* 8.9  --  8.5*   < > 8.6*   GLC  --  97 133* 134*  --  94   < > 133*   ALBUMIN  --   --  3.5 3.9  --  3.7   < > 3.0*   PROTTOTAL  --   --  5.8* 6.1*  --  6.1*   < > 5.7*   BILITOTAL  --   --  0.6 0.6  --  0.5   < > 0.5   ALKPHOS  --   --  94 101  --  102   < > 92   ALT  --   --  216* 242*  --  274*   < > 282*   AST  --   --  56* 63*  --  71*   < > 79*    < > = values in this interval not displayed.       Recent Results (from the past 24 hour(s))   XR Chest Port 1 View    Narrative    XR CHEST PORT 1 VIEW  6/25/2022 11:12 AM      HISTORY: Epsom janae and balloon pump placement    COMPARISON: 6/25/2022, 6/24/2020    FINDINGS: Frontal abdominal radiograph. Stable implantable cardiac  device, epicardial pacer wires, Epsom-Janae catheter, intra-arterial  balloon pump sheath, PICC line.  Stable mediastinal silhouette. No  focal pulmonary opacity, pleural effusion, or pneumothorax. Bones and  upper abdomen are unremarkable.      Impression    IMPRESSION: [place]-Janae catheter tip projects over the pulmonary trunk,  IABP superior marker projects at the level of the paola. Otherwise  stable exam.    I have personally reviewed the examination and initial interpretation  and I agree with the findings.    KHADAR CAMPOS MD         SYSTEM ID:  [id number]      abdomen

## 2022-07-11 NOTE — ED ADULT NURSE NOTE - ISOLATION TYPE:
Doing well  Walks with cane  Requesting renew to his temp handicapped parking which was granted  F/u 1 yr  
None

## 2022-07-19 NOTE — H&P PST ADULT - URINARY CATHETER
[de-identified] : Back, including spine: Palpation of the thoracic/lumbar spine is as follows: bilateral lumbar paraspinal spasm and bilateral lumbar paraspinal tenderness. Range of motion of the thoracic and lumbar spine is as follows: Diminished range of motion in all planes. Special testing of the thoracic and lumbar spine is as follows: Positive jung maneuver/facet loading bilaterally.  no

## 2022-08-02 ENCOUNTER — APPOINTMENT (OUTPATIENT)
Dept: MRI IMAGING | Facility: CLINIC | Age: 67
End: 2022-08-02

## 2022-08-02 PROCEDURE — 72148 MRI LUMBAR SPINE W/O DYE: CPT

## 2022-08-02 PROCEDURE — 72141 MRI NECK SPINE W/O DYE: CPT

## 2022-09-21 ENCOUNTER — APPOINTMENT (OUTPATIENT)
Dept: PAIN MANAGEMENT | Facility: CLINIC | Age: 67
End: 2022-09-21

## 2022-10-12 ENCOUNTER — APPOINTMENT (OUTPATIENT)
Dept: PAIN MANAGEMENT | Facility: CLINIC | Age: 67
End: 2022-10-12

## 2022-10-19 ENCOUNTER — APPOINTMENT (OUTPATIENT)
Dept: PAIN MANAGEMENT | Facility: CLINIC | Age: 67
End: 2022-10-19

## 2022-10-19 VITALS
WEIGHT: 180 LBS | DIASTOLIC BLOOD PRESSURE: 83 MMHG | HEART RATE: 73 BPM | HEIGHT: 67 IN | BODY MASS INDEX: 28.25 KG/M2 | SYSTOLIC BLOOD PRESSURE: 154 MMHG

## 2022-10-19 DIAGNOSIS — M48.02 SPINAL STENOSIS, CERVICAL REGION: ICD-10-CM

## 2022-10-19 DIAGNOSIS — G99.2 SPINAL STENOSIS, CERVICAL REGION: ICD-10-CM

## 2022-10-19 PROCEDURE — 99214 OFFICE O/P EST MOD 30 MIN: CPT

## 2022-10-19 NOTE — PHYSICAL EXAM
[Normal Coordination] : normal coordination [Normal Sensation] : normal sensation [Normal Mood and Affect] : normal mood and affect [Able to Communicate] : able to communicate [Well Developed] : well developed [Well Nourished] : well nourished [Flexion] : flexion [Extension] : extension [Rotation to left] : rotation to left [Rotation to right] : rotation to right [] : patient ambulates without assistive device [FreeTextEntry8] : +SLR r at 45 degrees, negative on the L

## 2022-10-19 NOTE — ASSESSMENT
[FreeTextEntry1] : 67 year old male with chronic lumbar and cervical pain with radiculopathy.  We will have him undergo PT and request authorization for R L3-L5 SNRI with MAC and f/u once completed.  He is aware if there are any issues he can contact the office.\par \par I personally reviewed with the PA, this patient's history and physical exam findings, as documented above. I have discussed the relevant areas of concern, having direct implications to the presenting problems and illnesses, and I have personally examined all pertinent and positive and negative findings, which impact on the prior pain management treatment. \par \par \par The patient has severe anxiety of procedures that necessitates monitored anesthesia care (MAC). The procedure performed will be close to major nerves, arteries, and spinal cord and/or joint structures. Due to the proximity of these structures, we need the patient to be still during the procedure. With the help of MAC, this will be safely achieved and decrease the risk of any complications.\par \par Trisha Lanier, MS, PA-C\par Ruben Luo, \par

## 2022-10-19 NOTE — HISTORY OF PRESENT ILLNESS
[FreeTextEntry1] : HISTORY OF PRESENT ILLNESS: This is a 67 year old man complaining of left-sided lower back and bilateral neck pain. Patient notes that pain in his lower back is the worst area of pain. The patient has had this pain for 4-5 months. The pain has worsened in the last 3-2 months. The pain started after no specific injury or event. Patient describes pain as moderate to severe. During the last month the pain has been nearly constant with symptoms worsening in no typical pattern. Pain described as burning, pressure-like, and cramping. Pain is associated with numbness and pins and needles into the upper and lower extremities. Patient has weakness in the lower extremities. Pain is increased with standing, walking, exercise, coughing/sneezing and bowel movements. Pain is decreased with lying down and sitting. Pain is not changed with relaxation. Bowel or bladder habits have changed as the patient notes using the bathroom is pain on both urination and bowel movements.\par \par ACTIVITIES: Patient could walk about 3 minutes before the pain starts. Patient can sit 1 hour before pain starts. Patient can stand 5 minutes before pain starts. The patient constantly lays down because of pain. Patient uses no assistive walking device at this time. Patient has difficulty chores, doing yard work or shopping, socializing with friends, participating in recreational activities and exercising at this time.\par \par PRIOR PAIN TREATMENTS: Moderate relief with physical therapy and heat treatment\par \par PRIOR PAIN MEDICATIONS: Tylenol, aspirin and Motrin\par \par TODAY:  I had the pleasure of seeing Mr. Joseph today in follow up.  His previous history and physical findings have been reviewed.\par \par He is under our care for chronic lumbar and cervical pain with radiculopathy which he is receiving continuing active treatment for.  He underwent MRI of cervical and lumbar spine for further evaluation of his complaints and presents today to review the results.  MRI cervical spine reveals mild straightening of sagittal lordosis. Multilevel spondylosis without cord compression. Mild C2-3 through C5-6 spondylosis. Small bilateral C3-4 and C4-5 foraminal osteophytes. C5-6 left foraminal stenosis and C6 foraminal root impingement. C6-7 chronic disc degeneration and bilateral foraminal stenosis impinging on the bilateral foraminal C7 roots.  MRI lumbar spine reveals straightening of sagittal lordosis. Mild multilevel spondylosis. 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 Bilateral S1 lateral recess stenosis and bilateral L5-S1 foraminal stenosis. L4-5 moderate spinal and foraminal stenosis. L3-4 asymmetric disc bulge impinges on the right foramen. Multiple partially visualized left renal cysts measuring up to 4 cm in diameter.  He currently rates his neck pain a 6/10 and states he feels stiffness with ROM and parasthesias that radiate into his upper extremtiies.  He states his lower back pain is worse rating it a 7-8/10 stating he has a hard time walking up stairs or ambulating for long periods.  He has a hard time getting up from seated position due to pain and stiffness.  He states the pain is across his back but radiates down the R leg into his knee.  He is not interested in taking medication and we will discuss other treatment options.  With respect to possible renal cysts he will f/u with his PCP.\par \par \par \par

## 2022-11-08 NOTE — ED ADULT TRIAGE NOTE - TEMPERATURE IN FAHRENHEIT (DEGREES F)
#[phone]  Jelena Camejo with [facility] states she is reporting pt/inr. PT 15.6  INR  1.3    PT takes 1 mg Sat-Mon and 2 mg all other days. Please call Jelena Camejo yet today to inform so she can let pt know.
Two pt identifiers confirmed. Per , she is to increase coumadin to 2 mg every day. Recheck INR in 2 weeks. Pt verbalized understanding of information discussed w/ no further questions at this time.   Signed By: Tiffany Gibbs LPN     November 8, 4994
98.4

## 2022-11-22 ENCOUNTER — APPOINTMENT (OUTPATIENT)
Dept: PAIN MANAGEMENT | Facility: CLINIC | Age: 67
End: 2022-11-22

## 2023-02-13 ENCOUNTER — APPOINTMENT (OUTPATIENT)
Dept: PAIN MANAGEMENT | Facility: CLINIC | Age: 68
End: 2023-02-13

## 2023-02-13 NOTE — ASU PATIENT PROFILE, ADULT - PAIN CHRONIC, PROFILE
Place warmth onto the painful areas of the left neck and upper back for 20 minutes at a time, every 2 hours while awake     Sleep on a pillow that is not too high and not too low so that the spine of the neck is aligned with the rest of the spine.      Massage the neck if possible.      Follow up if not better in 7-10 days.    
no

## 2023-03-21 ENCOUNTER — APPOINTMENT (OUTPATIENT)
Dept: PAIN MANAGEMENT | Facility: CLINIC | Age: 68
End: 2023-03-21
Payer: MEDICARE

## 2023-03-21 VITALS
SYSTOLIC BLOOD PRESSURE: 142 MMHG | WEIGHT: 180 LBS | DIASTOLIC BLOOD PRESSURE: 82 MMHG | HEART RATE: 89 BPM | BODY MASS INDEX: 28.25 KG/M2 | HEIGHT: 67 IN

## 2023-03-21 PROCEDURE — 99213 OFFICE O/P EST LOW 20 MIN: CPT

## 2023-03-23 NOTE — ASSESSMENT
[FreeTextEntry1] : 67 year old male with chronic lumbar and cervical pain with radiculopathy.  He presents with stenosis and has trialed physician directed medication management and two months of physical therapy, in which neither has provided him with adequate relief of his symptoms. We will re-request authorization for R L3-L5 SNRI with MAC.\par \par Patient had a MRI that shows a radicular component along with pain referred into the lower extremity. Patient has trialed rehab (Home exercise, physical therapy or chiropractic care) and medications I will schedule a right L3-L5 SNRI w/ MAC. \par \par Risk, benefits, pros and cons of procedure were explained to the patient using models and diagrams and their questions were answered. \par \par The patient has severe anxiety of procedures that necessitates monitored anesthesia care (MAC). The procedure performed will be close to major nerves, arteries, and spinal cord and/or joint structures. Due to the proximity of these structures, we need the patient to be still during the procedure. With the help of MAC, this will be safely achieved and decrease the risk of any complications.\par \par I, Smita Pierson, attest that this documentation has been prepared under the direction and in the presence of Provider Steph Ríos The documentation recorded by the scribe, in my presence, accurately reflects the service I personally performed, and the decisions made by me with my edits as appropriate.\par \par \par Best Regards,  \par \par \par Ruben Luo D.KAMINI.  \par \par Diplomat, American Board of Anesthesiology  \par \par Diplomat, American Board of Pain Medicine  \par \par Diplomat, American Board of Pain Management\par

## 2023-03-23 NOTE — HISTORY OF PRESENT ILLNESS
[FreeTextEntry1] : HISTORY OF PRESENT ILLNESS: This is a 67 year old man complaining of left-sided lower back and bilateral neck pain. Patient notes that pain in his lower back is the worst area of pain. The patient has had this pain for 4-5 months. The pain has worsened in the last 3-2 months. The pain started after no specific injury or event. Patient describes pain as moderate to severe. During the last month the pain has been nearly constant with symptoms worsening in no typical pattern. Pain described as burning, pressure-like, and cramping. Pain is associated with numbness and pins and needles into the upper and lower extremities. Patient has weakness in the lower extremities. Pain is increased with standing, walking, exercise, coughing/sneezing and bowel movements. Pain is decreased with lying down and sitting. Pain is not changed with relaxation. Bowel or bladder habits have changed as the patient notes using the bathroom is pain on both urination and bowel movements.\par \par ACTIVITIES: Patient could walk about 3 minutes before the pain starts. Patient can sit 1 hour before pain starts. Patient can stand 5 minutes before pain starts. The patient constantly lays down because of pain. Patient uses no assistive walking device at this time. Patient has difficulty chores, doing yard work or shopping, socializing with friends, participating in recreational activities and exercising at this time.\par \par PRIOR PAIN TREATMENTS: Moderate relief with physical therapy and heat treatment\par \par PRIOR PAIN MEDICATIONS: Tylenol, aspirin and Motrin\par \par TODAY: Patient presents today for a revisit encounter. He is under our care for chronic lumbar and cervical pain with radiculopathy which he is receiving continuing active treatment for. He states his lower back pain is worse rating it a 7-8/10 stating he has a hard time walking up stairs or ambulating for long periods.  He has a hard time getting up from seated position due to pain and stiffness.  He states the pain is across his back but radiates down the R leg into his knee.. He has completed two months of physical therapy in December 2022 and March 2023 which provided him with minimal relief. \par \par \par As for his neck pain,   He currently rates his neck pain a 6/10 and states he feels stiffness with ROM and paraesthesias that radiate into his upper extremities.\par \par Covid 19 - This in-office encounter has occurred during a Public Health Emergency (PHE). As required by law, due to the risk of respiratory-transmitted infectious diseases, our office provided additional materials, supplies and clinical staff to assist in keeping our patients, physicians and office staff safe during this health emergency.

## 2023-04-06 ENCOUNTER — APPOINTMENT (OUTPATIENT)
Dept: PAIN MANAGEMENT | Facility: CLINIC | Age: 68
End: 2023-04-06
Payer: MEDICARE

## 2023-04-06 PROCEDURE — 93040 RHYTHM ECG WITH REPORT: CPT | Mod: 79

## 2023-04-06 PROCEDURE — 93770 DETERMINATION VENOUS PRESS: CPT

## 2023-04-06 PROCEDURE — 64484 NJX AA&/STRD TFRM EPI L/S EA: CPT | Mod: RT

## 2023-04-06 PROCEDURE — 94761 N-INVAS EAR/PLS OXIMETRY MLT: CPT

## 2023-04-06 PROCEDURE — 64483 NJX AA&/STRD TFRM EPI L/S 1: CPT | Mod: RT

## 2023-04-06 PROCEDURE — 00630 ANES PX LUMBAR REGION NOS: CPT | Mod: QZ,P2

## 2023-04-06 NOTE — PROCEDURE
[FreeTextEntry3] : SELECTIVE TRANSFORAMINAL LUMBAR EPIDURAL NERVE ROOT INJECTION UNDER FLUOROSCOPY\par \par Preoperative Diagnosis: Lumbar Radiculopathy\par Postoperative Diagnosis: Same\par Procedure: Selective Right  L3-4 and L4-5  Transforaminal Lumbar Epidural Nerve Root Injection under Fluoroscopy\par Physician: Ruben Luo D.O.\par Anesthesiologist/CRNA: Cady\par Anesthesia: See nurses notes/MAC/Cold spray\par Medical Necessity: Failure of conservative management.\par Indication for Fluoroscopy: This procedure requires the precise placement of the spinal needle into the specific paravertebral foramen. It is the only way to accurately and safely perform the injection.\par Consent: The possible complications including infection, bleeding, nerve damage, Hospital admission, death or failure of the procedure; though unusual, are theoretically possible. The patient was educated about the of the procedure and alternative therapies. All questions were answered and the patient freely gave consent to proceed.\par Monitoring: Patient had continuous blood pressure, EKG, and pulse oximetry throughout the case. See nurse's notes.\par PROCEDURE NOTE: \par After obtaining written consent, the patient was placed on the fluoroscopic table in the prone position with the pillow placed under the hips. The lumbar area was prepped with betadine solution and draped in the usual manner. A time out was performed. Cold spray was used to localize the area. The fluoroscope was used to identify the L2///L3///L4 vertebral body on the AP projection. It was then rotated into an oblique projection until the superior articulating process of the L4 (inferior) vertebra is projected beneath the 6 o'clock position of the L3 (superior) vertebrae. The 22 gauge 3 1/2 or 5inch needle was inserted in the skin at a point overlying the superior articulating process of the inferior vertebra and aimed for the 6 o'clock position of the superior vertebrae's pedicle. After the needle contacted bone, a lateral projection was obtained to insure that the needle tip was in proximity with the vertebral body. Paresthesias were not noted. One ml of Omnipaque 240 was injected and a neurogram was obtained. Following demonstration of the neurogram, 1 1/2 ml of Preservative free normal saline and 1/2 ml of Methylprednisolone (40mg) was injected. The small volume and relatively high concentration was chosen to preserve selectivity and diagnostic value of the injection. There was no CSF or heme identified. The L4-Z8ykqks was injected in identical fashion. There were no signs of, intravascular block or hypotension. The needle was removed intact. A band aid was place on the site.\par Epidurogram: The nerve root was observed in its outline on the neurogram. Distal and proximal spread was noted pointing away from epidural fibrosis/scarring.\par \par Complications: None. The patient tolerated the procedure well. \par Disposition: I have examined the patient and there are no new physical findings since the original presentation. Sensory and motor function were intact. The patient met discharge criteria see nurses notes. The discharge instruction sheet was reviewed and given to the patient. The patient was discharged home with a .\par If patient gets sustained relief will have patient do modified planks 3 times a day on carpet or yoga mat starting at 5 seconds building up to 1 minute without grimacing/Valsalva and walking. \par Comment: 1st SNRI today, depending on effectiveness would schedule 2nd SNRI in 1-2 weeks vs caudal epidural steroid vs follow up in office depending on insurances. Follow up office. Call if any problems\par \par Ruben Luo D.O.\par Diplomat, American Board of Anesthesiology\par Diplomat, American Board of Pain Medicine\par Diplomat, American Board of Pain Management\par \par

## 2023-04-25 ENCOUNTER — APPOINTMENT (OUTPATIENT)
Dept: PAIN MANAGEMENT | Facility: CLINIC | Age: 68
End: 2023-04-25
Payer: MEDICARE

## 2023-04-25 PROCEDURE — 99214 OFFICE O/P EST MOD 30 MIN: CPT

## 2023-04-25 NOTE — DATA REVIEWED
[FreeTextEntry1] : IMPRESSION: Straightening of sagittal lordosis. Mild multilevel spondylosis. 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 Bilateral S1 lateral recess stenosis and bilateral L5-S1 foraminal stenosis. L4-5 moderate spinal and foraminal stenosis. L3-4 asymmetric disc bulge impinges on the right foramen. Multiple partially visualized left renal cysts measuring up to 4 cm in diameter.

## 2023-04-25 NOTE — HISTORY OF PRESENT ILLNESS
[FreeTextEntry1] : HISTORY OF PRESENT ILLNESS: This is a 67 year old man complaining of left-sided lower back and bilateral neck pain. Patient notes that pain in his lower back is the worst area of pain. The patient has had this pain for 4-5 months. The pain has worsened in the last 3-2 months. The pain started after no specific injury or event. Patient describes pain as moderate to severe. During the last month the pain has been nearly constant with symptoms worsening in no typical pattern. Pain described as burning, pressure-like, and cramping. Pain is associated with numbness and pins and needles into the upper and lower extremities. Patient has weakness in the lower extremities. Pain is increased with standing, walking, exercise, coughing/sneezing and bowel movements. Pain is decreased with lying down and sitting. Pain is not changed with relaxation. Bowel or bladder habits have changed as the patient notes using the bathroom is pain on both urination and bowel movements.\par \par ACTIVITIES: Patient could walk about 3 minutes before the pain starts. Patient can sit 1 hour before pain starts. Patient can stand 5 minutes before pain starts. The patient constantly lays down because of pain. Patient uses no assistive walking device at this time. Patient has difficulty chores, doing yard work or shopping, socializing with friends, participating in recreational activities and exercising at this time.\par \par PRIOR PAIN TREATMENTS: Moderate relief with physical therapy and heat treatment\par \par PRIOR PAIN MEDICATIONS: Tylenol, aspirin and Motrin\par \par TODAY: Patient presents today for a revisit encounter. He is under our care for chronic lumbar and cervical pain with radiculopathy which he is receiving continuing active treatment for. He underwent a right L3-4 and L4-5 transforaminal lumbar epidural steroid injection on 4/6/23 with 50% relief. He reports his back pain and right leg radiculopathy is not as severe. He is able to sit comfortably. With walking his pain increases. He has undergone PT in the past with minimal relief. He has been applying IcyHot to his lower back region. We discussed undergoing a second right L3-4 and L4-5 transforaminal lumbar epidural steroid injection for added relief, which he wishes to proceed with.\par \par

## 2023-04-25 NOTE — ASSESSMENT
[FreeTextEntry1] : 67 year old male with chronic lumbar and cervical pain along with radiculopathy.  He underwent a R L3-L5 transforaminal lumbar epidural steroid injection with 50% relief. He will undergo a second injection. I have prescribed him Tizanidine 4 mg PO qhs PRN. I will see him back after he undergoes his second epidural injection.\par \par Patient had a MRI that shows a radicular component along with pain referred into the lower extremity. Patient has trialed rehab (Home exercise, physical therapy or chiropractic care) and medications I will schedule a right L3-L5 SNRI w/ MAC. \par \par Risk, benefits, pros and cons of procedure were explained to the patient using models and diagrams and their questions were answered. \par \par The patient has severe anxiety of procedures that necessitates monitored anesthesia care (MAC). The procedure performed will be close to major nerves, arteries, and spinal cord and/or joint structures. Due to the proximity of these structures, we need the patient to be still during the procedure. With the help of MAC, this will be safely achieved and decrease the risk of any complications.\par \par Eugenia Fernandez PA-C\par Ruben Luo DO\par \par \par

## 2023-05-09 ENCOUNTER — APPOINTMENT (OUTPATIENT)
Dept: PAIN MANAGEMENT | Facility: CLINIC | Age: 68
End: 2023-05-09
Payer: MEDICARE

## 2023-05-09 PROCEDURE — 93040 RHYTHM ECG WITH REPORT: CPT | Mod: 79

## 2023-05-09 PROCEDURE — 64483 NJX AA&/STRD TFRM EPI L/S 1: CPT | Mod: RT

## 2023-05-09 PROCEDURE — 94761 N-INVAS EAR/PLS OXIMETRY MLT: CPT

## 2023-05-09 PROCEDURE — 00630 ANES PX LUMBAR REGION NOS: CPT | Mod: QZ,P3,59

## 2023-05-09 PROCEDURE — 93770 DETERMINATION VENOUS PRESS: CPT

## 2023-05-09 PROCEDURE — 64484 NJX AA&/STRD TFRM EPI L/S EA: CPT | Mod: RT

## 2023-05-11 NOTE — PROCEDURE
[FreeTextEntry1] : SELECTIVE TRANSFORAMINAL LUMBAR EPIDURAL NERVE ROOT INJECTION UNDER FLUOROSCOPY  [FreeTextEntry3] : SELECTIVE TRANSFORAMINAL LUMBAR EPIDURAL NERVE ROOT INJECTION UNDER FLUOROSCOPY\par \par Preoperative Diagnosis: Lumbar Radiculopathy\par Procedure: Selective Right L3-4, L4-5,  Transforaminal Lumbar Epidural Nerve Root Injection under Fluoroscopy\par Physician: Ruben Luo D.O.\par Anesthesiologist/CRNA: \par Anesthesia: See nurses notes/MAC/Cold spray, Versed 2mg, Fentanyl 100 mcg, Zofran 4mg\par Medical Necessity: Failure of conservative management.\par Indication for Fluoroscopy: This procedure requires the precise placement of the spinal needle into the specific paravertebral foramen. It is the only way to accurately and safely perform the injection.\par Consent: The possible complications including infection, bleeding, nerve damage, Hospital admission, death or failure of the procedure; though unusual, are theoretically possible. The patient was educated about the of the procedure and alternative therapies. All questions were answered and the patient freely gave consent to proceed.\par Monitoring: Patient had continuous blood pressure, EKG, and pulse oximetry throughout the case. See nurse's notes.\par PROCEDURE NOTE: \par After obtaining written consent, the patient was placed on the fluoroscopic table in the prone position with the pillow placed under the hips. The lumbar area was prepped with betadine solution and draped in the usual manner. A time out was performed. Cold spray was used to localize the area. The fluoroscope was used to identify the L2///L3///L4 vertebral body on the AP projection. It was then rotated into an oblique projection until the superior articulating process of the L5 (inferior) vertebra is projected beneath the 6 o'clock position of the L4 (superior) vertebrae. The 22 gauge 3 1/2 or 5inch needle was inserted in the skin at a point overlying the superior articulating process of the inferior vertebra and aimed for the 6 o'clock position of the superior vertebrae's pedicle. After the needle contacted bone, a lateral projection was obtained to insure that the needle tip was in proximity with the vertebral body. Paresthesias were not noted. One ml of Omnipaque 240 was injected and a neurogram was obtained. Following demonstration of the neurogram, 1 1/2 ml of Preservative free normal saline and 1/2 ml of Methylprednisolone (40mg) was injected. The small volume and relatively high concentration was chosen to preserve selectivity and diagnostic value of the injection. There was no CSF or heme identified. The L3 -L4  level was injected in identical fashion. There were no signs of, intravascular block or hypotension. The needle was removed intact. A band aid was place on the site.\par Epidurogram: The nerve root was observed in its outline on the neurogram. Distal and proximal spread was noted pointing away from epidural fibrosis/scarring.\par Findings: Lumbar spine AP and oblique views with x-ray no degenerative changes noted.\par Complications: None. The patient tolerated the procedure well. \par Disposition: I have examined the patient and there are no new physical findings since the original presentation. Sensory and motor function were intact. The patient met discharge criteria see nurses notes. The discharge instruction sheet was reviewed and given to the patient. The patient was discharged home with a .\par If patient gets sustained relief will have patient do modified planks 3 times a day on carpet or yoga mat starting at 5 seconds building up to 1 minute without grimacing/Valsalva and walking. \par Comment: 1st SNRI today, depending on effectiveness would schedule 2nd SNRI in 1-2 weeks vs caudal epidural steroid vs follow up in office depending on insurances. Follow up office. Call if any problems\par This document was electronically signed by: \par Ruben Luo D.O.\par Diplomat, American Board of Anesthesiology\par Diplomat, American Board of Pain Medicine\par Diplomat, American Board of Pain Management\par \par

## 2023-05-23 ENCOUNTER — APPOINTMENT (OUTPATIENT)
Dept: PAIN MANAGEMENT | Facility: CLINIC | Age: 68
End: 2023-05-23

## 2023-06-20 ENCOUNTER — APPOINTMENT (OUTPATIENT)
Dept: PAIN MANAGEMENT | Facility: CLINIC | Age: 68
End: 2023-06-20
Payer: MEDICARE

## 2023-06-20 VITALS
DIASTOLIC BLOOD PRESSURE: 83 MMHG | WEIGHT: 180 LBS | HEIGHT: 67 IN | BODY MASS INDEX: 28.25 KG/M2 | HEART RATE: 79 BPM | SYSTOLIC BLOOD PRESSURE: 152 MMHG

## 2023-06-20 PROCEDURE — 99213 OFFICE O/P EST LOW 20 MIN: CPT

## 2023-06-20 NOTE — HISTORY OF PRESENT ILLNESS
Sheath #1: Presents as clean, dry, & intact, no bleeding and no hematoma. [FreeTextEntry1] : HISTORY OF PRESENT ILLNESS: This is a 67 year old man complaining of left-sided lower back and bilateral neck pain. Patient notes that pain in his lower back is the worst area of pain. The patient has had this pain for 4-5 months. The pain has worsened in the last 3-2 months. The pain started after no specific injury or event. Patient describes pain as moderate to severe. During the last month the pain has been nearly constant with symptoms worsening in no typical pattern. Pain described as burning, pressure-like, and cramping. Pain is associated with numbness and pins and needles into the upper and lower extremities. Patient has weakness in the lower extremities. Pain is increased with standing, walking, exercise, coughing/sneezing and bowel movements. Pain is decreased with lying down and sitting. Pain is not changed with relaxation. Bowel or bladder habits have changed as the patient notes using the bathroom is pain on both urination and bowel movements.\par \par ACTIVITIES: Patient could walk about 3 minutes before the pain starts. Patient can sit 1 hour before pain starts. Patient can stand 5 minutes before pain starts. The patient constantly lays down because of pain. Patient uses no assistive walking device at this time. Patient has difficulty chores, doing yard work or shopping, socializing with friends, participating in recreational activities and exercising at this time.\par \par PRIOR PAIN TREATMENTS: Moderate relief with physical therapy and heat treatment\par \par PRIOR PAIN MEDICATIONS: Tylenol, aspirin and Motrin\par \par TODAY: Patient presents today for a revisit encounter. He is under our care for chronic lumbar and cervical pain with radiculopathy which he is receiving continuing active treatment for. He underwent a right L3-4 and L4-5 transforaminal lumbar epidural steroid injection on 4/6/23 with 50% relief. He reports his back pain and right leg radiculopathy is not as severe. He is able to sit comfortably. With walking his pain increases. He has undergone PT in the past with minimal relief. Patient is status post second right L3-4 and L4-5 transforaminal lumbar epidural steroid injection on 5/9/23 which provided him with an additional 50% relief. \par \par Today, he has a new complaint of rectal pain. He is under the care of a GI specialist as well as his PCP. \par

## 2023-06-20 NOTE — ASSESSMENT
[FreeTextEntry1] : 67 year old male with chronic lumbar and cervical pain along with radiculopathy.  He underwent a R L3-L5 transforaminal lumbar epidural steroid injection with 50% relief. Patient is status post second right L3-4 and L4-5 transforaminal lumbar epidural steroid injection on 5/9/23 which provided him with an additional 50% relief. \par \par At this time, the patient complains of rectal pain. He has been under the care of his GI specialist and PCP for this issue. At this time I recommend a coccyx injection with fluoroscopic guidance. Patient would like to proceed. \par \par Risk, benefits, pros and cons of procedure were explained to the patient using models and diagrams and their questions were answered. \par \par I, Libertad Kirby, attest that this documentation has been prepared under the direction and in the presence of Provider Ruben Luo DO\par The documentation recorded by the scribe, in my presence, accurately reflects the service I personally performed, and the decisions made by me with my edits as appropriate.\par \par Best Regards, \par Ruben Luo, D.O. \par Diplomat, American Board of Anesthesiology \par Diplomat, American Board of Pain Medicine \par Diplomat, American Board of Pain Management \par \par

## 2023-07-12 ENCOUNTER — EMERGENCY (EMERGENCY)
Facility: HOSPITAL | Age: 68
LOS: 0 days | Discharge: HOME | End: 2023-07-12
Attending: EMERGENCY MEDICINE
Payer: MEDICARE

## 2023-07-12 VITALS
WEIGHT: 200.62 LBS | DIASTOLIC BLOOD PRESSURE: 84 MMHG | SYSTOLIC BLOOD PRESSURE: 168 MMHG | TEMPERATURE: 98 F | OXYGEN SATURATION: 99 % | HEART RATE: 91 BPM | HEIGHT: 66.14 IN | RESPIRATION RATE: 18 BRPM

## 2023-07-12 DIAGNOSIS — Z95.5 PRESENCE OF CORONARY ANGIOPLASTY IMPLANT AND GRAFT: ICD-10-CM

## 2023-07-12 DIAGNOSIS — I25.10 ATHEROSCLEROTIC HEART DISEASE OF NATIVE CORONARY ARTERY WITHOUT ANGINA PECTORIS: ICD-10-CM

## 2023-07-12 DIAGNOSIS — I10 ESSENTIAL (PRIMARY) HYPERTENSION: ICD-10-CM

## 2023-07-12 DIAGNOSIS — Z98.890 OTHER SPECIFIED POSTPROCEDURAL STATES: Chronic | ICD-10-CM

## 2023-07-12 DIAGNOSIS — L03.011 CELLULITIS OF RIGHT FINGER: ICD-10-CM

## 2023-07-12 DIAGNOSIS — M79.644 PAIN IN RIGHT FINGER(S): ICD-10-CM

## 2023-07-12 DIAGNOSIS — Z79.82 LONG TERM (CURRENT) USE OF ASPIRIN: ICD-10-CM

## 2023-07-12 DIAGNOSIS — E78.00 PURE HYPERCHOLESTEROLEMIA, UNSPECIFIED: ICD-10-CM

## 2023-07-12 DIAGNOSIS — E11.9 TYPE 2 DIABETES MELLITUS WITHOUT COMPLICATIONS: ICD-10-CM

## 2023-07-12 DIAGNOSIS — Z79.84 LONG TERM (CURRENT) USE OF ORAL HYPOGLYCEMIC DRUGS: ICD-10-CM

## 2023-07-12 PROCEDURE — 99284 EMERGENCY DEPT VISIT MOD MDM: CPT | Mod: 25

## 2023-07-12 PROCEDURE — 82962 GLUCOSE BLOOD TEST: CPT

## 2023-07-12 PROCEDURE — 10060 I&D ABSCESS SIMPLE/SINGLE: CPT

## 2023-07-12 PROCEDURE — 99283 EMERGENCY DEPT VISIT LOW MDM: CPT | Mod: 25

## 2023-07-12 PROCEDURE — 11740 EVACUATION SUBUNGUAL HMTMA: CPT

## 2023-07-12 RX ADMIN — Medication 1 TABLET(S): at 01:38

## 2023-07-12 NOTE — ED PROVIDER NOTE - CARE PLAN
1 Principal Discharge DX:	Paronychia, finger, right  Secondary Diagnosis:	Cellulitis of right finger

## 2023-07-12 NOTE — ED PROVIDER NOTE - OBJECTIVE STATEMENT
68 years old male history of diabetes, CAD status post CABG, hypertension and high cholesterol present complaint right second finger pain and swelling worsening over the past 2 days.  Feeling pain radiating down to his right forearm so he comes to ED for evaluation.  Patient otherwise denies fever, chills, bleeding or drainage from his finger, numbness and weakness to his right hand.

## 2023-07-12 NOTE — ED PROVIDER NOTE - ATTENDING APP SHARED VISIT CONTRIBUTION OF CARE
68 years old male history of diabetes, CAD status post CABG, hypertension and high cholesterol present c/o right second finger pain and swelling x 2 days.  Feeling pain radiating down to his right forearm so he comes to ED for evaluation.  Patient otherwise denies fever, chills, bleeding or drainage from his finger, numbness and weakness to his right hand.    On exam +rt 2nd finger paronychia with erythema down finger to second knuckle (noncircumferential). NO tenosynovitis.  Remainder of exam per PA note. Agree with PA management of drainage of paronychia, placement on abx and recommend wound check in 48 hours in ER or at PMD. Return precautions and wound care instructions provided.

## 2023-07-12 NOTE — ED ADULT TRIAGE NOTE - CHIEF COMPLAINT QUOTE
I have problem with this (right index finger nail bed inflammation) and I have chills now. Yesterday the pain only in finger, now the pain is going up the whole arm 'm diabetic - patient  Inflammation ti first knuckle, negative obvious cellulitis or streaking noted. Temperature taken twice with different thermometers. Patient denies drug allergy but has two meds in allergy from prior chart

## 2023-07-12 NOTE — ED PROVIDER NOTE - PHYSICAL EXAMINATION
CONSTITUTIONAL: Well-appearing; well-nourished; in no apparent distress.   EYES: PERRL; EOM intact.   MS: Right hand and fingers with full range of motion.  Right hand neurovascular intact.  No right axillary lymph node noted.  SKIN: Erythematous, softness and tenderness to right second finger paronychial fold consistent with paronychia.  Surrounding erythema radiating down to right second MCP joint also aspect.  No circumferential erythema noted.  No red streaking.  NEURO/PSYCH: A & O x 4; grossly unremarkable.

## 2023-07-12 NOTE — ED PROVIDER NOTE - NSFOLLOWUPINSTRUCTIONS_ED_ALL_ED_FT
Paronychia    Please return to ED or your primary care provider in 2 days for wound check    Paronychia is an infection of the skin that surrounds a nail. It usually affects the skin around a fingernail, but it may also occur near a toenail. It often causes pain and swelling around the nail. In some cases, a collection of pus (abscess) can form near or under the nail.     This condition may develop suddenly, or it may develop gradually over a longer period. In most cases, paronychia is not serious, and it will clear up with treatment.    What are the causes?  This condition may be caused by bacteria or a fungus. These germs can enter the body through an opening in the skin, such as a cut or a hangnail.    What increases the risk?  This condition is more likely to develop in people who:  Get their hands wet often, such as those who work as dishwashers, , or nurses.  Bite their fingernails or suck their thumbs.  Trim their nails very short.  Have hangnails or injured fingertips.  Get manicures.  Have diabetes.    What are the signs or symptoms?  Symptoms of this condition include:  Redness and swelling of the skin near the nail.  Tenderness around the nail when you touch the area.  Pus-filled bumps under the skin at the base and sides of the nail (cuticle).  Fluid or pus under the nail.  Throbbing pain in the area.  How is this diagnosed?  This condition is diagnosed with a physical exam. In some cases, a sample of pus may be tested to determine what type of bacteria or fungus is causing the condition.    How is this treated?  Treatment depends on the cause and severity of your condition. If your condition is mild, it may clear up on its own in a few days or after soaking in warm water. If needed, treatment may include:  Antibiotic medicine, if your infection is caused by bacteria.  Antifungal medicine, if your infection is caused by a fungus.  A procedure to drain pus from an abscess.  Anti-inflammatory medicine (corticosteroids).  Follow these instructions at home:  Wound care     Keep the affected area clean.  Soak the affected area in warm water, if told to do so by your health care provider. You may be told to do this for 20 minutes, 2–3 times a day.   Keep the area dry when you are not soaking it.  Do not try to drain an abscess yourself.  Follow instructions from your health care provider about how to take care of the affected area. Make sure you:  Wash your hands with soap and water before you change your bandage (dressing). If soap and water are not available, use hand .  Change your dressing as told by your health care provider.  If you had an abscess drained, check the area every day for signs of infection. Check for:  Redness, swelling, or pain.  Fluid or blood.  Warmth.  Pus or a bad smell.  Medicines       Take over-the-counter and prescription medicines only as told by your health care provider.  If you were prescribed an antibiotic medicine, take it as told by your health care provider. Do not stop taking the antibiotic even if you start to feel better.  General instructions     Avoid contact with harsh chemicals.  Do not pick at the affected area.  Prevention     To prevent this condition from happening again:  Wear rubber gloves when washing dishes or doing other tasks that require your hands to get wet.  Wear gloves if your hands might come in contact with  or other chemicals.  Avoid injuring your nails or fingertips.  Do not bite your nails or tear hangnails.  Do not cut your nails very short.   Do not cut your cuticles.  Use clean nail clippers or scissors when trimming nails.  Contact a health care provider if:  Your symptoms get worse or do not improve with treatment.  You have continued or increased fluid, blood, or pus coming from the affected area.  Your finger or knuckle becomes swollen or difficult to move.  Get help right away if you have:  A fever or chills.  Redness spreading away from the affected area.  Joint or muscle pain.    Summary  Paronychia is an infection of the skin that surrounds a nail. It often causes pain and swelling around the nail. In some cases, a collection of pus (abscess) can form near or under the nail.  This condition may be caused by bacteria or a fungus. These germs can enter the body through an opening in the skin, such as a cut or a hangnail.  If your condition is mild, it may clear up on its own in a few days. If needed, treatment may include medicine or a procedure to drain pus from an abscess.  To prevent this condition from happening again, wear gloves if doing tasks that require your hands to get wet or to come in contact with chemicals. Also avoid injuring your nails or fingertips.    Cellulitis    Cellulitis is a skin infection caused by bacteria. This condition occurs most often in the arms and lower legs but can occur anywhere over the body. Symptoms include redness, swelling, warm skin, tenderness, and chills/fever. If you were prescribed an antibiotic medicine, take it as told by your health care provider. Do not stop taking the antibiotic even if you start to feel better.    SEEK IMMEDIATE MEDICAL CARE IF YOU HAVE THE FOLLOWING SYMPTOMS: worsening fever, red streaks coming from affected area, vomiting or diarrhea, or dizziness/lightheadedness.

## 2023-07-12 NOTE — ED PROCEDURE NOTE - ATTENDING APP SHARED VISIT CONTRIBUTION OF CARE
I was present for and supervised the key/critical aspects of the procedures performed during the care of the patient.--digital block
I was present for and supervised the key/critical aspects of the procedures performed during the care of the patient.--I&D of paronychia

## 2023-07-12 NOTE — ED PROVIDER NOTE - PATIENT PORTAL LINK FT
You can access the FollowMyHealth Patient Portal offered by [facility] by registering at the following website: [email or web address]. By joining [facility]’s FollowMyHealth portal, you will also be able to view your health information using other applications (apps) compatible with our system.

## 2023-07-12 NOTE — ED PROCEDURE NOTE - NS ED ATTENDING STATEMENT MOD
This was a shared visit with the JAMAL. I reviewed and verified the documentation and independently performed the documented:
This was a shared visit with the JAMAL. I reviewed and verified the documentation and independently performed the documented:

## 2023-07-13 ENCOUNTER — APPOINTMENT (OUTPATIENT)
Dept: PAIN MANAGEMENT | Facility: CLINIC | Age: 68
End: 2023-07-13

## 2023-07-14 ENCOUNTER — INPATIENT (INPATIENT)
Facility: HOSPITAL | Age: 68
LOS: 7 days | Discharge: HOME HEALTH | DRG: 603 | End: 2023-07-22
Attending: HOSPITALIST | Admitting: INTERNAL MEDICINE
Payer: MEDICARE

## 2023-07-14 VITALS
DIASTOLIC BLOOD PRESSURE: 73 MMHG | HEIGHT: 66.14 IN | HEART RATE: 86 BPM | WEIGHT: 190.04 LBS | RESPIRATION RATE: 18 BRPM | TEMPERATURE: 97 F | SYSTOLIC BLOOD PRESSURE: 130 MMHG | OXYGEN SATURATION: 99 %

## 2023-07-14 DIAGNOSIS — L08.9 LOCAL INFECTION OF THE SKIN AND SUBCUTANEOUS TISSUE, UNSPECIFIED: ICD-10-CM

## 2023-07-14 DIAGNOSIS — Z98.890 OTHER SPECIFIED POSTPROCEDURAL STATES: Chronic | ICD-10-CM

## 2023-07-14 LAB
ALBUMIN SERPL ELPH-MCNC: 4.4 G/DL — SIGNIFICANT CHANGE UP (ref 3.5–5.2)
ALP SERPL-CCNC: 84 U/L — SIGNIFICANT CHANGE UP (ref 30–115)
ALT FLD-CCNC: 27 U/L — SIGNIFICANT CHANGE UP (ref 0–41)
ANION GAP SERPL CALC-SCNC: 13 MMOL/L — SIGNIFICANT CHANGE UP (ref 7–14)
AST SERPL-CCNC: 18 U/L — SIGNIFICANT CHANGE UP (ref 0–41)
BASOPHILS # BLD AUTO: 0.01 K/UL — SIGNIFICANT CHANGE UP (ref 0–0.2)
BASOPHILS NFR BLD AUTO: 0.1 % — SIGNIFICANT CHANGE UP (ref 0–1)
BILIRUB SERPL-MCNC: 0.3 MG/DL — SIGNIFICANT CHANGE UP (ref 0.2–1.2)
BUN SERPL-MCNC: 23 MG/DL — HIGH (ref 10–20)
CALCIUM SERPL-MCNC: 9.5 MG/DL — SIGNIFICANT CHANGE UP (ref 8.4–10.5)
CHLORIDE SERPL-SCNC: 101 MMOL/L — SIGNIFICANT CHANGE UP (ref 98–110)
CO2 SERPL-SCNC: 22 MMOL/L — SIGNIFICANT CHANGE UP (ref 17–32)
CREAT SERPL-MCNC: 1.4 MG/DL — SIGNIFICANT CHANGE UP (ref 0.7–1.5)
CRP SERPL-MCNC: 24.3 MG/L — HIGH
EGFR: 55 ML/MIN/1.73M2 — LOW
EOSINOPHIL # BLD AUTO: 0.1 K/UL — SIGNIFICANT CHANGE UP (ref 0–0.7)
EOSINOPHIL NFR BLD AUTO: 1.2 % — SIGNIFICANT CHANGE UP (ref 0–8)
ERYTHROCYTE [SEDIMENTATION RATE] IN BLOOD: 40 MM/HR — HIGH (ref 0–10)
GLUCOSE SERPL-MCNC: 193 MG/DL — HIGH (ref 70–99)
HCT VFR BLD CALC: 40.6 % — LOW (ref 42–52)
HGB BLD-MCNC: 13.1 G/DL — LOW (ref 14–18)
IMM GRANULOCYTES NFR BLD AUTO: 0.2 % — SIGNIFICANT CHANGE UP (ref 0.1–0.3)
LYMPHOCYTES # BLD AUTO: 1.89 K/UL — SIGNIFICANT CHANGE UP (ref 1.2–3.4)
LYMPHOCYTES # BLD AUTO: 22.8 % — SIGNIFICANT CHANGE UP (ref 20.5–51.1)
MCHC RBC-ENTMCNC: 28.5 PG — SIGNIFICANT CHANGE UP (ref 27–31)
MCHC RBC-ENTMCNC: 32.3 G/DL — SIGNIFICANT CHANGE UP (ref 32–37)
MCV RBC AUTO: 88.3 FL — SIGNIFICANT CHANGE UP (ref 80–94)
MONOCYTES # BLD AUTO: 0.68 K/UL — HIGH (ref 0.1–0.6)
MONOCYTES NFR BLD AUTO: 8.2 % — SIGNIFICANT CHANGE UP (ref 1.7–9.3)
NEUTROPHILS # BLD AUTO: 5.6 K/UL — SIGNIFICANT CHANGE UP (ref 1.4–6.5)
NEUTROPHILS NFR BLD AUTO: 67.5 % — SIGNIFICANT CHANGE UP (ref 42.2–75.2)
NRBC # BLD: 0 /100 WBCS — SIGNIFICANT CHANGE UP (ref 0–0)
PLATELET # BLD AUTO: 171 K/UL — SIGNIFICANT CHANGE UP (ref 130–400)
PMV BLD: 12.8 FL — HIGH (ref 7.4–10.4)
POTASSIUM SERPL-MCNC: 4.6 MMOL/L — SIGNIFICANT CHANGE UP (ref 3.5–5)
POTASSIUM SERPL-SCNC: 4.6 MMOL/L — SIGNIFICANT CHANGE UP (ref 3.5–5)
PROT SERPL-MCNC: 7 G/DL — SIGNIFICANT CHANGE UP (ref 6–8)
RBC # BLD: 4.6 M/UL — LOW (ref 4.7–6.1)
RBC # FLD: 14.6 % — HIGH (ref 11.5–14.5)
SODIUM SERPL-SCNC: 136 MMOL/L — SIGNIFICANT CHANGE UP (ref 135–146)
WBC # BLD: 8.3 K/UL — SIGNIFICANT CHANGE UP (ref 4.8–10.8)
WBC # FLD AUTO: 8.3 K/UL — SIGNIFICANT CHANGE UP (ref 4.8–10.8)

## 2023-07-14 PROCEDURE — 84100 ASSAY OF PHOSPHORUS: CPT

## 2023-07-14 PROCEDURE — 73220 MRI UPPR EXTREMITY W/O&W/DYE: CPT | Mod: RT

## 2023-07-14 PROCEDURE — 93005 ELECTROCARDIOGRAM TRACING: CPT

## 2023-07-14 PROCEDURE — 80061 LIPID PANEL: CPT

## 2023-07-14 PROCEDURE — 85610 PROTHROMBIN TIME: CPT

## 2023-07-14 PROCEDURE — 82962 GLUCOSE BLOOD TEST: CPT

## 2023-07-14 PROCEDURE — 87040 BLOOD CULTURE FOR BACTERIA: CPT

## 2023-07-14 PROCEDURE — 85730 THROMBOPLASTIN TIME PARTIAL: CPT

## 2023-07-14 PROCEDURE — 99223 1ST HOSP IP/OBS HIGH 75: CPT

## 2023-07-14 PROCEDURE — 83036 HEMOGLOBIN GLYCOSYLATED A1C: CPT

## 2023-07-14 PROCEDURE — 86140 C-REACTIVE PROTEIN: CPT

## 2023-07-14 PROCEDURE — C1751: CPT

## 2023-07-14 PROCEDURE — 99285 EMERGENCY DEPT VISIT HI MDM: CPT

## 2023-07-14 PROCEDURE — 85652 RBC SED RATE AUTOMATED: CPT

## 2023-07-14 PROCEDURE — 36415 COLL VENOUS BLD VENIPUNCTURE: CPT

## 2023-07-14 PROCEDURE — 73130 X-RAY EXAM OF HAND: CPT | Mod: 26,RT

## 2023-07-14 PROCEDURE — 87529 HSV DNA AMP PROBE: CPT

## 2023-07-14 PROCEDURE — 83735 ASSAY OF MAGNESIUM: CPT

## 2023-07-14 PROCEDURE — 80053 COMPREHEN METABOLIC PANEL: CPT

## 2023-07-14 PROCEDURE — 73201 CT UPPER EXTREMITY W/DYE: CPT | Mod: RT

## 2023-07-14 PROCEDURE — 80202 ASSAY OF VANCOMYCIN: CPT

## 2023-07-14 PROCEDURE — 73110 X-RAY EXAM OF WRIST: CPT | Mod: 26,RT

## 2023-07-14 PROCEDURE — 36573 INSJ PICC RS&I 5 YR+: CPT

## 2023-07-14 PROCEDURE — 84550 ASSAY OF BLOOD/URIC ACID: CPT

## 2023-07-14 PROCEDURE — 85025 COMPLETE CBC W/AUTO DIFF WBC: CPT

## 2023-07-14 PROCEDURE — 85027 COMPLETE CBC AUTOMATED: CPT

## 2023-07-14 PROCEDURE — 87641 MR-STAPH DNA AMP PROBE: CPT

## 2023-07-14 PROCEDURE — 87640 STAPH A DNA AMP PROBE: CPT

## 2023-07-14 PROCEDURE — A9579: CPT

## 2023-07-14 RX ORDER — SODIUM CHLORIDE 9 MG/ML
1000 INJECTION INTRAMUSCULAR; INTRAVENOUS; SUBCUTANEOUS ONCE
Refills: 0 | Status: COMPLETED | OUTPATIENT
Start: 2023-07-14 | End: 2023-07-14

## 2023-07-14 RX ORDER — KETOROLAC TROMETHAMINE 30 MG/ML
30 SYRINGE (ML) INJECTION ONCE
Refills: 0 | Status: DISCONTINUED | OUTPATIENT
Start: 2023-07-14 | End: 2023-07-14

## 2023-07-14 RX ORDER — AMPICILLIN SODIUM AND SULBACTAM SODIUM 250; 125 MG/ML; MG/ML
3 INJECTION, POWDER, FOR SUSPENSION INTRAMUSCULAR; INTRAVENOUS ONCE
Refills: 0 | Status: COMPLETED | OUTPATIENT
Start: 2023-07-14 | End: 2023-07-14

## 2023-07-14 RX ORDER — TETANUS TOXOID, REDUCED DIPHTHERIA TOXOID AND ACELLULAR PERTUSSIS VACCINE, ADSORBED 5; 2.5; 8; 8; 2.5 [IU]/.5ML; [IU]/.5ML; UG/.5ML; UG/.5ML; UG/.5ML
0.5 SUSPENSION INTRAMUSCULAR ONCE
Refills: 0 | Status: COMPLETED | OUTPATIENT
Start: 2023-07-14 | End: 2023-07-14

## 2023-07-14 RX ADMIN — TETANUS TOXOID, REDUCED DIPHTHERIA TOXOID AND ACELLULAR PERTUSSIS VACCINE, ADSORBED 0.5 MILLILITER(S): 5; 2.5; 8; 8; 2.5 SUSPENSION INTRAMUSCULAR at 20:55

## 2023-07-14 RX ADMIN — Medication 30 MILLIGRAM(S): at 23:26

## 2023-07-14 RX ADMIN — Medication 30 MILLIGRAM(S): at 23:45

## 2023-07-14 RX ADMIN — SODIUM CHLORIDE 2000 MILLILITER(S): 9 INJECTION INTRAMUSCULAR; INTRAVENOUS; SUBCUTANEOUS at 20:55

## 2023-07-14 RX ADMIN — AMPICILLIN SODIUM AND SULBACTAM SODIUM 200 GRAM(S): 250; 125 INJECTION, POWDER, FOR SUSPENSION INTRAMUSCULAR; INTRAVENOUS at 20:55

## 2023-07-14 RX ADMIN — Medication 1 TABLET(S): at 23:08

## 2023-07-14 NOTE — ED PROVIDER NOTE - OBJECTIVE STATEMENT
68  M with PMHx of  DM II on insulin, CAD s/p CABG(2015), HTN, HLD, SHx of B/L shoulder and Lt. knee replacement, presents with right index finger swelling and pain for the last few days sts that the pain has increased over th elast few days and he is having trouble making a fist. denies fever, chills, N,V,D,CP,SOB, abdominal pain

## 2023-07-14 NOTE — ED PROVIDER NOTE - PHYSICAL EXAMINATION
VITAL SIGNS: I have reviewed nursing notes and confirm.  CONSTITUTIONAL: in no acute distress.  SKIN: Skin exam is warm and dry, no acute rash. swelling to right hand predominantly around right thumb with ttp.   CARD: S1, S2 normal; no murmurs, gallops, or rubs. Regular rate and rhythm.  RESP: No wheezes, rales or rhonchi. Speaking in full sentences.   ABD:  soft; non-distended; non-tender  EXT: Normal ROM. No clubbing, cyanosis or edema.  NEURO: Alert, oriented. Grossly unremarkable. No focal deficits.

## 2023-07-14 NOTE — ED PROVIDER NOTE - PROGRESS NOTE DETAILS
I went to evaluate patient x 2, not found in assigned area. Will continue to search for patient. Patient is seen and evaluated by me. Consulted Ortho on call, as recommended, labs/x-rays ordered.

## 2023-07-14 NOTE — ED PROVIDER NOTE - ATTENDING APP SHARED VISIT CONTRIBUTION OF CARE
Patient is c/o persistent pain/swelling/redness of the Rt hand 2nd digit, and not getting better with oral abx. Denies trauma.   Denies f/c/n/v. Denies any other symptoms.   Vitals reviewed.   Rt hand exam: Rt 2nd digit has open wound on the distal phalanx on the dorsal aspect, no crepitus. +uniform swelling of the digit noted, limited ROM of the MCP/PIP/DIP joints noted. RUE is distally NVI.   A/P: Rt hand 2nd digit cellulitis/tenosynovitis,   labs, x-rays, abx,   reevaluation.   Patient was evaluated by ortho on call for hand surgery and recommended, admission to Medicine for IV abx and no surgical intervention at this time.

## 2023-07-14 NOTE — ED PROVIDER NOTE - CARE PLAN
Principal Discharge DX:	Infection of finger  Secondary Diagnosis:	Diabetes mellitus  Secondary Diagnosis:	CAD (coronary artery disease)   1

## 2023-07-14 NOTE — CONSULT NOTE ADULT - ASSESSMENT
ORTHOPAEDIC SURGERY CONSULT NOTE  REASON FOR CONSULT: R IF PARONYCHIA    68M PMH DM, CAD s/p CABG, HTN, HLD presents with right IF acute paronychia. Patient intially seen on 7/12/2023 in ED when he underwent bedside I&D by ED with blood and purulent drainage. Patient returned to ED today, because pain and swelling of his right IF was not improving. Patient states he was taking his PO antibiotics. Patient continues to complain of right IF pain and swelling. Denies f/c/n/v/sob/chest pain. Denies numbness and tingling    PE  NAD  Breathing comfortably on RA    R IF  Skin intact  5 mm healing, closed incisional wound over proximal nail fold  Swelling and erythema of proximal nail fold present  No fluctuance present  No drainage noted  No pus noted under nail  TTP over proximal nail fold  DIP/PIP flexion/extension intact  Radial pulse 2+, finger wwp    XR  No fractures or dislocations    WBC 8.3k  CRP 24    A/P  68M with acute right IF paronychia    -No indication for I&D at this time given no fluctuance present  -Warm soapy soaks TID  -Please obtain baseline HgA1c  -Trend CRP/ESR/WBC  -IV antibiotics

## 2023-07-14 NOTE — ED PROVIDER NOTE - DIFFERENTIAL DIAGNOSIS
Differential Diagnosis Electrolyte abnormalities, dehydration, AMBER, hyperglycemia, hypoglycemia, symptomatic anemia.  r/o fracture, r/o dislocation.

## 2023-07-14 NOTE — ED PROVIDER NOTE - CLINICAL SUMMARY MEDICAL DECISION MAKING FREE TEXT BOX
Laboratory results reviewed and discussed with patient. +Elevated ESR/CRP, +mildly elevated blood glucose levels and Bun/Cr.   X-rays showed-- no Fx, no dislocation and no FB noted.   Patient remained hemodynamically stable during the course of ED stay. Discussed with patient about the results of the diagnostic studies. Discussed with admitting physician and MAR, patient is admitted to Medicine for further evaluation and care.

## 2023-07-14 NOTE — ED ADULT NURSE NOTE - NSFALLUNIVINTERV_ED_ALL_ED
Bed/Stretcher in lowest position, wheels locked, appropriate side rails in place/Call bell, personal items and telephone in reach/Instruct patient to call for assistance before getting out of bed/chair/stretcher/Non-slip footwear applied when patient is off stretcher/[place] to call system/Physically safe environment - no spills, clutter or unnecessary equipment/Purposeful proactive rounding/Room/bathroom lighting operational, light cord in reach

## 2023-07-15 LAB
A1C WITH ESTIMATED AVERAGE GLUCOSE RESULT: 8.2 % — HIGH (ref 4–5.6)
ALBUMIN SERPL ELPH-MCNC: 3.8 G/DL — SIGNIFICANT CHANGE UP (ref 3.5–5.2)
ALP SERPL-CCNC: 80 U/L — SIGNIFICANT CHANGE UP (ref 30–115)
ALT FLD-CCNC: 23 U/L — SIGNIFICANT CHANGE UP (ref 0–41)
ANION GAP SERPL CALC-SCNC: 11 MMOL/L — SIGNIFICANT CHANGE UP (ref 7–14)
APTT BLD: 29.5 SEC — SIGNIFICANT CHANGE UP (ref 27–39.2)
AST SERPL-CCNC: 16 U/L — SIGNIFICANT CHANGE UP (ref 0–41)
BASOPHILS # BLD AUTO: 0.01 K/UL — SIGNIFICANT CHANGE UP (ref 0–0.2)
BASOPHILS NFR BLD AUTO: 0.1 % — SIGNIFICANT CHANGE UP (ref 0–1)
BILIRUB SERPL-MCNC: 0.3 MG/DL — SIGNIFICANT CHANGE UP (ref 0.2–1.2)
BUN SERPL-MCNC: 18 MG/DL — SIGNIFICANT CHANGE UP (ref 10–20)
CALCIUM SERPL-MCNC: 8.7 MG/DL — SIGNIFICANT CHANGE UP (ref 8.4–10.5)
CHLORIDE SERPL-SCNC: 106 MMOL/L — SIGNIFICANT CHANGE UP (ref 98–110)
CHOLEST SERPL-MCNC: 135 MG/DL — SIGNIFICANT CHANGE UP
CO2 SERPL-SCNC: 21 MMOL/L — SIGNIFICANT CHANGE UP (ref 17–32)
CREAT SERPL-MCNC: 1.1 MG/DL — SIGNIFICANT CHANGE UP (ref 0.7–1.5)
CRP SERPL-MCNC: 18.6 MG/L — HIGH
EGFR: 74 ML/MIN/1.73M2 — SIGNIFICANT CHANGE UP
EOSINOPHIL # BLD AUTO: 0.18 K/UL — SIGNIFICANT CHANGE UP (ref 0–0.7)
EOSINOPHIL NFR BLD AUTO: 2.5 % — SIGNIFICANT CHANGE UP (ref 0–8)
ERYTHROCYTE [SEDIMENTATION RATE] IN BLOOD: 37 MM/HR — HIGH (ref 0–10)
ESTIMATED AVERAGE GLUCOSE: 189 MG/DL — HIGH (ref 68–114)
GLUCOSE BLDC GLUCOMTR-MCNC: 174 MG/DL — HIGH (ref 70–99)
GLUCOSE BLDC GLUCOMTR-MCNC: 194 MG/DL — HIGH (ref 70–99)
GLUCOSE BLDC GLUCOMTR-MCNC: 201 MG/DL — HIGH (ref 70–99)
GLUCOSE BLDC GLUCOMTR-MCNC: 222 MG/DL — HIGH (ref 70–99)
GLUCOSE BLDC GLUCOMTR-MCNC: 61 MG/DL — LOW (ref 70–99)
GLUCOSE BLDC GLUCOMTR-MCNC: 85 MG/DL — SIGNIFICANT CHANGE UP (ref 70–99)
GLUCOSE SERPL-MCNC: 224 MG/DL — HIGH (ref 70–99)
HCT VFR BLD CALC: 41.6 % — LOW (ref 42–52)
HDLC SERPL-MCNC: 38 MG/DL — LOW
HGB BLD-MCNC: 13.8 G/DL — LOW (ref 14–18)
IMM GRANULOCYTES NFR BLD AUTO: 0.1 % — SIGNIFICANT CHANGE UP (ref 0.1–0.3)
INR BLD: 0.96 RATIO — SIGNIFICANT CHANGE UP (ref 0.65–1.3)
LIPID PNL WITH DIRECT LDL SERPL: 57 MG/DL — SIGNIFICANT CHANGE UP
LYMPHOCYTES # BLD AUTO: 2.16 K/UL — SIGNIFICANT CHANGE UP (ref 1.2–3.4)
LYMPHOCYTES # BLD AUTO: 30 % — SIGNIFICANT CHANGE UP (ref 20.5–51.1)
MCHC RBC-ENTMCNC: 29.2 PG — SIGNIFICANT CHANGE UP (ref 27–31)
MCHC RBC-ENTMCNC: 33.2 G/DL — SIGNIFICANT CHANGE UP (ref 32–37)
MCV RBC AUTO: 88.1 FL — SIGNIFICANT CHANGE UP (ref 80–94)
MONOCYTES # BLD AUTO: 0.73 K/UL — HIGH (ref 0.1–0.6)
MONOCYTES NFR BLD AUTO: 10.1 % — HIGH (ref 1.7–9.3)
NEUTROPHILS # BLD AUTO: 4.11 K/UL — SIGNIFICANT CHANGE UP (ref 1.4–6.5)
NEUTROPHILS NFR BLD AUTO: 57.2 % — SIGNIFICANT CHANGE UP (ref 42.2–75.2)
NON HDL CHOLESTEROL: 97 MG/DL — SIGNIFICANT CHANGE UP
NRBC # BLD: 0 /100 WBCS — SIGNIFICANT CHANGE UP (ref 0–0)
PLATELET # BLD AUTO: 158 K/UL — SIGNIFICANT CHANGE UP (ref 130–400)
PMV BLD: 12.5 FL — HIGH (ref 7.4–10.4)
POTASSIUM SERPL-MCNC: 4.2 MMOL/L — SIGNIFICANT CHANGE UP (ref 3.5–5)
POTASSIUM SERPL-SCNC: 4.2 MMOL/L — SIGNIFICANT CHANGE UP (ref 3.5–5)
PROT SERPL-MCNC: 6.3 G/DL — SIGNIFICANT CHANGE UP (ref 6–8)
PROTHROM AB SERPL-ACNC: 10.9 SEC — SIGNIFICANT CHANGE UP (ref 9.95–12.87)
RBC # BLD: 4.72 M/UL — SIGNIFICANT CHANGE UP (ref 4.7–6.1)
RBC # FLD: 14.5 % — SIGNIFICANT CHANGE UP (ref 11.5–14.5)
SODIUM SERPL-SCNC: 138 MMOL/L — SIGNIFICANT CHANGE UP (ref 135–146)
TRIGL SERPL-MCNC: 203 MG/DL — HIGH
WBC # BLD: 7.2 K/UL — SIGNIFICANT CHANGE UP (ref 4.8–10.8)
WBC # FLD AUTO: 7.2 K/UL — SIGNIFICANT CHANGE UP (ref 4.8–10.8)

## 2023-07-15 PROCEDURE — 93010 ELECTROCARDIOGRAM REPORT: CPT

## 2023-07-15 PROCEDURE — 73201 CT UPPER EXTREMITY W/DYE: CPT | Mod: 26,RT

## 2023-07-15 PROCEDURE — 99222 1ST HOSP IP/OBS MODERATE 55: CPT | Mod: GC

## 2023-07-15 RX ORDER — ASPIRIN/CALCIUM CARB/MAGNESIUM 324 MG
81 TABLET ORAL DAILY
Refills: 0 | Status: DISCONTINUED | OUTPATIENT
Start: 2023-07-15 | End: 2023-07-22

## 2023-07-15 RX ORDER — ACETAMINOPHEN 500 MG
650 TABLET ORAL EVERY 6 HOURS
Refills: 0 | Status: DISCONTINUED | OUTPATIENT
Start: 2023-07-15 | End: 2023-07-22

## 2023-07-15 RX ORDER — DEXTROSE 50 % IN WATER 50 %
12.5 SYRINGE (ML) INTRAVENOUS ONCE
Refills: 0 | Status: DISCONTINUED | OUTPATIENT
Start: 2023-07-15 | End: 2023-07-22

## 2023-07-15 RX ORDER — ENOXAPARIN SODIUM 100 MG/ML
40 INJECTION SUBCUTANEOUS EVERY 24 HOURS
Refills: 0 | Status: DISCONTINUED | OUTPATIENT
Start: 2023-07-15 | End: 2023-07-22

## 2023-07-15 RX ORDER — LANOLIN ALCOHOL/MO/W.PET/CERES
3 CREAM (GRAM) TOPICAL AT BEDTIME
Refills: 0 | Status: DISCONTINUED | OUTPATIENT
Start: 2023-07-15 | End: 2023-07-22

## 2023-07-15 RX ORDER — TAMSULOSIN HYDROCHLORIDE 0.4 MG/1
0.4 CAPSULE ORAL AT BEDTIME
Refills: 0 | Status: DISCONTINUED | OUTPATIENT
Start: 2023-07-15 | End: 2023-07-22

## 2023-07-15 RX ORDER — VANCOMYCIN HCL 1 G
1250 VIAL (EA) INTRAVENOUS EVERY 12 HOURS
Refills: 0 | Status: DISCONTINUED | OUTPATIENT
Start: 2023-07-15 | End: 2023-07-17

## 2023-07-15 RX ORDER — TRAMADOL HYDROCHLORIDE 50 MG/1
25 TABLET ORAL EVERY 8 HOURS
Refills: 0 | Status: DISCONTINUED | OUTPATIENT
Start: 2023-07-15 | End: 2023-07-16

## 2023-07-15 RX ORDER — SODIUM CHLORIDE 9 MG/ML
1000 INJECTION, SOLUTION INTRAVENOUS
Refills: 0 | Status: DISCONTINUED | OUTPATIENT
Start: 2023-07-15 | End: 2023-07-22

## 2023-07-15 RX ORDER — INSULIN GLARGINE 100 [IU]/ML
24 INJECTION, SOLUTION SUBCUTANEOUS AT BEDTIME
Refills: 0 | Status: DISCONTINUED | OUTPATIENT
Start: 2023-07-15 | End: 2023-07-17

## 2023-07-15 RX ORDER — INSULIN LISPRO 100/ML
0 VIAL (ML) SUBCUTANEOUS
Qty: 0 | Refills: 0 | DISCHARGE

## 2023-07-15 RX ORDER — INSULIN GLARGINE 100 [IU]/ML
20 INJECTION, SOLUTION SUBCUTANEOUS
Qty: 0 | Refills: 0 | DISCHARGE

## 2023-07-15 RX ORDER — GLUCAGON INJECTION, SOLUTION 0.5 MG/.1ML
1 INJECTION, SOLUTION SUBCUTANEOUS ONCE
Refills: 0 | Status: DISCONTINUED | OUTPATIENT
Start: 2023-07-15 | End: 2023-07-22

## 2023-07-15 RX ORDER — INSULIN LISPRO 100/ML
VIAL (ML) SUBCUTANEOUS
Refills: 0 | Status: DISCONTINUED | OUTPATIENT
Start: 2023-07-15 | End: 2023-07-22

## 2023-07-15 RX ORDER — DEXTROSE 50 % IN WATER 50 %
25 SYRINGE (ML) INTRAVENOUS ONCE
Refills: 0 | Status: DISCONTINUED | OUTPATIENT
Start: 2023-07-15 | End: 2023-07-22

## 2023-07-15 RX ORDER — METRONIDAZOLE 500 MG
500 TABLET ORAL EVERY 8 HOURS
Refills: 0 | Status: DISCONTINUED | OUTPATIENT
Start: 2023-07-15 | End: 2023-07-15

## 2023-07-15 RX ORDER — PANTOPRAZOLE SODIUM 20 MG/1
40 TABLET, DELAYED RELEASE ORAL
Refills: 0 | Status: DISCONTINUED | OUTPATIENT
Start: 2023-07-15 | End: 2023-07-22

## 2023-07-15 RX ORDER — ATORVASTATIN CALCIUM 80 MG/1
40 TABLET, FILM COATED ORAL AT BEDTIME
Refills: 0 | Status: DISCONTINUED | OUTPATIENT
Start: 2023-07-15 | End: 2023-07-22

## 2023-07-15 RX ORDER — GABAPENTIN 400 MG/1
100 CAPSULE ORAL THREE TIMES A DAY
Refills: 0 | Status: DISCONTINUED | OUTPATIENT
Start: 2023-07-15 | End: 2023-07-16

## 2023-07-15 RX ORDER — LOSARTAN POTASSIUM 100 MG/1
25 TABLET, FILM COATED ORAL DAILY
Refills: 0 | Status: DISCONTINUED | OUTPATIENT
Start: 2023-07-15 | End: 2023-07-22

## 2023-07-15 RX ORDER — DEXTROSE 50 % IN WATER 50 %
15 SYRINGE (ML) INTRAVENOUS ONCE
Refills: 0 | Status: DISCONTINUED | OUTPATIENT
Start: 2023-07-15 | End: 2023-07-22

## 2023-07-15 RX ADMIN — Medication 650 MILLIGRAM(S): at 22:41

## 2023-07-15 RX ADMIN — TRAMADOL HYDROCHLORIDE 25 MILLIGRAM(S): 50 TABLET ORAL at 08:17

## 2023-07-15 RX ADMIN — Medication 100 MILLIGRAM(S): at 05:37

## 2023-07-15 RX ADMIN — PANTOPRAZOLE SODIUM 40 MILLIGRAM(S): 20 TABLET, DELAYED RELEASE ORAL at 05:36

## 2023-07-15 RX ADMIN — Medication 2: at 08:17

## 2023-07-15 RX ADMIN — ENOXAPARIN SODIUM 40 MILLIGRAM(S): 100 INJECTION SUBCUTANEOUS at 12:47

## 2023-07-15 RX ADMIN — Medication 166.67 MILLIGRAM(S): at 17:20

## 2023-07-15 RX ADMIN — Medication 650 MILLIGRAM(S): at 22:11

## 2023-07-15 RX ADMIN — GABAPENTIN 100 MILLIGRAM(S): 400 CAPSULE ORAL at 05:36

## 2023-07-15 RX ADMIN — Medication 81 MILLIGRAM(S): at 12:47

## 2023-07-15 RX ADMIN — TAMSULOSIN HYDROCHLORIDE 0.4 MILLIGRAM(S): 0.4 CAPSULE ORAL at 22:05

## 2023-07-15 RX ADMIN — INSULIN GLARGINE 24 UNIT(S): 100 INJECTION, SOLUTION SUBCUTANEOUS at 22:05

## 2023-07-15 RX ADMIN — ATORVASTATIN CALCIUM 40 MILLIGRAM(S): 80 TABLET, FILM COATED ORAL at 22:05

## 2023-07-15 RX ADMIN — TRAMADOL HYDROCHLORIDE 25 MILLIGRAM(S): 50 TABLET ORAL at 09:38

## 2023-07-15 RX ADMIN — GABAPENTIN 100 MILLIGRAM(S): 400 CAPSULE ORAL at 13:00

## 2023-07-15 RX ADMIN — TRAMADOL HYDROCHLORIDE 25 MILLIGRAM(S): 50 TABLET ORAL at 18:30

## 2023-07-15 RX ADMIN — Medication 1: at 17:21

## 2023-07-15 RX ADMIN — Medication 100 MILLIGRAM(S): at 13:00

## 2023-07-15 RX ADMIN — TRAMADOL HYDROCHLORIDE 25 MILLIGRAM(S): 50 TABLET ORAL at 17:32

## 2023-07-15 RX ADMIN — GABAPENTIN 100 MILLIGRAM(S): 400 CAPSULE ORAL at 22:05

## 2023-07-15 RX ADMIN — Medication 650 MILLIGRAM(S): at 13:16

## 2023-07-15 RX ADMIN — Medication 650 MILLIGRAM(S): at 12:46

## 2023-07-15 RX ADMIN — Medication 2: at 12:46

## 2023-07-15 RX ADMIN — LOSARTAN POTASSIUM 25 MILLIGRAM(S): 100 TABLET, FILM COATED ORAL at 05:36

## 2023-07-15 NOTE — H&P ADULT - ASSESSMENT
68 yrs old M with PMHx of  DM II on insulin, CAD s/p CABG(2015), HTN, HLD, SHx of B/L shoulder and Lt. knee replacement, presents with right index finger swelling and pain.    #Acute Rt IF paronychia and cellulitis s/p I&D on 7/12  - s/p Augmentin 3 days  - HD stable, No leukocytosis, not septic  - r/o Herpetic mark, denied any Hx of cold sores or similar rash, sick contacts, F/u HSV pcr  - x-ray showed no fractures/dislocation   - ESR/CRP 40/24, Trend ESR/CRP  - s/p Unasyn and Bactrim in ED, c/w Doxy and Flagyl  - c/w Tylenol, Tramadol, gabapentin for pain  - F/u Hand Sx recs  - c/w warm soapy soaks TID    #DM II  - c/w Basal and ISS, F/u A1c, Monitor FS    #CAD s/p CABG(2015)  #HTN/HLD/GERD  - c/w home meds    #Misc  -DVT prophylaxis: Lovenox  -GI prophylaxis: PPI  -Diet: DASH/CC  -Code status: Full  -Activity: AAT  -Dispo: Admit to floor    -Med rec confirmed with the daughter(Dyan)       68 yrs old M with PMHx of  DM II on insulin, CAD s/p CABG(2015), HTN, HLD, SHx of B/L shoulder and Lt. knee replacement, presents with right index finger swelling and pain.    #Acute Rt IF paronychia and cellulitis s/p I&D on 7/12  - s/p Augmentin 3 days  - HD stable, No leukocytosis, not septic  - r/o Herpetic mark, denied any Hx of cold sores or similar rash, sick contacts, F/u HSV pcr  - x-ray showed no fractures/dislocation   - ESR/CRP 40/24, Trend ESR/CRP  - s/p Unasyn and Bactrim in ED, c/w Doxy and Flagyl  - c/w Tylenol, Tramadol, gabapentin for pain  - F/u Hand Sx recs- no indication of further I&D, no fluctulance  - c/w warm soapy soaks TID    #DM II  - c/w Basal and ISS, F/u A1c, Monitor FS    #CAD s/p CABG(2015)  #HTN/HLD/GERD  - c/w home meds    #Misc  -DVT prophylaxis: Lovenox  -GI prophylaxis: PPI  -Diet: DASH/CC  -Code status: Full  -Activity: AAT  -Dispo: Admit to floor    -Med rec confirmed with the daughter(Dyan)

## 2023-07-15 NOTE — CONSULT NOTE ADULT - SUBJECTIVE AND OBJECTIVE BOX
JAVI JUDD  68y, Male  Allergy: amoxicillin (Rash; Pruritus; Hives)      CHIEF COMPLAINT:       LOS  1d    HPI  HPI:  68 yrs old M with PMHx of  DM II on insulin, CAD s/p CABG(2015), HTN, HLD, SHx of B/L shoulder and Lt. knee replacement, presents with right index finger swelling and pain.    Pt. reports that about 5-6 days ago he started feeling burning pain followed by a rash over the proximal part of the finger nail. Over next few days it bacme more swollen so he initially presented to the ED on 7/12/2023, when he underwent bedside I&D by ED with blood and purulent drainage and was discharged the same day on PO Augmentin. Patient returned to ED today, because pain and swelling of his right IF was not improving. Patient states he was taking his PO antibiotics. Patient continues to complain of right IF pain and swelling. Pt. reports having pain over the Rt index and middle finger and radiating down to the dorsum of the hand. Denied any Hx of cold sores or similar rash in the past. Pt. is retired and likes to cook at home(but denied any cuts or injury). Pt. denied any preceding trauma, bites or cut prior to the infection, sick contacts. Denies numbness and tingling, discharge. Denies fever, chills, nausea, vomiting, chest pain, urinary symptoms. Endorsed having occasional constipation.    In ED, vital were stable.  Labs significant for mildly elevated ESR/CRP 40/24, Cr 1.4(baseline unknown), suspected AMBER over CKD3a,   X-ray Rt hand showed no fracture or dislocation  Pt. was seen by Hand Sx, there was no drainable abscess. Pt. admitted for IV Abx and monitoring.    Vital Signs Last 24 Hrs  T(F): 97.6 (15 Jul 2023 00:10), Max: 97.6 (15 Jul 2023 00:10)  HR: 67 (15 Jul 2023 00:10) (67 - 86)  BP: 176/79 (15 Jul 2023 00:10) (130/73 - 176/79)  RR: 18 (15 Jul 2023 00:10) (18 - 18)  SpO2: 98% (15 Jul 2023 00:10) (98% - 99%)     (15 Jul 2023 03:38)      INFECTIOUS DISEASE HISTORY:  ID consulted for finger infection   s/p I&D 7/12, d/c on augmentin  Seen by Ortho in ER_-- Skin intact  5 mm healing, closed incisional wound over proximal nail fold  Swelling and erythema of proximal nail fold present  No fluctuance present  No drainage noted  No pus noted under nail  TTP over proximal nail fold  DIP/PIP flexion/extension intact  Radial pulse 2+, finger wwp    Currently ordered for:  doxycycline IVPB 100 milliGRAM(s) IV Intermittent every 12 hours  metroNIDAZOLE  IVPB 500 milliGRAM(s) IV Intermittent every 8 hours      PMH  PAST MEDICAL & SURGICAL HISTORY:  ASHD (arteriosclerotic heart disease)      Hypertension, unspecified type      Hyperlipidemia, unspecified hyperlipidemia type      Type 2 diabetes mellitus with complication, unspecified long term insulin use status      History of open heart surgery  CABG x 4, 5 years ago          FAMILY HISTORY  No pertinent family history in first degree relatives    FH: heart disease        SOCIAL HISTORY  Social History:  Lives with family  Retired  Former smoker, quit smoking in 2015 (15 Jul 2023 03:38)        ROS  ***    VITALS:  T(F): 97.7, Max: 98.1 (07-15-23 @ 05:30)  HR: 61  BP: 169/82  RR: 18Vital Signs Last 24 Hrs  T(C): 36.5 (15 Jul 2023 07:58), Max: 36.7 (15 Jul 2023 05:30)  T(F): 97.7 (15 Jul 2023 07:58), Max: 98.1 (15 Jul 2023 05:30)  HR: 61 (15 Jul 2023 07:58) (61 - 86)  BP: 169/82 (15 Jul 2023 07:58) (130/73 - 176/79)  BP(mean): 118 (15 Jul 2023 07:58) (118 - 118)  RR: 18 (15 Jul 2023 07:58) (18 - 18)  SpO2: 99% (15 Jul 2023 07:58) (98% - 99%)    Parameters below as of 15 Jul 2023 07:58  Patient On (Oxygen Delivery Method): room air        PHYSICAL EXAM:  ***    TESTS & MEASUREMENTS:                        13.8   7.20  )-----------( 158      ( 15 Jul 2023 07:30 )             41.6     07-15    138  |  106  |  18  ----------------------------<  224<H>  4.2   |  21  |  1.1    Ca    8.7      15 Jul 2023 07:30    TPro  6.3  /  Alb  3.8  /  TBili  0.3  /  DBili  x   /  AST  16  /  ALT  23  /  AlkPhos  80  07-15      LIVER FUNCTIONS - ( 15 Jul 2023 07:30 )  Alb: 3.8 g/dL / Pro: 6.3 g/dL / ALK PHOS: 80 U/L / ALT: 23 U/L / AST: 16 U/L / GGT: x           Urinalysis Basic - ( 15 Jul 2023 07:30 )    Color: x / Appearance: x / SG: x / pH: x  Gluc: 224 mg/dL / Ketone: x  / Bili: x / Urobili: x   Blood: x / Protein: x / Nitrite: x   Leuk Esterase: x / RBC: x / WBC x   Sq Epi: x / Non Sq Epi: x / Bacteria: x              INFECTIOUS DISEASES TESTING      INFLAMMATORY MARKERS  Sedimentation Rate, Erythrocyte: 37 mm/Hr (07-15-23 @ 07:30)      RADIOLOGY & ADDITIONAL TESTS:  I have personally reviewed the last Chest xray  CXR      CT      CARDIOLOGY TESTING      MEDICATIONS  aspirin  chewable 81 Oral daily  atorvastatin 40 Oral at bedtime  dextrose 5%. 1000 IV Continuous <Continuous>  dextrose 5%. 1000 IV Continuous <Continuous>  dextrose 50% Injectable 25 IV Push once  dextrose 50% Injectable 12.5 IV Push once  dextrose 50% Injectable 25 IV Push once  doxycycline IVPB 100 IV Intermittent every 12 hours  enoxaparin Injectable 40 SubCutaneous every 24 hours  gabapentin 100 Oral three times a day  glucagon  Injectable 1 IntraMuscular once  insulin glargine Injectable (LANTUS) 24 SubCutaneous at bedtime  insulin lispro (ADMELOG) corrective regimen sliding scale  SubCutaneous three times a day before meals  losartan 25 Oral daily  metroNIDAZOLE  IVPB 500 IV Intermittent every 8 hours  pantoprazole    Tablet 40 Oral before breakfast  tamsulosin 0.4 Oral at bedtime        ANTIBIOTICS:  doxycycline IVPB 100 milliGRAM(s) IV Intermittent every 12 hours  metroNIDAZOLE  IVPB 500 milliGRAM(s) IV Intermittent every 8 hours      ALLERGIES:  amoxicillin (Rash; Pruritus; Hives)         JAVI JUDD  68y, Male  Allergy: amoxicillin (Rash; Pruritus; Hives)      CHIEF COMPLAINT:       LOS  1d    HPI  HPI:  68 yrs old M with PMHx of  DM II on insulin, CAD s/p CABG(2015), HTN, HLD, SHx of B/L shoulder and Lt. knee replacement, presents with right index finger swelling and pain.    Pt. reports that about 5-6 days ago he started feeling burning pain followed by a rash over the proximal part of the finger nail. Over next few days it bacme more swollen so he initially presented to the ED on 7/12/2023, when he underwent bedside I&D by ED with blood and purulent drainage and was discharged the same day on PO Augmentin. Patient returned to ED today, because pain and swelling of his right IF was not improving. Patient states he was taking his PO antibiotics. Patient continues to complain of right IF pain and swelling. Pt. reports having pain over the Rt index and middle finger and radiating down to the dorsum of the hand. Denied any Hx of cold sores or similar rash in the past. Pt. is retired and likes to cook at home(but denied any cuts or injury). Pt. denied any preceding trauma, bites or cut prior to the infection, sick contacts. Denies numbness and tingling, discharge. Denies fever, chills, nausea, vomiting, chest pain, urinary symptoms. Endorsed having occasional constipation.    In ED, vital were stable.  Labs significant for mildly elevated ESR/CRP 40/24, Cr 1.4(baseline unknown), suspected AMBER over CKD3a,   X-ray Rt hand showed no fracture or dislocation  Pt. was seen by Hand Sx, there was no drainable abscess. Pt. admitted for IV Abx and monitoring.    Vital Signs Last 24 Hrs  T(F): 97.6 (15 Jul 2023 00:10), Max: 97.6 (15 Jul 2023 00:10)  HR: 67 (15 Jul 2023 00:10) (67 - 86)  BP: 176/79 (15 Jul 2023 00:10) (130/73 - 176/79)  RR: 18 (15 Jul 2023 00:10) (18 - 18)  SpO2: 98% (15 Jul 2023 00:10) (98% - 99%)     (15 Jul 2023 03:38)      INFECTIOUS DISEASE HISTORY:  ID consulted for finger infection   denies any trauma, bite, etc to the finger    s/p I&D 7/12, d/c on augmentin  Seen by Ortho in ER_-- Skin intact  5 mm healing, closed incisional wound over proximal nail fold  Swelling and erythema of proximal nail fold present  No fluctuance present  No drainage noted  No pus noted under nail  TTP over proximal nail fold  DIP/PIP flexion/extension intact  Radial pulse 2+, finger wwp    Currently ordered for:  doxycycline IVPB 100 milliGRAM(s) IV Intermittent every 12 hours  metroNIDAZOLE  IVPB 500 milliGRAM(s) IV Intermittent every 8 hours      PMH  PAST MEDICAL & SURGICAL HISTORY:  ASHD (arteriosclerotic heart disease)      Hypertension, unspecified type      Hyperlipidemia, unspecified hyperlipidemia type      Type 2 diabetes mellitus with complication, unspecified long term insulin use status      History of open heart surgery  CABG x 4, 5 years ago          FAMILY HISTORY  No pertinent family history in first degree relatives    FH: heart disease        SOCIAL HISTORY  Social History:  Lives with family  Retired  Former smoker, quit smoking in 2015 (15 Jul 2023 03:38)        ROS  General: Denies rigors, nightsweats  HEENT: Denies headache, rhinorrhea, sore throat, eye pain  CV: Denies CP, palpitations  PULM: Denies wheezing, hemoptysis  GI: Denies hematemesis, hematochezia, melena  : Denies discharge, hematuria  MSK: Denies arthralgias, myalgias  SKIN: as noted above   NEURO: Denies paresthesias, weakness  PSYCH: Denies depression, anxiety     VITALS:  T(F): 97.7, Max: 98.1 (07-15-23 @ 05:30)  HR: 61  BP: 169/82  RR: 18Vital Signs Last 24 Hrs  T(C): 36.5 (15 Jul 2023 07:58), Max: 36.7 (15 Jul 2023 05:30)  T(F): 97.7 (15 Jul 2023 07:58), Max: 98.1 (15 Jul 2023 05:30)  HR: 61 (15 Jul 2023 07:58) (61 - 86)  BP: 169/82 (15 Jul 2023 07:58) (130/73 - 176/79)  BP(mean): 118 (15 Jul 2023 07:58) (118 - 118)  RR: 18 (15 Jul 2023 07:58) (18 - 18)  SpO2: 99% (15 Jul 2023 07:58) (98% - 99%)    Parameters below as of 15 Jul 2023 07:58  Patient On (Oxygen Delivery Method): room air        PHYSICAL EXAM:  Gen: NAD, resting in bed  HEENT: Normocephalic, atraumatic  Neck: supple, no lymphadenopathy  CV: Regular rate & regular rhythm  Lungs: decreased BS at bases, no fremitus  Abdomen: Soft, BS present  Ext: Warm, well perfused  Neuro: non focal, awake  Skin: R index erythema edema, cannot bend, small ulcer, no purulence, healed  Lines: no phlebitis     TESTS & MEASUREMENTS:                        13.8   7.20  )-----------( 158      ( 15 Jul 2023 07:30 )             41.6     07-15    138  |  106  |  18  ----------------------------<  224<H>  4.2   |  21  |  1.1    Ca    8.7      15 Jul 2023 07:30    TPro  6.3  /  Alb  3.8  /  TBili  0.3  /  DBili  x   /  AST  16  /  ALT  23  /  AlkPhos  80  07-15      LIVER FUNCTIONS - ( 15 Jul 2023 07:30 )  Alb: 3.8 g/dL / Pro: 6.3 g/dL / ALK PHOS: 80 U/L / ALT: 23 U/L / AST: 16 U/L / GGT: x           Urinalysis Basic - ( 15 Jul 2023 07:30 )    Color: x / Appearance: x / SG: x / pH: x  Gluc: 224 mg/dL / Ketone: x  / Bili: x / Urobili: x   Blood: x / Protein: x / Nitrite: x   Leuk Esterase: x / RBC: x / WBC x   Sq Epi: x / Non Sq Epi: x / Bacteria: x              INFECTIOUS DISEASES TESTING      INFLAMMATORY MARKERS  Sedimentation Rate, Erythrocyte: 37 mm/Hr (07-15-23 @ 07:30)      RADIOLOGY & ADDITIONAL TESTS:  I have personally reviewed the last Chest xray  CXR      CT      CARDIOLOGY TESTING      MEDICATIONS  aspirin  chewable 81 Oral daily  atorvastatin 40 Oral at bedtime  dextrose 5%. 1000 IV Continuous <Continuous>  dextrose 5%. 1000 IV Continuous <Continuous>  dextrose 50% Injectable 25 IV Push once  dextrose 50% Injectable 12.5 IV Push once  dextrose 50% Injectable 25 IV Push once  doxycycline IVPB 100 IV Intermittent every 12 hours  enoxaparin Injectable 40 SubCutaneous every 24 hours  gabapentin 100 Oral three times a day  glucagon  Injectable 1 IntraMuscular once  insulin glargine Injectable (LANTUS) 24 SubCutaneous at bedtime  insulin lispro (ADMELOG) corrective regimen sliding scale  SubCutaneous three times a day before meals  losartan 25 Oral daily  metroNIDAZOLE  IVPB 500 IV Intermittent every 8 hours  pantoprazole    Tablet 40 Oral before breakfast  tamsulosin 0.4 Oral at bedtime        ANTIBIOTICS:  doxycycline IVPB 100 milliGRAM(s) IV Intermittent every 12 hours  metroNIDAZOLE  IVPB 500 milliGRAM(s) IV Intermittent every 8 hours      ALLERGIES:  amoxicillin (Rash; Pruritus; Hives)

## 2023-07-15 NOTE — PATIENT PROFILE ADULT - FALL HARM RISK - RISK INTERVENTIONS

## 2023-07-15 NOTE — CONSULT NOTE ADULT - ASSESSMENT
ASSESSMENT  68 yrs old M with PMHx of  DM II on insulin, CAD s/p CABG(2015), HTN, HLD, SHx of B/L shoulder and Lt. knee replacement, presents with right index finger swelling and pain.  Pt. reports that about 5-6 days ago he started feeling burning pain followed by a rash over the proximal part of the finger nail. Over next few days it bacme more swollen so he initially presented to the ED on 7/12/2023, when he underwent bedside I&D by ED with blood and purulent drainage and was discharged the same day on PO Augmentin. Patient returned to ED today, because pain and swelling of his right IF was not improving. Patient states he was taking his PO antibiotics.     IMPRESSION  #    Sedimentation Rate, Erythrocyte: 37 mm/Hr (07-15-23 @ 07:30)   C-Reactive Protein, Serum: 18.6 mg/L (07.15.23 @ 07:30)  < from: Xray Hand 3 Views, Right (07.14.23 @ 21:57) >  No evidence of acute fracture or dislocation.    #Sepsis ruled out on admission   #Obesity BMI (kg/m2): 30.5, 32.2  #IDDM   #Abx allergy: amoxicillin (Rash; Pruritus; Hives)    Creatinine: 1.1 (07-15-23 @ 07:30)    Height (cm): 168 (07-14-23 @ 19:24)  Weight (kg): 86.2 (07-14-23 @ 19:24)    RECOMMENDATIONS  This is an incomplete consult note. All final recommendations to follow after interview and examination of the patient. Please follow recommendations noted below.    If any questions, please send a message or call on [employer] Teams  Please continue to update ID with any pertinent new laboratory or radiographic findings  #[id number]   ASSESSMENT  68 yrs old M with PMHx of  DM II on insulin, CAD s/p CABG(2015), HTN, HLD, SHx of B/L shoulder and Lt. knee replacement, presents with right index finger swelling and pain.  Pt. reports that about 5-6 days ago he started feeling burning pain followed by a rash over the proximal part of the finger nail. Over next few days it bacme more swollen so he initially presented to the ED on 7/12/2023, when he underwent bedside I&D by ED with blood and purulent drainage and was discharged the same day on PO Augmentin. Patient returned to ED today, because pain and swelling of his right IF was not improving. Patient states he was taking his PO antibiotics.     IMPRESSION  #R 1st finger paronychia, cellulitis   denies any trauma, bite, etc to the finger    Sedimentation Rate, Erythrocyte: 37 mm/Hr (07-15-23 @ 07:30)   C-Reactive Protein, Serum: 18.6 mg/L (07.15.23 @ 07:30)  < from: Xray Hand 3 Views, Right (07.14.23 @ 21:57) >  No evidence of acute fracture or dislocation.    #Sepsis ruled out on admission   #Obesity BMI (kg/m2): 30.5, 32.2  #IDDM   #Abx allergy: amoxicillin (Rash; Pruritus; Hives)  crcl 67  Creatinine: 1.1 (07-15-23 @ 07:30)    Height (cm): 168 (07-14-23 @ 19:24)  Weight (kg): 86.2 (07-14-23 @ 19:24)    RECOMMENDATIONS  - D/C doxy/flagyl  - Vanc 1.25g q12h IV  - Anticipate D/C on PO bactrim 1 DS BID x 10-14 days  - Appreciate Ortho consult- no intervention    If any questions, please send a message or call on [employer] Teams  Please continue to update ID with any pertinent new laboratory or radiographic findings  #[id number]

## 2023-07-15 NOTE — H&P ADULT - HISTORY OF PRESENT ILLNESS
68 yrs old M with PMHx of  DM II on insulin, CAD s/p CABG(2015), HTN, HLD, SHx of B/L shoulder and Lt. knee replacement, presents with right index finger swelling and pain.    Pt. reports that about 5-6 days ago he started feeling burning pain followed by a rash over the proximal part of the finger nail. Over next few days it bacme more swollen so he initially presented to the ED on 7/12/2023, when he underwent bedside I&D by ED with blood and purulent drainage and was discharged the same day on PO Augmentin. Patient returned to ED today, because pain and swelling of his right IF was not improving. Patient states he was taking his PO antibiotics. Patient continues to complain of right IF pain and swelling. Pt. reports having pain over the Rt index and middle finger and radiating down to the dorsum of the hand. Denied any Hx of cold sores or similar rash in the past. Pt. denied any preceding trauma, bites or cut prior to the infection, sick contacts. Denies numbness and tingling, discharge. Denies fever, chills, nausea, vomiting, chest pain, urinary symptoms. Endorsed having occasional constipation.    In ED, vital were stable.  Labs significant for mildly elevated ESR/CRP 40/24, Cr 1.4(baseline unknown), suspected AMBER over CKD3a,   X-ray Rt hand showed no fracture or dislocation  Pt. was seen by Hand Sx, there was no drainable abscess. Pt. admitted for IV Abx and monitoring.    Vital Signs Last 24 Hrs  T(F): 97.6 (15 Jul 2023 00:10), Max: 97.6 (15 Jul 2023 00:10)  HR: 67 (15 Jul 2023 00:10) (67 - 86)  BP: 176/79 (15 Jul 2023 00:10) (130/73 - 176/79)  RR: 18 (15 Jul 2023 00:10) (18 - 18)  SpO2: 98% (15 Jul 2023 00:10) (98% - 99%)     68 yrs old M with PMHx of  DM II on insulin, CAD s/p CABG(2015), HTN, HLD, SHx of B/L shoulder and Lt. knee replacement, presents with right index finger swelling and pain.    Pt. reports that about 5-6 days ago he started feeling burning pain followed by a rash over the proximal part of the finger nail. Over next few days it bacme more swollen so he initially presented to the ED on 7/12/2023, when he underwent bedside I&D by ED with blood and purulent drainage and was discharged the same day on PO Augmentin. Patient returned to ED today, because pain and swelling of his right IF was not improving. Patient states he was taking his PO antibiotics. Patient continues to complain of right IF pain and swelling. Pt. reports having pain over the Rt index and middle finger and radiating down to the dorsum of the hand. Denied any Hx of cold sores or similar rash in the past. Pt. is retired and likes to cook at home(but denied any cuts or injury). Pt. denied any preceding trauma, bites or cut prior to the infection, sick contacts. Denies numbness and tingling, discharge. Denies fever, chills, nausea, vomiting, chest pain, urinary symptoms. Endorsed having occasional constipation.    In ED, vital were stable.  Labs significant for mildly elevated ESR/CRP 40/24, Cr 1.4(baseline unknown), suspected AMBER over CKD3a,   X-ray Rt hand showed no fracture or dislocation  Pt. was seen by Hand Sx, there was no drainable abscess. Pt. admitted for IV Abx and monitoring.    Vital Signs Last 24 Hrs  T(F): 97.6 (15 Jul 2023 00:10), Max: 97.6 (15 Jul 2023 00:10)  HR: 67 (15 Jul 2023 00:10) (67 - 86)  BP: 176/79 (15 Jul 2023 00:10) (130/73 - 176/79)  RR: 18 (15 Jul 2023 00:10) (18 - 18)  SpO2: 98% (15 Jul 2023 00:10) (98% - 99%)

## 2023-07-15 NOTE — ED ADULT NURSE REASSESSMENT NOTE - NS ED NURSE REASSESS COMMENT FT1
pt admitted to medicine. pt moved to ED4. report given to nurse @ED4. pt a&ox3. no ss of acute distress. safety maintained. all care rendered.

## 2023-07-15 NOTE — H&P ADULT - ATTENDING COMMENTS
Patient was examined on 7/15    Overnight events  no acute overnight events. Patient finger is still swollen and extremely painful to touch.  he denies fevers. he denies any trauma. currently states no improvement in his symptoms for the since 7/12.     ICU Vital Signs Last 24 Hrs  T(C): 36.5 (15 Jul 2023 07:58), Max: 36.7 (15 Jul 2023 05:30)  T(F): 97.7 (15 Jul 2023 07:58), Max: 98.1 (15 Jul 2023 05:30)  HR: 61 (15 Jul 2023 07:58) (61 - 86)  BP: 169/82 (15 Jul 2023 07:58) (130/73 - 176/79)  BP(mean): 118 (15 Jul 2023 07:58) (118 - 118)  RR: 18 (15 Jul 2023 07:58) (18 - 18)  SpO2: 99% (15 Jul 2023 07:58) (98% - 99%)    O2 Parameters below as of 15 Jul 2023 07:58  Patient On (Oxygen Delivery Method): room air    LABS:  cret                        13.8   7.20  )-----------( 158      ( 15 Jul 2023 07:30 )             41.6     07-15    138  |  106  |  18  ----------------------------<  224<H>  4.2   |  21  |  1.1    Ca    8.7      15 Jul 2023 07:30    TPro  6.3  /  Alb  3.8  /  TBili  0.3  /  DBili  x   /  AST  16  /  ALT  23  /  AlkPhos  80  07-15    PT/INR - ( 15 Jul 2023 07:30 )   PT: 10.90 sec;   INR: 0.96 ratio         PTT - ( 15 Jul 2023 07:30 )  PTT:29.5 sec    PHYSICAL EXAM:  GENERAL: NAD, lying in bed comfortably  HEAD:  Atraumatic, Normocephalic  EYES: EOMI, PERRLA, conjunctiva and sclera clear  ENT: Moist mucous membranes  NECK: Supple, No JVD  CHEST/LUNG: Clear to auscultation bilaterally; No rales, rhonchi, wheezing, or rubs. Unlabored respirations  HEART: Regular rate and rhythm; No murmurs, rubs, or gallops  ABDOMEN: Bowel sounds present; Soft, Nontender, Nondistended.   EXTREMITIES:  Right index erythema with punture wound in the center. Very TTP. Unable to bend finger.    68 yrs old M with PMHx of  DM II on insulin, CAD s/p CABG(2015), HTN, HLD, SHx of B/L shoulder and Lt. knee replacement, presents with right index finger swelling and pain.    #Acute Rt IF paronychia and cellulitis s/p I&D on 7/12  - failed Augmentin 3 days  - HD stable, No leukocytosis, not septic  - x-ray showed no fractures/dislocation   - ESR/CRP 40/24,  - start Vancomycin   - dc on bactrim   - c/w Tylenol, Tramadol, gabapentin for pain  - check CT of right hand  - F/u Hand Sx recs- no indication of further I&D, no fluctulance  - ID consult appreciated     #DM II  - c/w Basal and ISS, F/u A1c, Monitor FS    #CAD s/p CABG(2015)  #HTN/HLD/GERD  - c/w home meds    #Misc  -DVT prophylaxis: Lovenox  -GI prophylaxis: PPI  -Diet: DASH/CC  -Code status: Full  -Activity: AAT  -Dispo: CT imaging, anticipate dc neeraj

## 2023-07-15 NOTE — H&P ADULT - NSHPPHYSICALEXAM_GEN_ALL_CORE
GENERAL: NAD, lying in bed comfortably  CHEST/LUNG: Clear to auscultation bilaterally; No rales, rhonchi, wheezing, or rubs. Unlabored respirations  HEART: Regular rate and rhythm  ABDOMEN: Soft, Nontender, Nondistended  EXTREMITIES: Rt index finger swollen, a healed scar noted just proximal to the nail, tenderness+; Trace LE edema  NERVOUS SYSTEM:  Alert & Oriented X3, speech clear. No deficits

## 2023-07-16 LAB
ALBUMIN SERPL ELPH-MCNC: 3.7 G/DL — SIGNIFICANT CHANGE UP (ref 3.5–5.2)
ALP SERPL-CCNC: 78 U/L — SIGNIFICANT CHANGE UP (ref 30–115)
ALT FLD-CCNC: 21 U/L — SIGNIFICANT CHANGE UP (ref 0–41)
ANION GAP SERPL CALC-SCNC: 9 MMOL/L — SIGNIFICANT CHANGE UP (ref 7–14)
AST SERPL-CCNC: 14 U/L — SIGNIFICANT CHANGE UP (ref 0–41)
BASOPHILS # BLD AUTO: 0.01 K/UL — SIGNIFICANT CHANGE UP (ref 0–0.2)
BASOPHILS NFR BLD AUTO: 0.2 % — SIGNIFICANT CHANGE UP (ref 0–1)
BILIRUB SERPL-MCNC: 0.3 MG/DL — SIGNIFICANT CHANGE UP (ref 0.2–1.2)
BUN SERPL-MCNC: 11 MG/DL — SIGNIFICANT CHANGE UP (ref 10–20)
CALCIUM SERPL-MCNC: 9.2 MG/DL — SIGNIFICANT CHANGE UP (ref 8.4–10.5)
CHLORIDE SERPL-SCNC: 102 MMOL/L — SIGNIFICANT CHANGE UP (ref 98–110)
CO2 SERPL-SCNC: 25 MMOL/L — SIGNIFICANT CHANGE UP (ref 17–32)
CREAT SERPL-MCNC: 0.9 MG/DL — SIGNIFICANT CHANGE UP (ref 0.7–1.5)
EGFR: 94 ML/MIN/1.73M2 — SIGNIFICANT CHANGE UP
EOSINOPHIL # BLD AUTO: 0.17 K/UL — SIGNIFICANT CHANGE UP (ref 0–0.7)
EOSINOPHIL NFR BLD AUTO: 2.7 % — SIGNIFICANT CHANGE UP (ref 0–8)
GLUCOSE BLDC GLUCOMTR-MCNC: 165 MG/DL — HIGH (ref 70–99)
GLUCOSE BLDC GLUCOMTR-MCNC: 182 MG/DL — HIGH (ref 70–99)
GLUCOSE BLDC GLUCOMTR-MCNC: 183 MG/DL — HIGH (ref 70–99)
GLUCOSE BLDC GLUCOMTR-MCNC: 244 MG/DL — HIGH (ref 70–99)
GLUCOSE SERPL-MCNC: 244 MG/DL — HIGH (ref 70–99)
HCT VFR BLD CALC: 41.2 % — LOW (ref 42–52)
HGB BLD-MCNC: 13.3 G/DL — LOW (ref 14–18)
HSV DNA1: SIGNIFICANT CHANGE UP
HSV DNA2: SIGNIFICANT CHANGE UP
HSV1 DNA BLD QL NAA+PROBE: SIGNIFICANT CHANGE UP
HSV2 DNA BLD QL NAA+PROBE: SIGNIFICANT CHANGE UP
IMM GRANULOCYTES NFR BLD AUTO: 0.3 % — SIGNIFICANT CHANGE UP (ref 0.1–0.3)
LYMPHOCYTES # BLD AUTO: 1.82 K/UL — SIGNIFICANT CHANGE UP (ref 1.2–3.4)
LYMPHOCYTES # BLD AUTO: 28.4 % — SIGNIFICANT CHANGE UP (ref 20.5–51.1)
MAGNESIUM SERPL-MCNC: 1.6 MG/DL — LOW (ref 1.8–2.4)
MCHC RBC-ENTMCNC: 28.5 PG — SIGNIFICANT CHANGE UP (ref 27–31)
MCHC RBC-ENTMCNC: 32.3 G/DL — SIGNIFICANT CHANGE UP (ref 32–37)
MCV RBC AUTO: 88.2 FL — SIGNIFICANT CHANGE UP (ref 80–94)
MONOCYTES # BLD AUTO: 0.61 K/UL — HIGH (ref 0.1–0.6)
MONOCYTES NFR BLD AUTO: 9.5 % — HIGH (ref 1.7–9.3)
NEUTROPHILS # BLD AUTO: 3.77 K/UL — SIGNIFICANT CHANGE UP (ref 1.4–6.5)
NEUTROPHILS NFR BLD AUTO: 58.9 % — SIGNIFICANT CHANGE UP (ref 42.2–75.2)
NRBC # BLD: 0 /100 WBCS — SIGNIFICANT CHANGE UP (ref 0–0)
PLATELET # BLD AUTO: 161 K/UL — SIGNIFICANT CHANGE UP (ref 130–400)
PMV BLD: 12.7 FL — HIGH (ref 7.4–10.4)
POTASSIUM SERPL-MCNC: 4.4 MMOL/L — SIGNIFICANT CHANGE UP (ref 3.5–5)
POTASSIUM SERPL-SCNC: 4.4 MMOL/L — SIGNIFICANT CHANGE UP (ref 3.5–5)
PROT SERPL-MCNC: 6.2 G/DL — SIGNIFICANT CHANGE UP (ref 6–8)
RBC # BLD: 4.67 M/UL — LOW (ref 4.7–6.1)
RBC # FLD: 14.4 % — SIGNIFICANT CHANGE UP (ref 11.5–14.5)
SODIUM SERPL-SCNC: 136 MMOL/L — SIGNIFICANT CHANGE UP (ref 135–146)
URATE SERPL-MCNC: 4.8 MG/DL — SIGNIFICANT CHANGE UP (ref 3.4–8.8)
WBC # BLD: 6.4 K/UL — SIGNIFICANT CHANGE UP (ref 4.8–10.8)
WBC # FLD AUTO: 6.4 K/UL — SIGNIFICANT CHANGE UP (ref 4.8–10.8)

## 2023-07-16 PROCEDURE — 99233 SBSQ HOSP IP/OBS HIGH 50: CPT

## 2023-07-16 PROCEDURE — 99232 SBSQ HOSP IP/OBS MODERATE 35: CPT

## 2023-07-16 RX ORDER — OXYCODONE AND ACETAMINOPHEN 5; 325 MG/1; MG/1
1 TABLET ORAL EVERY 4 HOURS
Refills: 0 | Status: DISCONTINUED | OUTPATIENT
Start: 2023-07-16 | End: 2023-07-16

## 2023-07-16 RX ORDER — CEFTRIAXONE 500 MG/1
2000 INJECTION, POWDER, FOR SOLUTION INTRAMUSCULAR; INTRAVENOUS EVERY 24 HOURS
Refills: 0 | Status: DISCONTINUED | OUTPATIENT
Start: 2023-07-17 | End: 2023-07-22

## 2023-07-16 RX ORDER — POLYETHYLENE GLYCOL 3350 17 G/17G
17 POWDER, FOR SOLUTION ORAL DAILY
Refills: 0 | Status: DISCONTINUED | OUTPATIENT
Start: 2023-07-16 | End: 2023-07-22

## 2023-07-16 RX ORDER — TRAMADOL HYDROCHLORIDE 50 MG/1
25 TABLET ORAL EVERY 4 HOURS
Refills: 0 | Status: DISCONTINUED | OUTPATIENT
Start: 2023-07-16 | End: 2023-07-22

## 2023-07-16 RX ORDER — INSULIN LISPRO 100/ML
2 VIAL (ML) SUBCUTANEOUS
Refills: 0 | Status: DISCONTINUED | OUTPATIENT
Start: 2023-07-16 | End: 2023-07-17

## 2023-07-16 RX ORDER — MAGNESIUM SULFATE 500 MG/ML
2 VIAL (ML) INJECTION ONCE
Refills: 0 | Status: COMPLETED | OUTPATIENT
Start: 2023-07-16 | End: 2023-07-16

## 2023-07-16 RX ORDER — CEFTRIAXONE 500 MG/1
2000 INJECTION, POWDER, FOR SOLUTION INTRAMUSCULAR; INTRAVENOUS ONCE
Refills: 0 | Status: COMPLETED | OUTPATIENT
Start: 2023-07-16 | End: 2023-07-16

## 2023-07-16 RX ORDER — CEFTRIAXONE 500 MG/1
INJECTION, POWDER, FOR SOLUTION INTRAMUSCULAR; INTRAVENOUS
Refills: 0 | Status: DISCONTINUED | OUTPATIENT
Start: 2023-07-16 | End: 2023-07-22

## 2023-07-16 RX ADMIN — Medication 2 UNIT(S): at 17:11

## 2023-07-16 RX ADMIN — ATORVASTATIN CALCIUM 40 MILLIGRAM(S): 80 TABLET, FILM COATED ORAL at 21:51

## 2023-07-16 RX ADMIN — TRAMADOL HYDROCHLORIDE 25 MILLIGRAM(S): 50 TABLET ORAL at 00:32

## 2023-07-16 RX ADMIN — TRAMADOL HYDROCHLORIDE 25 MILLIGRAM(S): 50 TABLET ORAL at 01:02

## 2023-07-16 RX ADMIN — TAMSULOSIN HYDROCHLORIDE 0.4 MILLIGRAM(S): 0.4 CAPSULE ORAL at 21:51

## 2023-07-16 RX ADMIN — POLYETHYLENE GLYCOL 3350 17 GRAM(S): 17 POWDER, FOR SOLUTION ORAL at 21:53

## 2023-07-16 RX ADMIN — Medication 166.67 MILLIGRAM(S): at 17:37

## 2023-07-16 RX ADMIN — Medication 1: at 12:22

## 2023-07-16 RX ADMIN — Medication 25 GRAM(S): at 15:00

## 2023-07-16 RX ADMIN — GABAPENTIN 100 MILLIGRAM(S): 400 CAPSULE ORAL at 05:20

## 2023-07-16 RX ADMIN — GABAPENTIN 100 MILLIGRAM(S): 400 CAPSULE ORAL at 14:55

## 2023-07-16 RX ADMIN — Medication 81 MILLIGRAM(S): at 12:17

## 2023-07-16 RX ADMIN — LOSARTAN POTASSIUM 25 MILLIGRAM(S): 100 TABLET, FILM COATED ORAL at 05:21

## 2023-07-16 RX ADMIN — ENOXAPARIN SODIUM 40 MILLIGRAM(S): 100 INJECTION SUBCUTANEOUS at 12:17

## 2023-07-16 RX ADMIN — PANTOPRAZOLE SODIUM 40 MILLIGRAM(S): 20 TABLET, DELAYED RELEASE ORAL at 05:21

## 2023-07-16 RX ADMIN — CEFTRIAXONE 100 MILLIGRAM(S): 500 INJECTION, POWDER, FOR SOLUTION INTRAMUSCULAR; INTRAVENOUS at 17:37

## 2023-07-16 RX ADMIN — Medication 1: at 08:41

## 2023-07-16 RX ADMIN — Medication 166.67 MILLIGRAM(S): at 05:21

## 2023-07-16 RX ADMIN — INSULIN GLARGINE 24 UNIT(S): 100 INJECTION, SOLUTION SUBCUTANEOUS at 21:51

## 2023-07-16 RX ADMIN — TRAMADOL HYDROCHLORIDE 25 MILLIGRAM(S): 50 TABLET ORAL at 17:14

## 2023-07-16 NOTE — PROGRESS NOTE ADULT - ASSESSMENT
ASSESSMENT  68 yrs old M with PMHx of  DM II on insulin, CAD s/p CABG(2015), HTN, HLD, SHx of B/L shoulder and Lt. knee replacement, presents with right index finger swelling and pain.  Pt. reports that about 5-6 days ago he started feeling burning pain followed by a rash over the proximal part of the finger nail. Over next few days it bacme more swollen so he initially presented to the ED on 7/12/2023, when he underwent bedside I&D by ED with blood and purulent drainage and was discharged the same day on PO Augmentin. Patient returned to ED today, because pain and swelling of his right IF was not improving. Patient states he was taking his PO antibiotics.     IMPRESSION  #R 1st finger paronychia, cellulitis   denies any trauma, bite, etc to the finger  < from: CT Hand w/ IV Cont, Right (07.15.23 @ 22:47) >  Question of cortical erosive changes and periosteal reaction at the   distal aspect of the middle phalanx of the second digit.Associated soft   tissue swelling. This may reflect early osteomyelitis versus erosive   degenerative change. Consider follow-up MRI with IV contrast.  No locules of air to indicate a gas-forming infectious process. No   abscess.  < from: Xray Hand 3 Views, Right (07.14.23 @ 21:57) >  No evidence of acute fracture or dislocation.  Sedimentation Rate, Erythrocyte: 37 mm/Hr (07-15-23 @ 07:30)  C-Reactive Protein, Serum: 18.6 mg/L (07-15-23 @ 07:30)  C-Reactive Protein, Serum: 24.3 mg/L (07-14-23 @ 21:38)  Sedimentation Rate, Erythrocyte: 40 mm/Hr (07-14-23 @ 21:38)  #Sepsis ruled out on admission   #Obesity BMI (kg/m2): 30.5, 32.2  #IDDM   #Abx allergy: amoxicillin (Rash; Pruritus; Hives)  crcl 67  Creatinine: 1.1 (07-15-23 @ 07:30)    Height (cm): 168 (07-14-23 @ 19:24)  Weight (kg): 86.2 (07-14-23 @ 19:24)    RECOMMENDATIONS  - MRI  - Vanc 1.25g q12h IV  - Appreciate Ortho consult    If any questions, please send a message or call on [facility] [employer] Teams  Please continue to update ID with any pertinent new laboratory or radiographic findings  #[id number]   ASSESSMENT  68 yrs old M with PMHx of  DM II on insulin, CAD s/p CABG(2015), HTN, HLD, SHx of B/L shoulder and Lt. knee replacement, presents with right index finger swelling and pain.  Pt. reports that about 5-6 days ago he started feeling burning pain followed by a rash over the proximal part of the finger nail. Over next few days it bacme more swollen so he initially presented to the ED on 7/12/2023, when he underwent bedside I&D by ED with blood and purulent drainage and was discharged the same day on PO Augmentin. Patient returned to ED today, because pain and swelling of his right IF was not improving. Patient states he was taking his PO antibiotics.     IMPRESSION  #R 1st finger paronychia, cellulitis   denies any trauma, bite, etc to the finger  < from: CT Hand w/ IV Cont, Right (07.15.23 @ 22:47) >  Question of cortical erosive changes and periosteal reaction at the   distal aspect of the middle phalanx of the second digit.Associated soft   tissue swelling. This may reflect early osteomyelitis versus erosive   degenerative change. Consider follow-up MRI with IV contrast.  No locules of air to indicate a gas-forming infectious process. No   abscess.  < from: Xray Hand 3 Views, Right (07.14.23 @ 21:57) >  No evidence of acute fracture or dislocation.  Sedimentation Rate, Erythrocyte: 37 mm/Hr (07-15-23 @ 07:30)  C-Reactive Protein, Serum: 18.6 mg/L (07-15-23 @ 07:30)  C-Reactive Protein, Serum: 24.3 mg/L (07-14-23 @ 21:38)  Sedimentation Rate, Erythrocyte: 40 mm/Hr (07-14-23 @ 21:38)  #Sepsis ruled out on admission   #Obesity BMI (kg/m2): 30.5, 32.2  #IDDM   #Abx allergy: amoxicillin (Rash; Pruritus; Hives)  crcl 67  Creatinine: 1.1 (07-15-23 @ 07:30)    Height (cm): 168 (07-14-23 @ 19:24)  Weight (kg): 86.2 (07-14-23 @ 19:24)    RECOMMENDATIONS  - MRI  - Vanc 1.25g q12h IV  - ADD Ceftriaxone 2g q24h IV  - Appreciate Ortho consult    If any questions, please send a message or call on [employer] Teams  Please continue to update ID with any pertinent new laboratory or radiographic findings  #[id number]

## 2023-07-16 NOTE — PROGRESS NOTE ADULT - SUBJECTIVE AND OBJECTIVE BOX
Patient is a 68y old  Male who presents with a chief complaint of RT 1st digit swelling with concern for skin and soft tissue infection vs OM    SUBJECTIVE / OVERNIGHT EVENTS: Pt feels like the pain is 10/10 in his finger; sometimes radiates to the dorsum of the same hand. Unable to flex finger except at the MTP joint. Denies any systemic features except for intermittent chills.  ADDITIONAL REVIEW OF SYSTEMS: As above    MEDICATIONS  (STANDING):  aspirin  chewable 81 milliGRAM(s) Oral daily  atorvastatin 40 milliGRAM(s) Oral at bedtime  cefTRIAXone   IVPB      cefTRIAXone   IVPB 2000 milliGRAM(s) IV Intermittent once  dextrose 5%. 1000 milliLiter(s) (50 mL/Hr) IV Continuous <Continuous>  dextrose 5%. 1000 milliLiter(s) (100 mL/Hr) IV Continuous <Continuous>  dextrose 50% Injectable 25 Gram(s) IV Push once  dextrose 50% Injectable 12.5 Gram(s) IV Push once  dextrose 50% Injectable 25 Gram(s) IV Push once  enoxaparin Injectable 40 milliGRAM(s) SubCutaneous every 24 hours  gabapentin 100 milliGRAM(s) Oral three times a day  glucagon  Injectable 1 milliGRAM(s) IntraMuscular once  insulin glargine Injectable (LANTUS) 24 Unit(s) SubCutaneous at bedtime  insulin lispro (ADMELOG) corrective regimen sliding scale   SubCutaneous three times a day before meals  insulin lispro Injectable (ADMELOG) 2 Unit(s) SubCutaneous three times a day before meals  losartan 25 milliGRAM(s) Oral daily  pantoprazole    Tablet 40 milliGRAM(s) Oral before breakfast  tamsulosin 0.4 milliGRAM(s) Oral at bedtime  vancomycin  IVPB 1250 milliGRAM(s) IV Intermittent every 12 hours    MEDICATIONS  (PRN):  acetaminophen     Tablet .. 650 milliGRAM(s) Oral every 6 hours PRN Temp greater or equal to 38C (100.4F), Mild Pain (1 - 3)  dextrose Oral Gel 15 Gram(s) Oral once PRN Blood Glucose LESS THAN 70 milliGRAM(s)/deciliter  melatonin 3 milliGRAM(s) Oral at bedtime PRN Insomnia  oxycodone    5 mG/acetaminophen 325 mG 1 Tablet(s) Oral every 4 hours PRN Severe Pain (7 - 10)  traMADol 25 milliGRAM(s) Oral every 8 hours PRN Severe Pain (7 - 10)      CAPILLARY BLOOD GLUCOSE      POCT Blood Glucose.: 183 mg/dL (16 Jul 2023 11:45)  POCT Blood Glucose.: 182 mg/dL (16 Jul 2023 07:56)  POCT Blood Glucose.: 165 mg/dL (16 Jul 2023 00:27)  POCT Blood Glucose.: 194 mg/dL (15 Jul 2023 21:52)  POCT Blood Glucose.: 174 mg/dL (15 Jul 2023 16:12)    I&O's Summary      PHYSICAL EXAM:  Vital Signs Last 24 Hrs  T(C): 36.4 (16 Jul 2023 12:51), Max: 36.7 (15 Jul 2023 17:16)  T(F): 97.6 (16 Jul 2023 12:51), Max: 98 (15 Jul 2023 17:16)  HR: 86 (16 Jul 2023 12:51) (67 - 86)  BP: 137/64 (16 Jul 2023 12:51) (121/78 - 168/83)  BP(mean): --  RR: 16 (16 Jul 2023 12:51) (16 - 19)  SpO2: 98% (16 Jul 2023 12:51) (98% - 99%)    Parameters below as of 16 Jul 2023 04:54  Patient On (Oxygen Delivery Method): room air      CONSTITUTIONAL: NAD, normal weight  EYES: PERRLA; conjunctiva and sclera clear  ENMT: Moist oral mucosa, no pharyngeal injection or exudates; normal dentition  NECK: Supple, no palpable masses; no thyromegaly  RESPIRATORY: Normal respiratory effort; lungs are clear to auscultation bilaterally  CARDIOVASCULAR: Regular rate and rhythm, normal S1 and S2, no murmur/rub/gallop; No lower extremity edema; Peripheral pulses are 2+ bilaterally  ABDOMEN: Nontender to palpation, normoactive bowel sounds, no rebound/guarding; No hepatosplenomegaly  MUSCULOSKELETAL: Rt first finger is swollen and purple and tender on palpation; unable to flex DIP and PIP due to swelling/pain; able to flex at the MTP joint; erythema is not extending beyond the digit  PSYCH: A+O to person, place, and time; affect appropriate  NEUROLOGY: sensation intact in fingers      LABS:                        13.3   6.40  )-----------( 161      ( 16 Jul 2023 06:21 )             41.2     07-16    136  |  102  |  11  ----------------------------<  244<H>  4.4   |  25  |  0.9    Ca    9.2      16 Jul 2023 06:21  Mg     1.6     07-16    TPro  6.2  /  Alb  3.7  /  TBili  0.3  /  DBili  x   /  AST  14  /  ALT  21  /  AlkPhos  78  07-16    PT/INR - ( 15 Jul 2023 07:30 )   PT: 10.90 sec;   INR: 0.96 ratio         PTT - ( 15 Jul 2023 07:30 )  PTT:29.5 sec      Urinalysis Basic - ( 16 Jul 2023 06:21 )    Color: x / Appearance: x / SG: x / pH: x  Gluc: 244 mg/dL / Ketone: x  / Bili: x / Urobili: x   Blood: x / Protein: x / Nitrite: x   Leuk Esterase: x / RBC: x / WBC x   Sq Epi: x / Non Sq Epi: x / Bacteria: x          RADIOLOGY & ADDITIONAL TESTS:  Results Reviewed:   Imaging Personally Reviewed:  Electrocardiogram Personally Reviewed:    COORDINATION OF CARE:  Care Discussed with Consultants/Other Providers [Y/N]:  Prior or Outpatient Records Reviewed [Y/N]:

## 2023-07-16 NOTE — PROGRESS NOTE ADULT - ASSESSMENT
69 yo M with a pmhx of DMII, CAD s/p CABG(2015), HTN, HLD, SHx of B/L shoulder and Lt. knee replacement presents with RT 1st digit swelling s/p I&D of paronychia on 7/12    #RT 1st digit swelling with concern for skin and soft tissue infection vs OM  - pt previously presented with a paronychia on the same finger s/p I&D on 7/12  - failed Augmentin 3 days op  - x-ray showed no fractures/dislocation  - CT hand: Cortical erosive changes and periosteal reaction at the distal aspect of the middle phalanx of the second digit. Associated soft tissue swelling. This may reflect early osteomyelitis versus erosive degenerative change.  - Concern for OM: ordered an MRI hand and re-consulted ortho  - ESR/CRP 40/24  - ID and Ortho recs appreciated  - continue on Vancomycin and ceftriaxone   - tylenol prn fever  - percot prn pain 7-10  - tramadol prn pain 4-6 for breakthrough  - ppi   - miralax for bowel regimen  - re-consulted ortho as might need a biopsy/debridement if OM  - trend esr/crp weekly  - f/u vanc trough     #DM II  - Lantus 24 units and lispro 2 units tid  - ISS  - Hba1c is 8.2  - lipid profile shows elevated ; unclear if truly done while fasting      #CAD s/p CABG(2015)  #HTN/HLD/GERD  - c/w aspirin, losartan, statin    #BPH  - c/w tamsulosin    #Misc  -DVT prophylaxis: Lovenox  -GI prophylaxis: PPI  -Diet: DASH/CC  -Code status: Full  -Activity: AAT  -Dispo: MRI, ortho and ID followup

## 2023-07-16 NOTE — PROGRESS NOTE ADULT - SUBJECTIVE AND OBJECTIVE BOX
JAVI JUDD  68y, Male  Allergy: amoxicillin (Rash; Pruritus; Hives)      LOS  2d    CHIEF COMPLAINT:     INTERVAL EVENTS/HPI  - No acute events overnight  - T(F): , Max: 98 (07-15-23 @ 17:16)  - Denies any worsening symptoms  - Tolerating medication  - WBC Count: 6.40 (07-16-23 @ 06:21)  WBC Count: 7.20 (07-15-23 @ 07:30)     - Creatinine: 0.9 (07-16-23 @ 06:21)  Creatinine: 1.1 (07-15-23 @ 07:30)       ROS  General: Denies rigors, nightsweats  HEENT: Denies headache, rhinorrhea, sore throat, eye pain  CV: Denies CP, palpitations  PULM: Denies wheezing, hemoptysis  GI: Denies hematemesis, hematochezia, melena  : Denies discharge, hematuria  MSK: Denies arthralgias, myalgias  SKIN: Denies rash, lesions  NEURO: Denies paresthesias, weakness  PSYCH: Denies depression, anxiety    VITALS:  T(F): 97.9, Max: 98 (07-15-23 @ 17:16)  HR: 72  BP: 121/78  RR: 19Vital Signs Last 24 Hrs  T(C): 36.6 (16 Jul 2023 04:54), Max: 36.7 (15 Jul 2023 17:16)  T(F): 97.9 (16 Jul 2023 04:54), Max: 98 (15 Jul 2023 17:16)  HR: 72 (16 Jul 2023 04:54) (67 - 78)  BP: 121/78 (16 Jul 2023 04:54) (121/78 - 168/83)  BP(mean): --  RR: 19 (16 Jul 2023 04:54) (18 - 19)  SpO2: 98% (16 Jul 2023 04:54) (98% - 99%)    Parameters below as of 16 Jul 2023 04:54  Patient On (Oxygen Delivery Method): room air        PHYSICAL EXAM:  ***   FH: Non-contributory  Social Hx: Non-contributory    TESTS & MEASUREMENTS:                        13.3   6.40  )-----------( 161      ( 16 Jul 2023 06:21 )             41.2     07-16    136  |  102  |  11  ----------------------------<  244<H>  4.4   |  25  |  0.9    Ca    9.2      16 Jul 2023 06:21  Mg     1.6     07-16    TPro  6.2  /  Alb  3.7  /  TBili  0.3  /  DBili  x   /  AST  14  /  ALT  21  /  AlkPhos  78  07-16      LIVER FUNCTIONS - ( 16 Jul 2023 06:21 )  Alb: 3.7 g/dL / Pro: 6.2 g/dL / ALK PHOS: 78 U/L / ALT: 21 U/L / AST: 14 U/L / GGT: x           Urinalysis Basic - ( 16 Jul 2023 06:21 )    Color: x / Appearance: x / SG: x / pH: x  Gluc: 244 mg/dL / Ketone: x  / Bili: x / Urobili: x   Blood: x / Protein: x / Nitrite: x   Leuk Esterase: x / RBC: x / WBC x   Sq Epi: x / Non Sq Epi: x / Bacteria: x              INFECTIOUS DISEASES TESTING      INFLAMMATORY MARKERS  Sedimentation Rate, Erythrocyte: 37 mm/Hr (07-15-23 @ 07:30)  C-Reactive Protein, Serum: 18.6 mg/L (07-15-23 @ 07:30)  C-Reactive Protein, Serum: 24.3 mg/L (07-14-23 @ 21:38)  Sedimentation Rate, Erythrocyte: 40 mm/Hr (07-14-23 @ 21:38)      RADIOLOGY & ADDITIONAL TESTS:  I have personally reviewed the last available Chest xray  CXR      CT      CARDIOLOGY TESTING  12 Lead ECG:   Ventricular Rate 84 BPM    Atrial Rate 84 BPM    P-R Interval 198 ms    QRS Duration 86 ms    Q-T Interval 338 ms    QTC Calculation(Bazett) 399 ms    P Axis 66 degrees    R Axis 5 degrees    T Axis 24 degrees    Diagnosis Line Normal sinus rhythm with sinus arrhythmia  Inferior infarct , age undetermined  Abnormal ECG    Confirmed by Stephenie Velasquez MD ([id number]) on 7/15/2023 6:26:48 PM (07-15-23 @ 09:28)      MEDICATIONS  aspirin  chewable 81 Oral daily  atorvastatin 40 Oral at bedtime  dextrose 5%. 1000 IV Continuous <Continuous>  dextrose 5%. 1000 IV Continuous <Continuous>  dextrose 50% Injectable 25 IV Push once  dextrose 50% Injectable 12.5 IV Push once  dextrose 50% Injectable 25 IV Push once  enoxaparin Injectable 40 SubCutaneous every 24 hours  gabapentin 100 Oral three times a day  glucagon  Injectable 1 IntraMuscular once  insulin glargine Injectable (LANTUS) 24 SubCutaneous at bedtime  insulin lispro (ADMELOG) corrective regimen sliding scale  SubCutaneous three times a day before meals  losartan 25 Oral daily  magnesium sulfate  IVPB 2 IV Intermittent once  pantoprazole    Tablet 40 Oral before breakfast  tamsulosin 0.4 Oral at bedtime  vancomycin  IVPB 1250 IV Intermittent every 12 hours      WEIGHT  Weight (kg): 86.2 (07-14-23 @ 19:24)  Creatinine: 0.9 mg/dL (07-16-23 @ 06:21)      ANTIBIOTICS:  vancomycin  IVPB 1250 milliGRAM(s) IV Intermittent every 12 hours      All available historical records have been reviewed       JAVI JUDD  68y, Male  Allergy: amoxicillin (Rash; Pruritus; Hives)      LOS  2d    CHIEF COMPLAINT:     INTERVAL EVENTS/HPI  - No acute events overnight  - T(F): , Max: 98 (07-15-23 @ 17:16)  - Denies any worsening symptoms  - Tolerating medication  - WBC Count: 6.40 (07-16-23 @ 06:21)  WBC Count: 7.20 (07-15-23 @ 07:30)     - Creatinine: 0.9 (07-16-23 @ 06:21)  Creatinine: 1.1 (07-15-23 @ 07:30)       ROS  General: Denies rigors, nightsweats  HEENT: Denies headache, rhinorrhea, sore throat, eye pain  CV: Denies CP, palpitations  PULM: Denies wheezing, hemoptysis  GI: Denies hematemesis, hematochezia, melena  : Denies discharge, hematuria  MSK: Denies arthralgias, myalgias  SKIN: Denies rash, lesions  NEURO: Denies paresthesias, weakness  PSYCH: Denies depression, anxiety    VITALS:  T(F): 97.9, Max: 98 (07-15-23 @ 17:16)  HR: 72  BP: 121/78  RR: 19Vital Signs Last 24 Hrs  T(C): 36.6 (16 Jul 2023 04:54), Max: 36.7 (15 Jul 2023 17:16)  T(F): 97.9 (16 Jul 2023 04:54), Max: 98 (15 Jul 2023 17:16)  HR: 72 (16 Jul 2023 04:54) (67 - 78)  BP: 121/78 (16 Jul 2023 04:54) (121/78 - 168/83)  BP(mean): --  RR: 19 (16 Jul 2023 04:54) (18 - 19)  SpO2: 98% (16 Jul 2023 04:54) (98% - 99%)    Parameters below as of 16 Jul 2023 04:54  Patient On (Oxygen Delivery Method): room air        PHYSICAL EXAM:  Gen: NAD, resting in bed  HEENT: Normocephalic, atraumatic  Neck: supple, no lymphadenopathy  CV: Regular rate & regular rhythm  Lungs: decreased BS at bases, no fremitus  Abdomen: Soft, BS present  Ext: Warm, well perfused  Neuro: non focal, awake  Skin: decreased finger edema/ erythema  Lines: no phlebitis   FH: Non-contributory  Social Hx: Non-contributory    TESTS & MEASUREMENTS:                        13.3   6.40  )-----------( 161      ( 16 Jul 2023 06:21 )             41.2     07-16    136  |  102  |  11  ----------------------------<  244<H>  4.4   |  25  |  0.9    Ca    9.2      16 Jul 2023 06:21  Mg     1.6     07-16    TPro  6.2  /  Alb  3.7  /  TBili  0.3  /  DBili  x   /  AST  14  /  ALT  21  /  AlkPhos  78  07-16      LIVER FUNCTIONS - ( 16 Jul 2023 06:21 )  Alb: 3.7 g/dL / Pro: 6.2 g/dL / ALK PHOS: 78 U/L / ALT: 21 U/L / AST: 14 U/L / GGT: x           Urinalysis Basic - ( 16 Jul 2023 06:21 )    Color: x / Appearance: x / SG: x / pH: x  Gluc: 244 mg/dL / Ketone: x  / Bili: x / Urobili: x   Blood: x / Protein: x / Nitrite: x   Leuk Esterase: x / RBC: x / WBC x   Sq Epi: x / Non Sq Epi: x / Bacteria: x              INFECTIOUS DISEASES TESTING      INFLAMMATORY MARKERS  Sedimentation Rate, Erythrocyte: 37 mm/Hr (07-15-23 @ 07:30)  C-Reactive Protein, Serum: 18.6 mg/L (07-15-23 @ 07:30)  C-Reactive Protein, Serum: 24.3 mg/L (07-14-23 @ 21:38)  Sedimentation Rate, Erythrocyte: 40 mm/Hr (07-14-23 @ 21:38)      RADIOLOGY & ADDITIONAL TESTS:  I have personally reviewed the last available Chest xray  CXR      CT      CARDIOLOGY TESTING  12 Lead ECG:   Ventricular Rate 84 BPM    Atrial Rate 84 BPM    P-R Interval 198 ms    QRS Duration 86 ms    Q-T Interval 338 ms    QTC Calculation(Bazett) 399 ms    P Axis 66 degrees    R Axis 5 degrees    T Axis 24 degrees    Diagnosis Line Normal sinus rhythm with sinus arrhythmia  Inferior infarct , age undetermined  Abnormal ECG    Confirmed by Stephenie Velasquez MD ([id number]) on 7/15/2023 6:26:48 PM (07-15-23 @ 09:28)      MEDICATIONS  aspirin  chewable 81 Oral daily  atorvastatin 40 Oral at bedtime  dextrose 5%. 1000 IV Continuous <Continuous>  dextrose 5%. 1000 IV Continuous <Continuous>  dextrose 50% Injectable 25 IV Push once  dextrose 50% Injectable 12.5 IV Push once  dextrose 50% Injectable 25 IV Push once  enoxaparin Injectable 40 SubCutaneous every 24 hours  gabapentin 100 Oral three times a day  glucagon  Injectable 1 IntraMuscular once  insulin glargine Injectable (LANTUS) 24 SubCutaneous at bedtime  insulin lispro (ADMELOG) corrective regimen sliding scale  SubCutaneous three times a day before meals  losartan 25 Oral daily  magnesium sulfate  IVPB 2 IV Intermittent once  pantoprazole    Tablet 40 Oral before breakfast  tamsulosin 0.4 Oral at bedtime  vancomycin  IVPB 1250 IV Intermittent every 12 hours      WEIGHT  Weight (kg): 86.2 (07-14-23 @ 19:24)  Creatinine: 0.9 mg/dL (07-16-23 @ 06:21)      ANTIBIOTICS:  vancomycin  IVPB 1250 milliGRAM(s) IV Intermittent every 12 hours      All available historical records have been reviewed

## 2023-07-16 NOTE — PROGRESS NOTE ADULT - ASSESSMENT
ORTHOPAEDIC SURGERY PROGRESS NOTE    Patient seen and examined at bedside this afternoon. Patient still complaining of right IF pain and swelling. Denies f/c/n/v/sob/chest pain    PE  NAD  breathing comfortably on RA    R IF  Increased swelling and pain over proximal nail fold  Fluctuance noted  Compartments soft and compressible  DIP/PIP intact  Sensation radial and ulnar digital nerve intact  Radial pulse 2+, hand wwp     ESR/CRP 40/24  Hba1c 8.2    A/P  68M with R IF paronychia that is worsening with new fluctuance    Bedside I&D performed under local anesthesia. 1 cm incision over dorsal aspect of distal phalanx with 1 ml of purulence expressed. Wound cultures taken. Wound irrigated with betadine/normal saline, wound packed with 1/4'' packing, dressed with gauze, doris    -Plan is to re-assess tomorrow AM and remove packing and start warm soapy soaks TID for 15-20 minutes  -Please discontinue MRI as it is not needed at this time  -Follow-up wound cultures  -Continue IV antibiotics  -F/u ID recommendations  -Continue pain medication as needed  -Trend ESR/CRP

## 2023-07-17 LAB
A1C WITH ESTIMATED AVERAGE GLUCOSE RESULT: 8.3 % — HIGH (ref 4–5.6)
ALBUMIN SERPL ELPH-MCNC: 3.9 G/DL — SIGNIFICANT CHANGE UP (ref 3.5–5.2)
ALP SERPL-CCNC: 81 U/L — SIGNIFICANT CHANGE UP (ref 30–115)
ALT FLD-CCNC: 20 U/L — SIGNIFICANT CHANGE UP (ref 0–41)
ANION GAP SERPL CALC-SCNC: 8 MMOL/L — SIGNIFICANT CHANGE UP (ref 7–14)
AST SERPL-CCNC: 12 U/L — SIGNIFICANT CHANGE UP (ref 0–41)
BASOPHILS # BLD AUTO: 0.01 K/UL — SIGNIFICANT CHANGE UP (ref 0–0.2)
BASOPHILS NFR BLD AUTO: 0.2 % — SIGNIFICANT CHANGE UP (ref 0–1)
BILIRUB SERPL-MCNC: 0.9 MG/DL — SIGNIFICANT CHANGE UP (ref 0.2–1.2)
BUN SERPL-MCNC: 13 MG/DL — SIGNIFICANT CHANGE UP (ref 10–20)
CALCIUM SERPL-MCNC: 9.1 MG/DL — SIGNIFICANT CHANGE UP (ref 8.4–10.4)
CHLORIDE SERPL-SCNC: 101 MMOL/L — SIGNIFICANT CHANGE UP (ref 98–110)
CO2 SERPL-SCNC: 26 MMOL/L — SIGNIFICANT CHANGE UP (ref 17–32)
CREAT SERPL-MCNC: 1 MG/DL — SIGNIFICANT CHANGE UP (ref 0.7–1.5)
EGFR: 82 ML/MIN/1.73M2 — SIGNIFICANT CHANGE UP
EOSINOPHIL # BLD AUTO: 0.14 K/UL — SIGNIFICANT CHANGE UP (ref 0–0.7)
EOSINOPHIL NFR BLD AUTO: 2.6 % — SIGNIFICANT CHANGE UP (ref 0–8)
ESTIMATED AVERAGE GLUCOSE: 192 MG/DL — HIGH (ref 68–114)
GLUCOSE BLDC GLUCOMTR-MCNC: 179 MG/DL — HIGH (ref 70–99)
GLUCOSE BLDC GLUCOMTR-MCNC: 205 MG/DL — HIGH (ref 70–99)
GLUCOSE BLDC GLUCOMTR-MCNC: 224 MG/DL — HIGH (ref 70–99)
GLUCOSE BLDC GLUCOMTR-MCNC: 227 MG/DL — HIGH (ref 70–99)
GLUCOSE SERPL-MCNC: 256 MG/DL — HIGH (ref 70–99)
HCT VFR BLD CALC: 40.7 % — LOW (ref 42–52)
HGB BLD-MCNC: 13.3 G/DL — LOW (ref 14–18)
IMM GRANULOCYTES NFR BLD AUTO: 0.2 % — SIGNIFICANT CHANGE UP (ref 0.1–0.3)
LYMPHOCYTES # BLD AUTO: 1.72 K/UL — SIGNIFICANT CHANGE UP (ref 1.2–3.4)
LYMPHOCYTES # BLD AUTO: 31.6 % — SIGNIFICANT CHANGE UP (ref 20.5–51.1)
MAGNESIUM SERPL-MCNC: 1.8 MG/DL — SIGNIFICANT CHANGE UP (ref 1.8–2.4)
MCHC RBC-ENTMCNC: 28.7 PG — SIGNIFICANT CHANGE UP (ref 27–31)
MCHC RBC-ENTMCNC: 32.7 G/DL — SIGNIFICANT CHANGE UP (ref 32–37)
MCV RBC AUTO: 87.9 FL — SIGNIFICANT CHANGE UP (ref 80–94)
MONOCYTES # BLD AUTO: 0.54 K/UL — SIGNIFICANT CHANGE UP (ref 0.1–0.6)
MONOCYTES NFR BLD AUTO: 9.9 % — HIGH (ref 1.7–9.3)
NEUTROPHILS # BLD AUTO: 3.03 K/UL — SIGNIFICANT CHANGE UP (ref 1.4–6.5)
NEUTROPHILS NFR BLD AUTO: 55.5 % — SIGNIFICANT CHANGE UP (ref 42.2–75.2)
NRBC # BLD: 0 /100 WBCS — SIGNIFICANT CHANGE UP (ref 0–0)
PLATELET # BLD AUTO: 154 K/UL — SIGNIFICANT CHANGE UP (ref 130–400)
PMV BLD: 12.4 FL — HIGH (ref 7.4–10.4)
POTASSIUM SERPL-MCNC: 4.4 MMOL/L — SIGNIFICANT CHANGE UP (ref 3.5–5)
POTASSIUM SERPL-SCNC: 4.4 MMOL/L — SIGNIFICANT CHANGE UP (ref 3.5–5)
PROT SERPL-MCNC: 6.4 G/DL — SIGNIFICANT CHANGE UP (ref 6–8)
RBC # BLD: 4.63 M/UL — LOW (ref 4.7–6.1)
RBC # FLD: 14 % — SIGNIFICANT CHANGE UP (ref 11.5–14.5)
SODIUM SERPL-SCNC: 135 MMOL/L — SIGNIFICANT CHANGE UP (ref 135–146)
VANCOMYCIN TROUGH SERPL-MCNC: 11.5 UG/ML — HIGH (ref 5–10)
WBC # BLD: 5.45 K/UL — SIGNIFICANT CHANGE UP (ref 4.8–10.8)
WBC # FLD AUTO: 5.45 K/UL — SIGNIFICANT CHANGE UP (ref 4.8–10.8)

## 2023-07-17 PROCEDURE — 73220 MRI UPPR EXTREMITY W/O&W/DYE: CPT | Mod: 26,RT

## 2023-07-17 PROCEDURE — 99232 SBSQ HOSP IP/OBS MODERATE 35: CPT

## 2023-07-17 PROCEDURE — 99233 SBSQ HOSP IP/OBS HIGH 50: CPT

## 2023-07-17 RX ORDER — INSULIN LISPRO 100/ML
4 VIAL (ML) SUBCUTANEOUS
Refills: 0 | Status: DISCONTINUED | OUTPATIENT
Start: 2023-07-17 | End: 2023-07-18

## 2023-07-17 RX ORDER — INSULIN GLARGINE 100 [IU]/ML
26 INJECTION, SOLUTION SUBCUTANEOUS AT BEDTIME
Refills: 0 | Status: DISCONTINUED | OUTPATIENT
Start: 2023-07-17 | End: 2023-07-18

## 2023-07-17 RX ORDER — SENNA PLUS 8.6 MG/1
2 TABLET ORAL AT BEDTIME
Refills: 0 | Status: DISCONTINUED | OUTPATIENT
Start: 2023-07-17 | End: 2023-07-22

## 2023-07-17 RX ORDER — MAGNESIUM SULFATE 500 MG/ML
1 VIAL (ML) INJECTION ONCE
Refills: 0 | Status: DISCONTINUED | OUTPATIENT
Start: 2023-07-17 | End: 2023-07-20

## 2023-07-17 RX ORDER — VANCOMYCIN HCL 1 G
1000 VIAL (EA) INTRAVENOUS EVERY 12 HOURS
Refills: 0 | Status: DISCONTINUED | OUTPATIENT
Start: 2023-07-18 | End: 2023-07-22

## 2023-07-17 RX ADMIN — Medication 1: at 19:29

## 2023-07-17 RX ADMIN — LOSARTAN POTASSIUM 25 MILLIGRAM(S): 100 TABLET, FILM COATED ORAL at 05:13

## 2023-07-17 RX ADMIN — ATORVASTATIN CALCIUM 40 MILLIGRAM(S): 80 TABLET, FILM COATED ORAL at 21:45

## 2023-07-17 RX ADMIN — SENNA PLUS 2 TABLET(S): 8.6 TABLET ORAL at 21:44

## 2023-07-17 RX ADMIN — Medication 4 UNIT(S): at 12:04

## 2023-07-17 RX ADMIN — ENOXAPARIN SODIUM 40 MILLIGRAM(S): 100 INJECTION SUBCUTANEOUS at 12:05

## 2023-07-17 RX ADMIN — Medication 4 UNIT(S): at 19:29

## 2023-07-17 RX ADMIN — Medication 81 MILLIGRAM(S): at 12:05

## 2023-07-17 RX ADMIN — INSULIN GLARGINE 26 UNIT(S): 100 INJECTION, SOLUTION SUBCUTANEOUS at 21:45

## 2023-07-17 RX ADMIN — Medication 2: at 12:04

## 2023-07-17 RX ADMIN — CEFTRIAXONE 100 MILLIGRAM(S): 500 INJECTION, POWDER, FOR SOLUTION INTRAMUSCULAR; INTRAVENOUS at 19:30

## 2023-07-17 RX ADMIN — Medication 2: at 08:21

## 2023-07-17 RX ADMIN — TAMSULOSIN HYDROCHLORIDE 0.4 MILLIGRAM(S): 0.4 CAPSULE ORAL at 21:45

## 2023-07-17 RX ADMIN — PANTOPRAZOLE SODIUM 40 MILLIGRAM(S): 20 TABLET, DELAYED RELEASE ORAL at 05:13

## 2023-07-17 RX ADMIN — Medication 166.67 MILLIGRAM(S): at 05:13

## 2023-07-17 NOTE — PHARMACOTHERAPY INTERVENTION NOTE - COMMENTS
Recommended dose adjusting vancomycin 1.25g IV q12h to vancomycin 1g IV q12h in the setting of a vancomycin trough level of 11.5mcg/mL. Utilizing [employer], a Bayesian pharmacokinetic modeling program, the patient's predicted future vancomycin AUC when continuing vancomycin 1.25g IV q12h is ~620, which is slightly above the goal range of 400-600. A vancomycin regimen of 1g IV q12h is predicted to result in a future vancomycin AUC of ~500.    Kiel Witt PharmD  Clinical Pharmacy Specialist, Infectious Diseases  Tele-Antimicrobial Stewardship Program (Tele-[facility])  Tele-ASP Phone: [phone]

## 2023-07-17 NOTE — PHARMACOTHERAPY INTERVENTION NOTE - COMMENTS
Modified amoxicillin allergy history to state that patient tolerated ceftriaxone during this admission.  Patient had allergic reaction to amoxicillin several years. Discussed with Dr. Morris how patient recently tolerated amoxicillin-clavulanate and ampicillin-sulbactam. Amoxicillin allergy history removed from patient's chart.

## 2023-07-17 NOTE — PROGRESS NOTE ADULT - ASSESSMENT
67 yo M with a pmhx of DMII, CAD s/p CABG(2015), HTN, HLD, SHx of B/L shoulder and Lt. knee replacement presents with RT 1st digit swelling s/p I&D of paronychia on 7/12    #RT 1st digit swelling with concern for skin and soft tissue infection vs OM  - pt previously presented with a paronychia on the same finger s/p I&D on 7/12  - failed Augmentin 3 days op  - x-ray showed no fractures/dislocation  - CT hand: Cortical erosive changes and periosteal reaction at the distal aspect of the middle phalanx of the second digit. Associated soft tissue swelling. This may reflect early osteomyelitis versus erosive degenerative change.  - Concern for OM: ordered an MRI hand and re-consulted ortho  - ESR/CRP 40/24  - ID and Ortho recs appreciated  - continue on Vancomycin and ceftriaxone   - tylenol prn fever  - percot prn pain 7-10  - tramadol prn pain 4-6 for breakthrough  - ppi   - miralax for bowel regimen  -MRI pending  - trend esr/crp weekly  - f/u vanc trough     #DM II  - Lantus 24 units and lispro 4 units tid  - ISS  - Hba1c is 8.2  - lipid profile shows elevated ; unclear if truly done while fasting      #CAD s/p CABG(2015)  #HTN/HLD/GERD  - c/w aspirin, losartan, statin    #BPH  - c/w tamsulosin    #Misc  -DVT prophylaxis: Lovenox  -GI prophylaxis: PPI  -Diet: DASH/CC  -Code status: Full  -Activity: AAT  -Dispo: Vancomycin level, MRI, ortho and ID followup

## 2023-07-17 NOTE — PHARMACOTHERAPY INTERVENTION NOTE - COMMENTS
As per policy, ordered a stat vancomycin trough level as the vancomycin level from 7/17 @16:00 has yet to be collected.    Kiel Witt, PharmD  Clinical Pharmacy Specialist, Infectious Diseases  Tele-Antimicrobial Stewardship Program (Tele-[facility])  Tele-ASP Phone: [phone]

## 2023-07-17 NOTE — PHARMACOTHERAPY INTERVENTION NOTE - COMMENTS
Patient ordered vancomycin 1250mg IV q12h for the treatment of R 1st finger cellulitis r/o OM per Dr. Pearce. Vancomycin level ordered for this evening, prior to the fifth dose of this regimen. Since [employer] Bayesian dosing software predicts a supratherapeutic steady-state AUC/SAROJ of 765.91 mg/L*hr (goal 400-600) on this regimen, recommended discontinuing vancomycin 1250mg IV q12h, and initiating new vancomycin dosing regimen after this afternoon's level results.

## 2023-07-17 NOTE — PROGRESS NOTE ADULT - SUBJECTIVE AND OBJECTIVE BOX
24H events:  Patient is a 68y old Male who presents with a chief complaint of R IF PARONYCHIA (16 Jul 2023 16:39)    Primary diagnosis of Infection of finger    Today is hospital day 3d. This morning patient was seen and examined at bedside, resting comfortably in bed.    No acute or major events overnight.Patient noted down improvement in his pain, from 10/10 to 5/10.      PAST MEDICAL & SURGICAL HISTORY  ASHD (arteriosclerotic heart disease)    Hypertension, unspecified type    Hyperlipidemia, unspecified hyperlipidemia type    Type 2 diabetes mellitus with complication, unspecified long term insulin use status    History of open heart surgery  CABG x 4, 5 years ago        SOCIAL HISTORY:  Social History:  Lives with family  Retired  Former smoker, quit smoking in 2015 (15 Jul 2023 03:38)      ALLERGIES:  No Known Allergies      MEDICATIONS:  STANDING MEDICATIONS  aspirin  chewable 81 milliGRAM(s) Oral daily  atorvastatin 40 milliGRAM(s) Oral at bedtime  cefTRIAXone   IVPB      cefTRIAXone   IVPB 2000 milliGRAM(s) IV Intermittent every 24 hours  dextrose 5%. 1000 milliLiter(s) IV Continuous <Continuous>  dextrose 5%. 1000 milliLiter(s) IV Continuous <Continuous>  dextrose 50% Injectable 25 Gram(s) IV Push once  dextrose 50% Injectable 25 Gram(s) IV Push once  dextrose 50% Injectable 12.5 Gram(s) IV Push once  enoxaparin Injectable 40 milliGRAM(s) SubCutaneous every 24 hours  glucagon  Injectable 1 milliGRAM(s) IntraMuscular once  insulin glargine Injectable (LANTUS) 26 Unit(s) SubCutaneous at bedtime  insulin lispro (ADMELOG) corrective regimen sliding scale   SubCutaneous three times a day before meals  insulin lispro Injectable (ADMELOG) 4 Unit(s) SubCutaneous three times a day before meals  losartan 25 milliGRAM(s) Oral daily  magnesium sulfate  IVPB 1 Gram(s) IV Intermittent once  pantoprazole    Tablet 40 milliGRAM(s) Oral before breakfast  polyethylene glycol 3350 17 Gram(s) Oral daily  tamsulosin 0.4 milliGRAM(s) Oral at bedtime    PRN MEDICATIONS  acetaminophen     Tablet .. 650 milliGRAM(s) Oral every 6 hours PRN  dextrose Oral Gel 15 Gram(s) Oral once PRN  melatonin 3 milliGRAM(s) Oral at bedtime PRN  oxycodone    5 mG/acetaminophen 325 mG 1 Tablet(s) Oral every 6 hours PRN  traMADol 25 milliGRAM(s) Oral every 4 hours PRN      VITALS:   T(C): 35.7 (07-17-23 @ 14:07), Max: 36.6 (07-16-23 @ 20:48)  HR: 78 (07-17-23 @ 14:07) (69 - 78)  BP: 123/68 (07-17-23 @ 14:07) (123/68 - 147/67)  RR: 18 (07-17-23 @ 14:07) (18 - 18)  SpO2: 99% (07-17-23 @ 14:07) (94% - 99%)  I&O's Summary      PHYSICAL EXAM:    CHEST/LUNG: B/L good air entry; No rhonchi or wheezing.  HEART: S1S2 normal, Regular rate and rhythm; No murmurs.  ABDOMEN: Soft, Nontender, Nondistended; Bowel sounds present.  EXTREMITIES:  No edema.      LABS:                        13.3   5.45  )-----------( 154      ( 17 Jul 2023 07:22 )             40.7     07-17    135  |  101  |  13  ----------------------------<  256<H>  4.4   |  26  |  1.0    Ca    9.1      17 Jul 2023 07:22  Mg     1.8     07-17    TPro  6.4  /  Alb  3.9  /  TBili  0.9  /  DBili  x   /  AST  12  /  ALT  20  /  AlkPhos  81  07-17      Urinalysis Basic - ( 17 Jul 2023 07:22 )    Color: x / Appearance: x / SG: x / pH: x  Gluc: 256 mg/dL / Ketone: x  / Bili: x / Urobili: x   Blood: x / Protein: x / Nitrite: x   Leuk Esterase: x / RBC: x / WBC x   Sq Epi: x / Non Sq Epi: x / Bacteria: x

## 2023-07-17 NOTE — PHARMACOTHERAPY INTERVENTION NOTE - COMMENTS
Recommended obtaining a repeat vancomycin trough level prior to the 4th overall dose of vancomycin 1g IV q12h to assist with further vancomycin dosing optimization.    Norma BryanD  Clinical Pharmacy Specialist, Infectious Diseases  Tele-Antimicrobial Stewardship Program (Tele-[facility])  Tele-ASP Phone: [phone]

## 2023-07-17 NOTE — PHARMACOTHERAPY INTERVENTION NOTE - COMMENTS
As per policy, discontinued the duplicate vancomycin trough levels ordered for 7/17 @16:00 in the setting of the vancomycin trough level already being collected.    Norma BryanD  Clinical Pharmacy Specialist, Infectious Diseases  Tele-Antimicrobial Stewardship Program (Tele-[facility])  Tele-ASP Phone: [phone]

## 2023-07-17 NOTE — PROGRESS NOTE ADULT - ASSESSMENT
ORTHOPAEDIC SURGERY PROGRESS NOTE    Patient seen and examined at bedside this afternoon. Patient still complaining of right IF pain and swelling, but improved. Denies f/c/n/v/sob/chest pain    PE  NAD  breathing comfortably on RA    R IF  1 cm incision over dorsal aspect of distal phalanx  Packing removed  No purulence expressed  Swelling and pain over proximal nail fold  Compartments soft and compressible  DIP/PIP intact  Sensation radial and ulnar digital nerve intact  Radial pulse 2+, hand wwp     ESR/CRP 40/24  Hba1c 8.2    A/P  68M with R IF paronychia. Bedside I&D performed on 7/16 with 1 ml of purulence expressed    -Packing removed this AM  -Please start warm soapy soaks TID for 15-20 minutes  -Please discontinue MRI as it is not needed at this time  -Follow-up wound cultures  -Continue IV antibiotics  -F/u ID recommendations  -Continue pain medication as needed  -Trend ESR/CRP

## 2023-07-18 LAB
ALBUMIN SERPL ELPH-MCNC: 4.1 G/DL — SIGNIFICANT CHANGE UP (ref 3.5–5.2)
ALP SERPL-CCNC: 85 U/L — SIGNIFICANT CHANGE UP (ref 30–115)
ALT FLD-CCNC: 38 U/L — SIGNIFICANT CHANGE UP (ref 0–41)
ANION GAP SERPL CALC-SCNC: 11 MMOL/L — SIGNIFICANT CHANGE UP (ref 7–14)
AST SERPL-CCNC: 27 U/L — SIGNIFICANT CHANGE UP (ref 0–41)
BASOPHILS # BLD AUTO: 0.03 K/UL — SIGNIFICANT CHANGE UP (ref 0–0.2)
BASOPHILS NFR BLD AUTO: 0.5 % — SIGNIFICANT CHANGE UP (ref 0–1)
BILIRUB SERPL-MCNC: 0.3 MG/DL — SIGNIFICANT CHANGE UP (ref 0.2–1.2)
BUN SERPL-MCNC: 15 MG/DL — SIGNIFICANT CHANGE UP (ref 10–20)
CALCIUM SERPL-MCNC: 9.6 MG/DL — SIGNIFICANT CHANGE UP (ref 8.4–10.5)
CHLORIDE SERPL-SCNC: 100 MMOL/L — SIGNIFICANT CHANGE UP (ref 98–110)
CO2 SERPL-SCNC: 24 MMOL/L — SIGNIFICANT CHANGE UP (ref 17–32)
CREAT SERPL-MCNC: 1 MG/DL — SIGNIFICANT CHANGE UP (ref 0.7–1.5)
CRP SERPL-MCNC: 11.2 MG/L — HIGH
EGFR: 82 ML/MIN/1.73M2 — SIGNIFICANT CHANGE UP
EOSINOPHIL # BLD AUTO: 0.2 K/UL — SIGNIFICANT CHANGE UP (ref 0–0.7)
EOSINOPHIL NFR BLD AUTO: 3.6 % — SIGNIFICANT CHANGE UP (ref 0–8)
ERYTHROCYTE [SEDIMENTATION RATE] IN BLOOD: 26 MM/HR — HIGH (ref 0–10)
GLUCOSE BLDC GLUCOMTR-MCNC: 148 MG/DL — HIGH (ref 70–99)
GLUCOSE BLDC GLUCOMTR-MCNC: 151 MG/DL — HIGH (ref 70–99)
GLUCOSE BLDC GLUCOMTR-MCNC: 217 MG/DL — HIGH (ref 70–99)
GLUCOSE BLDC GLUCOMTR-MCNC: 238 MG/DL — HIGH (ref 70–99)
GLUCOSE SERPL-MCNC: 238 MG/DL — HIGH (ref 70–99)
HCT VFR BLD CALC: 43.9 % — SIGNIFICANT CHANGE UP (ref 42–52)
HGB BLD-MCNC: 14.7 G/DL — SIGNIFICANT CHANGE UP (ref 14–18)
IMM GRANULOCYTES NFR BLD AUTO: 0.2 % — SIGNIFICANT CHANGE UP (ref 0.1–0.3)
LYMPHOCYTES # BLD AUTO: 2.01 K/UL — SIGNIFICANT CHANGE UP (ref 1.2–3.4)
LYMPHOCYTES # BLD AUTO: 35.7 % — SIGNIFICANT CHANGE UP (ref 20.5–51.1)
MAGNESIUM SERPL-MCNC: 1.8 MG/DL — SIGNIFICANT CHANGE UP (ref 1.8–2.4)
MCHC RBC-ENTMCNC: 29.4 PG — SIGNIFICANT CHANGE UP (ref 27–31)
MCHC RBC-ENTMCNC: 33.5 G/DL — SIGNIFICANT CHANGE UP (ref 32–37)
MCV RBC AUTO: 87.8 FL — SIGNIFICANT CHANGE UP (ref 80–94)
MONOCYTES # BLD AUTO: 0.57 K/UL — SIGNIFICANT CHANGE UP (ref 0.1–0.6)
MONOCYTES NFR BLD AUTO: 10.1 % — HIGH (ref 1.7–9.3)
MRSA PCR RESULT.: NEGATIVE — SIGNIFICANT CHANGE UP
NEUTROPHILS # BLD AUTO: 2.81 K/UL — SIGNIFICANT CHANGE UP (ref 1.4–6.5)
NEUTROPHILS NFR BLD AUTO: 49.9 % — SIGNIFICANT CHANGE UP (ref 42.2–75.2)
NRBC # BLD: 0 /100 WBCS — SIGNIFICANT CHANGE UP (ref 0–0)
PHOSPHATE SERPL-MCNC: 3 MG/DL — SIGNIFICANT CHANGE UP (ref 2.1–4.9)
PLATELET # BLD AUTO: 176 K/UL — SIGNIFICANT CHANGE UP (ref 130–400)
PMV BLD: 12.2 FL — HIGH (ref 7.4–10.4)
POTASSIUM SERPL-MCNC: 4.4 MMOL/L — SIGNIFICANT CHANGE UP (ref 3.5–5)
POTASSIUM SERPL-SCNC: 4.4 MMOL/L — SIGNIFICANT CHANGE UP (ref 3.5–5)
PROT SERPL-MCNC: 7 G/DL — SIGNIFICANT CHANGE UP (ref 6–8)
RBC # BLD: 5 M/UL — SIGNIFICANT CHANGE UP (ref 4.7–6.1)
RBC # FLD: 14 % — SIGNIFICANT CHANGE UP (ref 11.5–14.5)
SODIUM SERPL-SCNC: 135 MMOL/L — SIGNIFICANT CHANGE UP (ref 135–146)
WBC # BLD: 5.63 K/UL — SIGNIFICANT CHANGE UP (ref 4.8–10.8)
WBC # FLD AUTO: 5.63 K/UL — SIGNIFICANT CHANGE UP (ref 4.8–10.8)

## 2023-07-18 PROCEDURE — 99233 SBSQ HOSP IP/OBS HIGH 50: CPT

## 2023-07-18 PROCEDURE — 99232 SBSQ HOSP IP/OBS MODERATE 35: CPT

## 2023-07-18 RX ORDER — INSULIN GLARGINE 100 [IU]/ML
28 INJECTION, SOLUTION SUBCUTANEOUS AT BEDTIME
Refills: 0 | Status: DISCONTINUED | OUTPATIENT
Start: 2023-07-18 | End: 2023-07-22

## 2023-07-18 RX ORDER — INSULIN LISPRO 100/ML
6 VIAL (ML) SUBCUTANEOUS
Refills: 0 | Status: DISCONTINUED | OUTPATIENT
Start: 2023-07-18 | End: 2023-07-22

## 2023-07-18 RX ADMIN — Medication 2: at 12:20

## 2023-07-18 RX ADMIN — Medication 2: at 08:45

## 2023-07-18 RX ADMIN — POLYETHYLENE GLYCOL 3350 17 GRAM(S): 17 POWDER, FOR SOLUTION ORAL at 12:20

## 2023-07-18 RX ADMIN — INSULIN GLARGINE 28 UNIT(S): 100 INJECTION, SOLUTION SUBCUTANEOUS at 22:41

## 2023-07-18 RX ADMIN — Medication 81 MILLIGRAM(S): at 12:20

## 2023-07-18 RX ADMIN — ENOXAPARIN SODIUM 40 MILLIGRAM(S): 100 INJECTION SUBCUTANEOUS at 12:20

## 2023-07-18 RX ADMIN — Medication 250 MILLIGRAM(S): at 22:40

## 2023-07-18 RX ADMIN — TRAMADOL HYDROCHLORIDE 25 MILLIGRAM(S): 50 TABLET ORAL at 08:44

## 2023-07-18 RX ADMIN — CEFTRIAXONE 100 MILLIGRAM(S): 500 INJECTION, POWDER, FOR SOLUTION INTRAMUSCULAR; INTRAVENOUS at 14:42

## 2023-07-18 RX ADMIN — ATORVASTATIN CALCIUM 40 MILLIGRAM(S): 80 TABLET, FILM COATED ORAL at 22:28

## 2023-07-18 RX ADMIN — TAMSULOSIN HYDROCHLORIDE 0.4 MILLIGRAM(S): 0.4 CAPSULE ORAL at 22:28

## 2023-07-18 RX ADMIN — TRAMADOL HYDROCHLORIDE 25 MILLIGRAM(S): 50 TABLET ORAL at 09:27

## 2023-07-18 RX ADMIN — SENNA PLUS 2 TABLET(S): 8.6 TABLET ORAL at 22:27

## 2023-07-18 RX ADMIN — LOSARTAN POTASSIUM 25 MILLIGRAM(S): 100 TABLET, FILM COATED ORAL at 05:34

## 2023-07-18 RX ADMIN — PANTOPRAZOLE SODIUM 40 MILLIGRAM(S): 20 TABLET, DELAYED RELEASE ORAL at 05:34

## 2023-07-18 RX ADMIN — Medication 6 UNIT(S): at 12:21

## 2023-07-18 RX ADMIN — Medication 6 UNIT(S): at 17:27

## 2023-07-18 RX ADMIN — Medication 4 UNIT(S): at 08:46

## 2023-07-18 RX ADMIN — Medication 250 MILLIGRAM(S): at 00:15

## 2023-07-18 NOTE — PROGRESS NOTE ADULT - ASSESSMENT
69 yo M with a pmhx of DMII, CAD s/p CABG(2015), HTN, HLD, SHx of B/L shoulder and Lt. knee replacement presents with RT 1st digit swelling s/p I&D of paronychia on 7/12    #RT 1st digit swelling with concern for skin and soft tissue infection  - pt previously presented with a paronychia on the same finger s/p I&D on 7/12  - failed Augmentin 3 days op  - x-ray showed no fractures/dislocation  - CT hand: Cortical erosive changes and periosteal reaction at the distal aspect of the middle phalanx of the second digit. Associated soft tissue swelling. This may reflect early osteomyelitis versus erosive degenerative change.  - MRI hand was indeterminate for OM; demonstrated soft tissue infection  - Ortho declined a bone biopsy  - wound culture not sent (?)  - ESR/CRP 40/24  - ID and Ortho recs appreciated  - ID recommended that picc line be placed with vancomycin and ceftriaxone for 3 weeks and then po bactrim  - continue on Vancomycin and ceftriaxone   - tylenol prn fever  - percocet prn pain 7-10  - tramadol prn pain 4-6 for breakthrough  - ppi   - miralax for bowel regimen  - trend esr/crp weekly  - f/u vanc trough   - ID recommended that picc line be placed with vancomycin and ceftriaxone for 3 weeks and then po bactrim  - warm soapy soaks TID for 15-20 minutes    #DM II  - Lantus 28 units and lispro 6 units tid  - ISS  - Hba1c is 8.2  - lipid profile shows elevated ; unclear if truly done while fasting      #CAD s/p CABG(2015)  #HTN/HLD/GERD  - c/w aspirin, losartan, statin    #BPH  - c/w tamsulosin    #Misc  -DVT prophylaxis: Lovenox  -GI prophylaxis: PPI  -Diet: DASH/CC  -Code status: Full  -Activity: AAT    Handoff:   - follow up FS and adjust insulin as needed  -  f/u vanc trough   - Pt needs picc line vs midline for 3 weeks vancomycin and ceftriaxone for 3 weeks and then po bactrim  - warm soapy soaks TID for 15-20 minutes  - op f/u with ID and ortho  - monitor for BMs while on opioids

## 2023-07-18 NOTE — PROGRESS NOTE ADULT - SUBJECTIVE AND OBJECTIVE BOX
Plastic Surgery    SUBJECTIVE: Pt seen and examined on rounds with team. No acute events overnight.        OBJECTIVE    PHYSICAL EXAM:   General: NAD, Lying in bed   Neuro: Awake and alert  Ext: right IF with small area of skin breakdown over dorsum of finger, localized erythema and tenderness. No fluctuance    VITALS  T(C): 36.1 (07-18-23 @ 12:51), Max: 36.2 (07-18-23 @ 04:46)  HR: 68 (07-18-23 @ 12:51) (68 - 81)  BP: 141/83 (07-18-23 @ 12:51) (123/68 - 162/98)  RR: 16 (07-18-23 @ 12:51) (16 - 18)  SpO2: 98% (07-18-23 @ 12:51) (98% - 99%)  CAPILLARY BLOOD GLUCOSE      POCT Blood Glucose.: 217 mg/dL (18 Jul 2023 12:05)  POCT Blood Glucose.: 238 mg/dL (18 Jul 2023 08:33)  POCT Blood Glucose.: 227 mg/dL (17 Jul 2023 21:08)  POCT Blood Glucose.: 179 mg/dL (17 Jul 2023 19:18)      Is/Os      MEDICATIONS (STANDING): aspirin  chewable 81 milliGRAM(s) Oral daily  atorvastatin 40 milliGRAM(s) Oral at bedtime  cefTRIAXone   IVPB      cefTRIAXone   IVPB 2000 milliGRAM(s) IV Intermittent every 24 hours  dextrose 5%. 1000 milliLiter(s) IV Continuous <Continuous>  dextrose 5%. 1000 milliLiter(s) IV Continuous <Continuous>  dextrose 50% Injectable 25 Gram(s) IV Push once  dextrose 50% Injectable 12.5 Gram(s) IV Push once  dextrose 50% Injectable 25 Gram(s) IV Push once  enoxaparin Injectable 40 milliGRAM(s) SubCutaneous every 24 hours  glucagon  Injectable 1 milliGRAM(s) IntraMuscular once  insulin glargine Injectable (LANTUS) 28 Unit(s) SubCutaneous at bedtime  insulin lispro (ADMELOG) corrective regimen sliding scale   SubCutaneous three times a day before meals  insulin lispro Injectable (ADMELOG) 6 Unit(s) SubCutaneous three times a day before meals  losartan 25 milliGRAM(s) Oral daily  magnesium sulfate  IVPB 1 Gram(s) IV Intermittent once  pantoprazole    Tablet 40 milliGRAM(s) Oral before breakfast  polyethylene glycol 3350 17 Gram(s) Oral daily  senna 2 Tablet(s) Oral at bedtime  tamsulosin 0.4 milliGRAM(s) Oral at bedtime  vancomycin  IVPB 1000 milliGRAM(s) IV Intermittent every 12 hours    MEDICATIONS (PRN):acetaminophen     Tablet .. 650 milliGRAM(s) Oral every 6 hours PRN Temp greater or equal to 38C (100.4F), Mild Pain (1 - 3)  dextrose Oral Gel 15 Gram(s) Oral once PRN Blood Glucose LESS THAN 70 milliGRAM(s)/deciliter  melatonin 3 milliGRAM(s) Oral at bedtime PRN Insomnia  oxycodone    5 mG/acetaminophen 325 mG 1 Tablet(s) Oral every 6 hours PRN Severe Pain (7 - 10)  traMADol 25 milliGRAM(s) Oral every 4 hours PRN Moderate Pain (4 - 6)      LABS  CBC (07-18 @ 08:17)                              14.7                           5.63    )----------------(  176        49.9  % Neutrophils, 35.7  % Lymphocytes, ANC: 2.81                                43.9    CBC (07-17 @ 07:22)                              13.3<L>                         5.45    )----------------(  154        55.5  % Neutrophils, 31.6  % Lymphocytes, ANC: 3.03                                40.7<L>    BMP (07-18 @ 08:17)             135     |  100     |  15    		Ca++ --      Ca 9.6                ---------------------------------( 238<H>		Mg 1.8                4.4     |  24      |  1.0   			Ph 3.0     BMP (07-17 @ 07:22)             135     |  101     |  13    		Ca++ --      Ca 9.1                ---------------------------------( 256<H>		Mg 1.8                4.4     |  26      |  1.0   			Ph --        LFTs (07-18 @ 08:17)      TPro 7.0 / Alb 4.1 / TBili 0.3 / DBili -- / AST 27 / ALT 38 / AlkPhos 85  LFTs (07-17 @ 07:22)      TPro 6.4 / Alb 3.9 / TBili 0.9 / DBili -- / AST 12 / ALT 20 / AlkPhos 81              IMAGING STUDIES

## 2023-07-18 NOTE — PROGRESS NOTE ADULT - ASSESSMENT
68M with R IF paronychia. Bedside I&D performed on 7/16 with 1 ml of purulence expressed    -warm soapy soaks TID for 15-20 minutes  -MRI indet for OM  - no indication for bone biopsy. C/w abx. No additional surgical intervention at this time.  -Follow-up wound cultures  -Continue IV antibiotics  -F/u ID recommendations  -Continue pain medication as needed

## 2023-07-18 NOTE — PROGRESS NOTE ADULT - SUBJECTIVE AND OBJECTIVE BOX
24H events:  Patient is a 68y old Male who presents with a chief complaint of R IF PARONYCHIA (16 Jul 2023 16:39)    Primary diagnosis of Infection of finger    Pt's flexion at the pip has improved but still healing. Pain has improved and at 3/10      PAST MEDICAL & SURGICAL HISTORY  ASHD (arteriosclerotic heart disease)    Hypertension, unspecified type    Hyperlipidemia, unspecified hyperlipidemia type    Type 2 diabetes mellitus with complication, unspecified long term insulin use status    History of open heart surgery  CABG x 4, 5 years ago        SOCIAL HISTORY:  Social History:  Lives with family  Retired  Former smoker, quit smoking in 2015 (15 Jul 2023 03:38)      ALLERGIES:  No Known Allergies      MEDICATIONS:  STANDING MEDICATIONS  aspirin  chewable 81 milliGRAM(s) Oral daily  atorvastatin 40 milliGRAM(s) Oral at bedtime  cefTRIAXone   IVPB      cefTRIAXone   IVPB 2000 milliGRAM(s) IV Intermittent every 24 hours  dextrose 5%. 1000 milliLiter(s) IV Continuous <Continuous>  dextrose 5%. 1000 milliLiter(s) IV Continuous <Continuous>  dextrose 50% Injectable 25 Gram(s) IV Push once  dextrose 50% Injectable 25 Gram(s) IV Push once  dextrose 50% Injectable 12.5 Gram(s) IV Push once  enoxaparin Injectable 40 milliGRAM(s) SubCutaneous every 24 hours  glucagon  Injectable 1 milliGRAM(s) IntraMuscular once  insulin glargine Injectable (LANTUS) 26 Unit(s) SubCutaneous at bedtime  insulin lispro (ADMELOG) corrective regimen sliding scale   SubCutaneous three times a day before meals  insulin lispro Injectable (ADMELOG) 4 Unit(s) SubCutaneous three times a day before meals  losartan 25 milliGRAM(s) Oral daily  magnesium sulfate  IVPB 1 Gram(s) IV Intermittent once  pantoprazole    Tablet 40 milliGRAM(s) Oral before breakfast  polyethylene glycol 3350 17 Gram(s) Oral daily  tamsulosin 0.4 milliGRAM(s) Oral at bedtime    PRN MEDICATIONS  acetaminophen     Tablet .. 650 milliGRAM(s) Oral every 6 hours PRN  dextrose Oral Gel 15 Gram(s) Oral once PRN  melatonin 3 milliGRAM(s) Oral at bedtime PRN  oxycodone    5 mG/acetaminophen 325 mG 1 Tablet(s) Oral every 6 hours PRN  traMADol 25 milliGRAM(s) Oral every 4 hours PRN      VITALS:   T(C): 35.7 (07-17-23 @ 14:07), Max: 36.6 (07-16-23 @ 20:48)  HR: 78 (07-17-23 @ 14:07) (69 - 78)  BP: 123/68 (07-17-23 @ 14:07) (123/68 - 147/67)  RR: 18 (07-17-23 @ 14:07) (18 - 18)  SpO2: 99% (07-17-23 @ 14:07) (94% - 99%)  I&O's Summary      PHYSICAL EXAM:  T(C): 36.1 (07-18-23 @ 12:51), Max: 36.2 (07-18-23 @ 04:46)  HR: 68 (07-18-23 @ 12:51) (68 - 81)  BP: 141/83 (07-18-23 @ 12:51) (123/68 - 162/98)  RR: 16 (07-18-23 @ 12:51) (16 - 18)  SpO2: 98% (07-18-23 @ 12:51) (98% - 99%)    CONSTITUTIONAL: Well groomed, no apparent distress  EYES: PERRLA and symmetric, EOMI, No conjunctival or scleral injection, non-icteric  ENMT: Oral mucosa with moist membranes. Normal dentition; no pharyngeal injection or exudates  RESP: No respiratory distress, no use of accessory muscles; CTA b/l, no WRR  CV: RRR, +S1S2, no MRG; no JVD; no peripheral edema  GI: Soft, NT, ND, no rebound, no guarding; no palpable masses; no hepatosplenomegaly; no hernia palpated  MSK: first rt finger swollen and purple with decreased movement in dip, improved flexion in pip and no effect on mtp  SKIN: No rashes or ulcers noted; no subcutaneous nodules or induration palpable  PSYCH: Appropriate insight/judgment; A+O x 3, mood and affect appropriate, recent/remote memory intact    LABS:                        13.3   5.45  )-----------( 154      ( 17 Jul 2023 07:22 )             40.7     07-17    135  |  101  |  13  ----------------------------<  256<H>  4.4   |  26  |  1.0    Ca    9.1      17 Jul 2023 07:22  Mg     1.8     07-17    TPro  6.4  /  Alb  3.9  /  TBili  0.9  /  DBili  x   /  AST  12  /  ALT  20  /  AlkPhos  81  07-17      Urinalysis Basic - ( 17 Jul 2023 07:22 )    Color: x / Appearance: x / SG: x / pH: x  Gluc: 256 mg/dL / Ketone: x  / Bili: x / Urobili: x   Blood: x / Protein: x / Nitrite: x   Leuk Esterase: x / RBC: x / WBC x   Sq Epi: x / Non Sq Epi: x / Bacteria: x

## 2023-07-18 NOTE — PROGRESS NOTE ADULT - SUBJECTIVE AND OBJECTIVE BOX
24H events:  Patient is a 68y old Male who presents with a chief complaint of R IF PARONYCHIA (16 Jul 2023 16:39)    Primary diagnosis of Infection of finger    Today is hospital day 4d. This morning patient was seen and examined at bedside, resting comfortably in bed.  Pt's flexion at the pip has improved. Pain has improved and at 3/10.  No fever no chills.   No acute or major events overnight.      PAST MEDICAL & SURGICAL HISTORY  ASHD (arteriosclerotic heart disease)    Hypertension, unspecified type    Hyperlipidemia, unspecified hyperlipidemia type    Type 2 diabetes mellitus with complication, unspecified long term insulin use status    History of open heart surgery  CABG x 4, 5 years ago        SOCIAL HISTORY:  Social History:  Lives with family  Retired  Former smoker, quit smoking in 2015 (15 Jul 2023 03:38)      ALLERGIES:  No Known Allergies      MEDICATIONS:  STANDING MEDICATIONS  aspirin  chewable 81 milliGRAM(s) Oral daily  atorvastatin 40 milliGRAM(s) Oral at bedtime  cefTRIAXone   IVPB      cefTRIAXone   IVPB 2000 milliGRAM(s) IV Intermittent every 24 hours  dextrose 5%. 1000 milliLiter(s) IV Continuous <Continuous>  dextrose 5%. 1000 milliLiter(s) IV Continuous <Continuous>  dextrose 50% Injectable 25 Gram(s) IV Push once  dextrose 50% Injectable 12.5 Gram(s) IV Push once  dextrose 50% Injectable 25 Gram(s) IV Push once  enoxaparin Injectable 40 milliGRAM(s) SubCutaneous every 24 hours  glucagon  Injectable 1 milliGRAM(s) IntraMuscular once  insulin glargine Injectable (LANTUS) 28 Unit(s) SubCutaneous at bedtime  insulin lispro (ADMELOG) corrective regimen sliding scale   SubCutaneous three times a day before meals  insulin lispro Injectable (ADMELOG) 6 Unit(s) SubCutaneous three times a day before meals  losartan 25 milliGRAM(s) Oral daily  magnesium sulfate  IVPB 1 Gram(s) IV Intermittent once  pantoprazole    Tablet 40 milliGRAM(s) Oral before breakfast  polyethylene glycol 3350 17 Gram(s) Oral daily  senna 2 Tablet(s) Oral at bedtime  tamsulosin 0.4 milliGRAM(s) Oral at bedtime  vancomycin  IVPB 1000 milliGRAM(s) IV Intermittent every 12 hours    PRN MEDICATIONS  acetaminophen     Tablet .. 650 milliGRAM(s) Oral every 6 hours PRN  dextrose Oral Gel 15 Gram(s) Oral once PRN  melatonin 3 milliGRAM(s) Oral at bedtime PRN  oxycodone    5 mG/acetaminophen 325 mG 1 Tablet(s) Oral every 6 hours PRN  traMADol 25 milliGRAM(s) Oral every 4 hours PRN      VITALS:   T(C): 36.1 (07-18-23 @ 12:51), Max: 36.2 (07-18-23 @ 04:46)  HR: 68 (07-18-23 @ 12:51) (68 - 81)  BP: 141/83 (07-18-23 @ 12:51) (125/71 - 162/98)  RR: 16 (07-18-23 @ 12:51) (16 - 18)  SpO2: 98% (07-18-23 @ 12:51) (98% - 98%)  I&O's Summary      PHYSICAL EXAM:    RESP: No respiratory distress, no use of accessory muscles; CTA b/l, no WRR  CV: RRR, +S1S2, no MRG; no JVD; no peripheral edema  GI: Soft, NT, ND, no rebound, no guarding; no palpable masses; no hepatosplenomegaly; no hernia palpated  MSK: first rt finger swollen and purple with decreased movement in dip, improved flexion in pip and no effect on mtp        LABS:                        14.7   5.63  )-----------( 176      ( 18 Jul 2023 08:17 )             43.9     07-18    135  |  100  |  15  ----------------------------<  238<H>  4.4   |  24  |  1.0    Ca    9.6      18 Jul 2023 08:17  Phos  3.0     07-18  Mg     1.8     07-18    TPro  7.0  /  Alb  4.1  /  TBili  0.3  /  DBili  x   /  AST  27  /  ALT  38  /  AlkPhos  85  07-18      Urinalysis Basic - ( 18 Jul 2023 08:17 )    Color: x / Appearance: x / SG: x / pH: x  Gluc: 238 mg/dL / Ketone: x  / Bili: x / Urobili: x   Blood: x / Protein: x / Nitrite: x   Leuk Esterase: x / RBC: x / WBC x   Sq Epi: x / Non Sq Epi: x / Bacteria: x        Sedimentation Rate, Erythrocyte: 26 mm/Hr *H* (07-18-23 @ 08:17)      Culture - Blood (collected 16 Jul 2023 14:07)  Source: .Blood Blood-Peripheral  Preliminary Report (17 Jul 2023 23:02):    No growth at 24 hours

## 2023-07-18 NOTE — PROGRESS NOTE ADULT - ATTENDING COMMENTS
Pt pending MRI hand to rule out OM. Spoke to ortho; will await MRI. Continue abx meanwhile. Pain control improved
Discussed with residents

## 2023-07-18 NOTE — PROGRESS NOTE ADULT - SUBJECTIVE AND OBJECTIVE BOX
JAVI JUDD  68y, Male  Allergy: No Known Allergies      LOS  4d    CHIEF COMPLAINT: R IF PARONYCHIA (16 Jul 2023 16:39)      INTERVAL EVENTS/HPI  - No acute events overnight  - T(F): , Max: 97.2 (07-18-23 @ 04:46)  - Tolerating medication  - WBC Count: 5.45 (07-17-23 @ 07:22)  WBC Count: 6.40 (07-16-23 @ 06:21)     - Creatinine: 1.0 (07-17-23 @ 07:22)       ROS  ***    VITALS:  T(F): 97.2, Max: 97.2 (07-18-23 @ 04:46)  HR: 80  BP: 162/98  RR: 18Vital Signs Last 24 Hrs  T(C): 36.2 (18 Jul 2023 04:46), Max: 36.2 (18 Jul 2023 04:46)  T(F): 97.2 (18 Jul 2023 04:46), Max: 97.2 (18 Jul 2023 04:46)  HR: 80 (18 Jul 2023 04:46) (78 - 81)  BP: 162/98 (18 Jul 2023 04:46) (123/68 - 162/98)  BP(mean): --  RR: 18 (17 Jul 2023 20:23) (18 - 18)  SpO2: 98% (18 Jul 2023 04:46) (98% - 99%)    Parameters below as of 18 Jul 2023 04:46  Patient On (Oxygen Delivery Method): room air        PHYSICAL EXAM:  ***    FH: Non-contributory  Social Hx: Non-contributory    TESTS & MEASUREMENTS:                        13.3   5.45  )-----------( 154      ( 17 Jul 2023 07:22 )             40.7     07-17    135  |  101  |  13  ----------------------------<  256<H>  4.4   |  26  |  1.0    Ca    9.1      17 Jul 2023 07:22  Mg     1.8     07-17    TPro  6.4  /  Alb  3.9  /  TBili  0.9  /  DBili  x   /  AST  12  /  ALT  20  /  AlkPhos  81  07-17      LIVER FUNCTIONS - ( 17 Jul 2023 07:22 )  Alb: 3.9 g/dL / Pro: 6.4 g/dL / ALK PHOS: 81 U/L / ALT: 20 U/L / AST: 12 U/L / GGT: x           Urinalysis Basic - ( 17 Jul 2023 07:22 )    Color: x / Appearance: x / SG: x / pH: x  Gluc: 256 mg/dL / Ketone: x  / Bili: x / Urobili: x   Blood: x / Protein: x / Nitrite: x   Leuk Esterase: x / RBC: x / WBC x   Sq Epi: x / Non Sq Epi: x / Bacteria: x        Culture - Blood (collected 07-16-23 @ 14:07)  Source: .Blood Blood-Peripheral  Preliminary Report (07-17-23 @ 23:02):    No growth at 24 hours            INFECTIOUS DISEASES TESTING  Vancomycin Level, Trough: 11.5 (07-17-23 @ 20:34)  strept    INFLAMMATORY MARKERS  Sedimentation Rate, Erythrocyte: 37 mm/Hr (07-15-23 @ 07:30)  C-Reactive Protein, Serum: 18.6 mg/L (07-15-23 @ 07:30)  C-Reactive Protein, Serum: 24.3 mg/L (07-14-23 @ 21:38)  Sedimentation Rate, Erythrocyte: 40 mm/Hr (07-14-23 @ 21:38)      RADIOLOGY & ADDITIONAL TESTS:  I have personally reviewed the last available Chest xray  CXR      CT      CARDIOLOGY TESTING  12 Lead ECG:   Ventricular Rate 84 BPM    Atrial Rate 84 BPM    P-R Interval 198 ms    QRS Duration 86 ms    Q-T Interval 338 ms    QTC Calculation(Bazett) 399 ms    P Axis 66 degrees    R Axis 5 degrees    T Axis 24 degrees    Diagnosis Line Normal sinus rhythm with sinus arrhythmia  Inferior infarct , age undetermined  Abnormal ECG    Confirmed by Stephenie Velasquez MD ([id number]) on 7/15/2023 6:26:48 PM (07-15-23 @ 09:28)      MEDICATIONS  aspirin  chewable 81 Oral daily  atorvastatin 40 Oral at bedtime  cefTRIAXone   IVPB     cefTRIAXone   IVPB 2000 IV Intermittent every 24 hours  dextrose 5%. 1000 IV Continuous <Continuous>  dextrose 5%. 1000 IV Continuous <Continuous>  dextrose 50% Injectable 25 IV Push once  dextrose 50% Injectable 12.5 IV Push once  dextrose 50% Injectable 25 IV Push once  enoxaparin Injectable 40 SubCutaneous every 24 hours  glucagon  Injectable 1 IntraMuscular once  insulin glargine Injectable (LANTUS) 28 SubCutaneous at bedtime  insulin lispro (ADMELOG) corrective regimen sliding scale  SubCutaneous three times a day before meals  insulin lispro Injectable (ADMELOG) 6 SubCutaneous three times a day before meals  losartan 25 Oral daily  magnesium sulfate  IVPB 1 IV Intermittent once  pantoprazole    Tablet 40 Oral before breakfast  polyethylene glycol 3350 17 Oral daily  senna 2 Oral at bedtime  tamsulosin 0.4 Oral at bedtime  vancomycin  IVPB 1000 IV Intermittent every 12 hours      WEIGHT  Weight (kg): 86.2 (07-14-23 @ 19:24)      ANTIBIOTICS:  cefTRIAXone   IVPB      cefTRIAXone   IVPB 2000 milliGRAM(s) IV Intermittent every 24 hours  vancomycin  IVPB 1000 milliGRAM(s) IV Intermittent every 12 hours      All available historical records have been reviewed       JAVI JUDD  68y, Male  Allergy: No Known Allergies      LOS  4d    CHIEF COMPLAINT: R IF PARONYCHIA (16 Jul 2023 16:39)      INTERVAL EVENTS/HPI  - No acute events overnight  - T(F): , Max: 97.2 (07-18-23 @ 04:46)  - Tolerating medication  - WBC Count: 5.45 (07-17-23 @ 07:22)  WBC Count: 6.40 (07-16-23 @ 06:21)     - Creatinine: 1.0 (07-17-23 @ 07:22)       ROS  General: Denies rigors, nightsweats  HEENT: Denies headache, rhinorrhea, sore throat, eye pain  CV: Denies CP, palpitations  PULM: Denies wheezing, hemoptysis  GI: Denies hematemesis, hematochezia, melena  : Denies discharge, hematuria  MSK: Denies arthralgias, myalgias  SKIN: Denies rash, lesions  NEURO: Denies paresthesias, weakness  PSYCH: Denies depression, anxiety     VITALS:  T(F): 97.2, Max: 97.2 (07-18-23 @ 04:46)  HR: 80  BP: 162/98  RR: 18Vital Signs Last 24 Hrs  T(C): 36.2 (18 Jul 2023 04:46), Max: 36.2 (18 Jul 2023 04:46)  T(F): 97.2 (18 Jul 2023 04:46), Max: 97.2 (18 Jul 2023 04:46)  HR: 80 (18 Jul 2023 04:46) (78 - 81)  BP: 162/98 (18 Jul 2023 04:46) (123/68 - 162/98)  BP(mean): --  RR: 18 (17 Jul 2023 20:23) (18 - 18)  SpO2: 98% (18 Jul 2023 04:46) (98% - 99%)    Parameters below as of 18 Jul 2023 04:46  Patient On (Oxygen Delivery Method): room air        PHYSICAL EXAM:  Gen: NAD, resting in bed  HEENT: Normocephalic, atraumatic  Neck: supple, no lymphadenopathy  CV: Regular rate & regular rhythm  Lungs: decreased BS at bases, no fremitus  Abdomen: Soft, BS present  Ext: Warm, well perfused  Neuro: non focal, awake  Skin: decreased edema/ erythema of the finger  Lines: no phlebitis     FH: Non-contributory  Social Hx: Non-contributory    TESTS & MEASUREMENTS:                        13.3   5.45  )-----------( 154      ( 17 Jul 2023 07:22 )             40.7     07-17    135  |  101  |  13  ----------------------------<  256<H>  4.4   |  26  |  1.0    Ca    9.1      17 Jul 2023 07:22  Mg     1.8     07-17    TPro  6.4  /  Alb  3.9  /  TBili  0.9  /  DBili  x   /  AST  12  /  ALT  20  /  AlkPhos  81  07-17      LIVER FUNCTIONS - ( 17 Jul 2023 07:22 )  Alb: 3.9 g/dL / Pro: 6.4 g/dL / ALK PHOS: 81 U/L / ALT: 20 U/L / AST: 12 U/L / GGT: x           Urinalysis Basic - ( 17 Jul 2023 07:22 )    Color: x / Appearance: x / SG: x / pH: x  Gluc: 256 mg/dL / Ketone: x  / Bili: x / Urobili: x   Blood: x / Protein: x / Nitrite: x   Leuk Esterase: x / RBC: x / WBC x   Sq Epi: x / Non Sq Epi: x / Bacteria: x        Culture - Blood (collected 07-16-23 @ 14:07)  Source: .Blood Blood-Peripheral  Preliminary Report (07-17-23 @ 23:02):    No growth at 24 hours            INFECTIOUS DISEASES TESTING  Vancomycin Level, Trough: 11.5 (07-17-23 @ 20:34)  strept    INFLAMMATORY MARKERS  Sedimentation Rate, Erythrocyte: 37 mm/Hr (07-15-23 @ 07:30)  C-Reactive Protein, Serum: 18.6 mg/L (07-15-23 @ 07:30)  C-Reactive Protein, Serum: 24.3 mg/L (07-14-23 @ 21:38)  Sedimentation Rate, Erythrocyte: 40 mm/Hr (07-14-23 @ 21:38)      RADIOLOGY & ADDITIONAL TESTS:  I have personally reviewed the last available Chest xray  CXR      CT      CARDIOLOGY TESTING  12 Lead ECG:   Ventricular Rate 84 BPM    Atrial Rate 84 BPM    P-R Interval 198 ms    QRS Duration 86 ms    Q-T Interval 338 ms    QTC Calculation(Bazett) 399 ms    P Axis 66 degrees    R Axis 5 degrees    T Axis 24 degrees    Diagnosis Line Normal sinus rhythm with sinus arrhythmia  Inferior infarct , age undetermined  Abnormal ECG    Confirmed by Stephenie Velasquez MD ([id number]) on 7/15/2023 6:26:48 PM (07-15-23 @ 09:28)      MEDICATIONS  aspirin  chewable 81 Oral daily  atorvastatin 40 Oral at bedtime  cefTRIAXone   IVPB     cefTRIAXone   IVPB 2000 IV Intermittent every 24 hours  dextrose 5%. 1000 IV Continuous <Continuous>  dextrose 5%. 1000 IV Continuous <Continuous>  dextrose 50% Injectable 25 IV Push once  dextrose 50% Injectable 12.5 IV Push once  dextrose 50% Injectable 25 IV Push once  enoxaparin Injectable 40 SubCutaneous every 24 hours  glucagon  Injectable 1 IntraMuscular once  insulin glargine Injectable (LANTUS) 28 SubCutaneous at bedtime  insulin lispro (ADMELOG) corrective regimen sliding scale  SubCutaneous three times a day before meals  insulin lispro Injectable (ADMELOG) 6 SubCutaneous three times a day before meals  losartan 25 Oral daily  magnesium sulfate  IVPB 1 IV Intermittent once  pantoprazole    Tablet 40 Oral before breakfast  polyethylene glycol 3350 17 Oral daily  senna 2 Oral at bedtime  tamsulosin 0.4 Oral at bedtime  vancomycin  IVPB 1000 IV Intermittent every 12 hours      WEIGHT  Weight (kg): 86.2 (07-14-23 @ 19:24)      ANTIBIOTICS:  cefTRIAXone   IVPB      cefTRIAXone   IVPB 2000 milliGRAM(s) IV Intermittent every 24 hours  vancomycin  IVPB 1000 milliGRAM(s) IV Intermittent every 12 hours      All available historical records have been reviewed

## 2023-07-18 NOTE — PROGRESS NOTE ADULT - ASSESSMENT
69 yo M with a pmhx of DMII, CAD s/p CABG(2015), HTN, HLD, SHx of B/L shoulder and Lt. knee replacement presents with RT 1st digit swelling admitted for suspected cellulitis.     #RT 2nd finger swelling # cellulitis     - pt previously presented with a paronychia on the same finger s/p I&D on 7/12  - failed Augmentin 3 days op  - x-ray showed no fractures/dislocation  - CT hand: Cortical erosive changes and periosteal reaction at the distal aspect of the middle phalanx of the second digit. Associated soft tissue swelling. This may reflect early osteomyelitis versus erosive degenerative change.  - MRI hand was indeterminate for OM; demonstrated soft tissue infection  - Ortho declined a bone biopsy  - wound culture not sent (?)  - ESR/CRP 40/24  - ID and Ortho recs appreciated  - ID recommended that picc line be placed with vancomycin and ceftriaxone for 3 weeks and then po bactrim  - continue on Vancomycin and ceftriaxone   - tylenol prn fever  - percocet prn pain 7-10  - tramadol prn pain 4-6 for breakthrough  - ppi   - miralax for bowel regimen  - trend esr/crp weekly  - f/u vanc trough on 7/19 am  - warm soapy soaks TID for 15-20 minutes  -MRSA nares negative     #DM II  - Lantus 28 units and lispro 6 units tid  - ISS  - Hba1c is 8.2  - lipid profile shows elevated ; unclear if truly done while fasting      #CAD s/p CABG(2015)  #HTN/HLD/GERD  - c/w aspirin, losartan, statin    #BPH  - c/w tamsulosin    #Misc  -DVT prophylaxis: Lovenox  -GI prophylaxis: PPI  -Diet: DASH/CC  -Code status: Full  -Activity: AAT    Handoff:   - follow up FS and adjust insulin as needed  -  f/u vanc trough   - Pt needs picc line vs midline for 3 weeks vancomycin and ceftriaxone for 3 weeks and then po bactrim, IR consulted   - warm soapy soaks TID for 15-20 minutes  - op f/u with ID and ortho  - monitor for BMs while on opioids

## 2023-07-18 NOTE — PROGRESS NOTE ADULT - ASSESSMENT
ASSESSMENT  68 yrs old M with PMHx of  DM II on insulin, CAD s/p CABG(2015), HTN, HLD, SHx of B/L shoulder and Lt. knee replacement, presents with right index finger swelling and pain.  Pt. reports that about 5-6 days ago he started feeling burning pain followed by a rash over the proximal part of the finger nail. Over next few days it bacme more swollen so he initially presented to the ED on 7/12/2023, when he underwent bedside I&D by ED with blood and purulent drainage and was discharged the same day on PO Augmentin. Patient returned to ED today, because pain and swelling of his right IF was not improving. Patient states he was taking his PO antibiotics.     IMPRESSION  #R 1st finger paronychia, cellulitis   s/p I&D- no WCX  denies any trauma, bite, etc to the finger  < from: MR Hand w/wo IV Cont, Right (07.17.23 @ 17:40) >  Cellulitis of the distal index finger, with an incision noted at the nailbed. No drainable abscess. No definite septic arthritis. Mild signal change within the distal phalanx is indeterminate for osteomyelitis.  < from: CT Hand w/ IV Cont, Right (07.15.23 @ 22:47) >  Question of cortical erosive changes and periosteal reaction at the   distal aspect of the middle phalanx of the second digit. Associated soft   tissue swelling. This may reflect early osteomyelitis versus erosive   degenerative change. Consider follow-up MRI with IV contrast.  No locules of air to indicate a gas-forming infectious process. No   abscess.  < from: Xray Hand 3 Views, Right (07.14.23 @ 21:57) >  No evidence of acute fracture or dislocation.  Sedimentation Rate, Erythrocyte: 37 mm/Hr (07-15-23 @ 07:30)  C-Reactive Protein, Serum: 18.6 mg/L (07-15-23 @ 07:30)  C-Reactive Protein, Serum: 24.3 mg/L (07-14-23 @ 21:38)  Sedimentation Rate, Erythrocyte: 40 mm/Hr (07-14-23 @ 21:38)  #Sepsis ruled out on admission   #Obesity BMI (kg/m2): 30.5, 32.2  #IDDM   #Abx allergy: amoxicillin (Rash; Pruritus; Hives)  crcl 67  Creatinine: 1.1 (07-15-23 @ 07:30)    Height (cm): 168 (07-14-23 @ 19:24)  Weight (kg): 86.2 (07-14-23 @ 19:24)    RECOMMENDATIONS  - Send MRSA nares   - MRI  indeterminate for osteomyelitis  - Vanc dosing per ID pharmacy   - Ceftriaxone 2g q24h IV  - No WCX sent from I&D  - Appreciate Ortho consult    If any questions, please send a message or call on [employer] Teams  Please continue to update ID with any pertinent new laboratory or radiographic findings  #[id number]   ASSESSMENT  68 yrs old M with PMHx of  DM II on insulin, CAD s/p CABG(2015), HTN, HLD, SHx of B/L shoulder and Lt. knee replacement, presents with right index finger swelling and pain.  Pt. reports that about 5-6 days ago he started feeling burning pain followed by a rash over the proximal part of the finger nail. Over next few days it bacme more swollen so he initially presented to the ED on 7/12/2023, when he underwent bedside I&D by ED with blood and purulent drainage and was discharged the same day on PO Augmentin. Patient returned to ED today, because pain and swelling of his right IF was not improving. Patient states he was taking his PO antibiotics.     IMPRESSION  #R 1st finger paronychia, cellulitis , possible osteomyelitis (MRI inconclusive)  s/p I&D- no WCX  denies any trauma, bite, etc to the finger  < from: MR Hand w/wo IV Cont, Right (07.17.23 @ 17:40) >  Cellulitis of the distal index finger, with an incision noted at the nailbed. No drainable abscess. No definite septic arthritis. Mild signal change within the distal phalanx is indeterminate for osteomyelitis.  < from: CT Hand w/ IV Cont, Right (07.15.23 @ 22:47) >  Question of cortical erosive changes and periosteal reaction at the   distal aspect of the middle phalanx of the second digit. Associated soft   tissue swelling. This may reflect early osteomyelitis versus erosive   degenerative change. Consider follow-up MRI with IV contrast.  No locules of air to indicate a gas-forming infectious process. No   abscess.  < from: Xray Hand 3 Views, Right (07.14.23 @ 21:57) >  No evidence of acute fracture or dislocation.  Sedimentation Rate, Erythrocyte: 37 mm/Hr (07-15-23 @ 07:30)  C-Reactive Protein, Serum: 18.6 mg/L (07-15-23 @ 07:30)  C-Reactive Protein, Serum: 24.3 mg/L (07-14-23 @ 21:38)  Sedimentation Rate, Erythrocyte: 40 mm/Hr (07-14-23 @ 21:38)  #Sepsis ruled out on admission   #Obesity BMI (kg/m2): 30.5, 32.2  #IDDM   a1c 8.3  #Abx allergy: amoxicillin (Rash; Pruritus; Hives)  crcl 67  Creatinine: 1.1 (07-15-23 @ 07:30)    Height (cm): 168 (07-14-23 @ 19:24)  Weight (kg): 86.2 (07-14-23 @ 19:24)    RECOMMENDATIONS  - Send MRSA nares   - MRI  indeterminate for osteomyelitis  - Vanc dosing per ID pharmacy   - Ceftriaxone 2g q24h IV  - No WCX sent from I&D  - Appreciate Ortho consult  - Spoke to daughter, discussed MRI inconclusive, discussed possible IV antibiotics at home. Discussed risks/benefits of IV/PO therapy/OVIVA trial. Given DM, I prefer to start with IV course and can transition to PO. Daughter would like to think about it and will inform the team.     If any questions, please send a message or call on [employer] Teams  Please continue to update ID with any pertinent new laboratory or radiographic findings  #[id number]

## 2023-07-19 LAB
ALBUMIN SERPL ELPH-MCNC: 4.1 G/DL — SIGNIFICANT CHANGE UP (ref 3.5–5.2)
ALP SERPL-CCNC: 85 U/L — SIGNIFICANT CHANGE UP (ref 30–115)
ALT FLD-CCNC: 53 U/L — HIGH (ref 0–41)
ANION GAP SERPL CALC-SCNC: 9 MMOL/L — SIGNIFICANT CHANGE UP (ref 7–14)
AST SERPL-CCNC: 38 U/L — SIGNIFICANT CHANGE UP (ref 0–41)
BASOPHILS # BLD AUTO: 0.02 K/UL — SIGNIFICANT CHANGE UP (ref 0–0.2)
BASOPHILS NFR BLD AUTO: 0.3 % — SIGNIFICANT CHANGE UP (ref 0–1)
BILIRUB SERPL-MCNC: 0.3 MG/DL — SIGNIFICANT CHANGE UP (ref 0.2–1.2)
BUN SERPL-MCNC: 15 MG/DL — SIGNIFICANT CHANGE UP (ref 10–20)
CALCIUM SERPL-MCNC: 9.6 MG/DL — SIGNIFICANT CHANGE UP (ref 8.4–10.5)
CHLORIDE SERPL-SCNC: 99 MMOL/L — SIGNIFICANT CHANGE UP (ref 98–110)
CO2 SERPL-SCNC: 23 MMOL/L — SIGNIFICANT CHANGE UP (ref 17–32)
CREAT SERPL-MCNC: 1 MG/DL — SIGNIFICANT CHANGE UP (ref 0.7–1.5)
EGFR: 82 ML/MIN/1.73M2 — SIGNIFICANT CHANGE UP
EOSINOPHIL # BLD AUTO: 0.21 K/UL — SIGNIFICANT CHANGE UP (ref 0–0.7)
EOSINOPHIL NFR BLD AUTO: 3.4 % — SIGNIFICANT CHANGE UP (ref 0–8)
GLUCOSE BLDC GLUCOMTR-MCNC: 196 MG/DL — HIGH (ref 70–99)
GLUCOSE BLDC GLUCOMTR-MCNC: 217 MG/DL — HIGH (ref 70–99)
GLUCOSE BLDC GLUCOMTR-MCNC: 224 MG/DL — HIGH (ref 70–99)
GLUCOSE BLDC GLUCOMTR-MCNC: 225 MG/DL — HIGH (ref 70–99)
GLUCOSE SERPL-MCNC: 205 MG/DL — HIGH (ref 70–99)
HCT VFR BLD CALC: 44.6 % — SIGNIFICANT CHANGE UP (ref 42–52)
HGB BLD-MCNC: 14.6 G/DL — SIGNIFICANT CHANGE UP (ref 14–18)
IMM GRANULOCYTES NFR BLD AUTO: 0.2 % — SIGNIFICANT CHANGE UP (ref 0.1–0.3)
LYMPHOCYTES # BLD AUTO: 1.99 K/UL — SIGNIFICANT CHANGE UP (ref 1.2–3.4)
LYMPHOCYTES # BLD AUTO: 32.3 % — SIGNIFICANT CHANGE UP (ref 20.5–51.1)
MAGNESIUM SERPL-MCNC: 1.9 MG/DL — SIGNIFICANT CHANGE UP (ref 1.8–2.4)
MCHC RBC-ENTMCNC: 28.6 PG — SIGNIFICANT CHANGE UP (ref 27–31)
MCHC RBC-ENTMCNC: 32.7 G/DL — SIGNIFICANT CHANGE UP (ref 32–37)
MCV RBC AUTO: 87.5 FL — SIGNIFICANT CHANGE UP (ref 80–94)
MONOCYTES # BLD AUTO: 0.51 K/UL — SIGNIFICANT CHANGE UP (ref 0.1–0.6)
MONOCYTES NFR BLD AUTO: 8.3 % — SIGNIFICANT CHANGE UP (ref 1.7–9.3)
NEUTROPHILS # BLD AUTO: 3.42 K/UL — SIGNIFICANT CHANGE UP (ref 1.4–6.5)
NEUTROPHILS NFR BLD AUTO: 55.5 % — SIGNIFICANT CHANGE UP (ref 42.2–75.2)
NRBC # BLD: 0 /100 WBCS — SIGNIFICANT CHANGE UP (ref 0–0)
PHOSPHATE SERPL-MCNC: 3.1 MG/DL — SIGNIFICANT CHANGE UP (ref 2.1–4.9)
PLATELET # BLD AUTO: 174 K/UL — SIGNIFICANT CHANGE UP (ref 130–400)
PMV BLD: 11.8 FL — HIGH (ref 7.4–10.4)
POTASSIUM SERPL-MCNC: 4.4 MMOL/L — SIGNIFICANT CHANGE UP (ref 3.5–5)
POTASSIUM SERPL-SCNC: 4.4 MMOL/L — SIGNIFICANT CHANGE UP (ref 3.5–5)
PROT SERPL-MCNC: 6.8 G/DL — SIGNIFICANT CHANGE UP (ref 6–8)
RBC # BLD: 5.1 M/UL — SIGNIFICANT CHANGE UP (ref 4.7–6.1)
RBC # FLD: 13.9 % — SIGNIFICANT CHANGE UP (ref 11.5–14.5)
SODIUM SERPL-SCNC: 131 MMOL/L — LOW (ref 135–146)
VANCOMYCIN TROUGH SERPL-MCNC: 12.1 UG/ML — HIGH (ref 5–10)
WBC # BLD: 6.16 K/UL — SIGNIFICANT CHANGE UP (ref 4.8–10.8)
WBC # FLD AUTO: 6.16 K/UL — SIGNIFICANT CHANGE UP (ref 4.8–10.8)

## 2023-07-19 PROCEDURE — 99233 SBSQ HOSP IP/OBS HIGH 50: CPT

## 2023-07-19 PROCEDURE — 99232 SBSQ HOSP IP/OBS MODERATE 35: CPT

## 2023-07-19 RX ADMIN — Medication 250 MILLIGRAM(S): at 09:36

## 2023-07-19 RX ADMIN — Medication 6 UNIT(S): at 17:04

## 2023-07-19 RX ADMIN — CEFTRIAXONE 100 MILLIGRAM(S): 500 INJECTION, POWDER, FOR SOLUTION INTRAMUSCULAR; INTRAVENOUS at 16:39

## 2023-07-19 RX ADMIN — Medication 81 MILLIGRAM(S): at 11:58

## 2023-07-19 RX ADMIN — ATORVASTATIN CALCIUM 40 MILLIGRAM(S): 80 TABLET, FILM COATED ORAL at 22:22

## 2023-07-19 RX ADMIN — Medication 6 UNIT(S): at 08:28

## 2023-07-19 RX ADMIN — Medication 250 MILLIGRAM(S): at 23:36

## 2023-07-19 RX ADMIN — SENNA PLUS 2 TABLET(S): 8.6 TABLET ORAL at 22:23

## 2023-07-19 RX ADMIN — LOSARTAN POTASSIUM 25 MILLIGRAM(S): 100 TABLET, FILM COATED ORAL at 06:26

## 2023-07-19 RX ADMIN — Medication 1: at 08:27

## 2023-07-19 RX ADMIN — TAMSULOSIN HYDROCHLORIDE 0.4 MILLIGRAM(S): 0.4 CAPSULE ORAL at 22:24

## 2023-07-19 RX ADMIN — POLYETHYLENE GLYCOL 3350 17 GRAM(S): 17 POWDER, FOR SOLUTION ORAL at 11:58

## 2023-07-19 RX ADMIN — PANTOPRAZOLE SODIUM 40 MILLIGRAM(S): 20 TABLET, DELAYED RELEASE ORAL at 06:26

## 2023-07-19 RX ADMIN — INSULIN GLARGINE 28 UNIT(S): 100 INJECTION, SOLUTION SUBCUTANEOUS at 22:22

## 2023-07-19 RX ADMIN — Medication 2: at 11:58

## 2023-07-19 RX ADMIN — Medication 6 UNIT(S): at 11:59

## 2023-07-19 RX ADMIN — ENOXAPARIN SODIUM 40 MILLIGRAM(S): 100 INJECTION SUBCUTANEOUS at 11:58

## 2023-07-19 RX ADMIN — Medication 2: at 17:03

## 2023-07-19 NOTE — PROGRESS NOTE ADULT - ASSESSMENT
68M with R IF paronychia. Bedside I&D performed on 7/16 with 1 ml of purulence expressed    -warm soapy soaks TID for 15-20 minutes  - no indication for bone biopsy. C/w abx. No additional surgical intervention at this time.  -F/u ID recommendations    Demetrius Lopez  Plastic and Reconstructive Surgery, PGY-3  [facility]: [id number]  [facility]: [phone]  [facility]: Spectra [phone] (Green team)

## 2023-07-19 NOTE — PHARMACOTHERAPY INTERVENTION NOTE - COMMENTS
Patient ordered vancomycin 1000mg IV q12h for the treatment of R 1st finger cellulitis r/o OM per Dr. Pearce. Vancomycin level on 7/19 at 0716 was 12.1 mg/L. Current regimen of vancomycin 1000mg IV q12h predicts a therapeutic steady-state AUC/SAROJ of 520.06 mg/L*hr (goal 400-600). Recommended continuing vancomycin 1000mg IV q12h, and obtaining a repeat vancomycin trough level on 7/24 with AM labs to reassess as long as renal function remains stable. If SCr increases to 1.3 mg/dL or greater, please obtain a vancomycin trough level sooner.

## 2023-07-19 NOTE — PROGRESS NOTE ADULT - ASSESSMENT
ASSESSMENT  68 yrs old M with PMHx of  DM II on insulin, CAD s/p CABG(2015), HTN, HLD, SHx of B/L shoulder and Lt. knee replacement, presents with right index finger swelling and pain.  Pt. reports that about 5-6 days ago he started feeling burning pain followed by a rash over the proximal part of the finger nail. Over next few days it bacme more swollen so he initially presented to the ED on 7/12/2023, when he underwent bedside I&D by ED with blood and purulent drainage and was discharged the same day on PO Augmentin. Patient returned to ED today, because pain and swelling of his right IF was not improving. Patient states he was taking his PO antibiotics.     IMPRESSION  #R 1st finger paronychia, cellulitis , possible osteomyelitis (MRI inconclusive)  s/p I&D- no WCX  denies any trauma, bite, etc to the finger  < from: MR Hand w/wo IV Cont, Right (07.17.23 @ 17:40) >  Cellulitis of the distal index finger, with an incision noted at the nailbed. No drainable abscess. No definite septic arthritis. Mild signal change within the distal phalanx is indeterminate for osteomyelitis.  < from: CT Hand w/ IV Cont, Right (07.15.23 @ 22:47) >  Question of cortical erosive changes and periosteal reaction at the   distal aspect of the middle phalanx of the second digit. Associated soft   tissue swelling. This may reflect early osteomyelitis versus erosive   degenerative change. Consider follow-up MRI with IV contrast.  No locules of air to indicate a gas-forming infectious process. No   abscess.  MRSA PCR Result.: Negative (07-18-23 @ 11:05)  < from: Xray Hand 3 Views, Right (07.14.23 @ 21:57) >  No evidence of acute fracture or dislocation.  Sedimentation Rate, Erythrocyte: 37 mm/Hr (07-15-23 @ 07:30)  C-Reactive Protein, Serum: 18.6 mg/L (07-15-23 @ 07:30)  C-Reactive Protein, Serum: 24.3 mg/L (07-14-23 @ 21:38)  Sedimentation Rate, Erythrocyte: 40 mm/Hr (07-14-23 @ 21:38)  #Sepsis ruled out on admission   #Obesity BMI (kg/m2): 30.5, 32.2  #IDDM   a1c 8.3  #Abx allergy: amoxicillin (Rash; Pruritus; Hives)  crcl 67  Creatinine: 1.1 (07-15-23 @ 07:30)    Height (cm): 168 (07-14-23 @ 19:24)  Weight (kg): 86.2 (07-14-23 @ 19:24)    RECOMMENDATIONS  - MRI  indeterminate for osteomyelitis  - Vanc dosing per ID pharmacy   - Ceftriaxone 2g q24h IV  - No WCX sent from I&D  - Appreciate Ortho/Plastics consult  - Spoke to daughter, discussed MRI inconclusive, discussed possible IV antibiotics at home. Discussed risks/benefits of IV/PO therapy/OVIVA trial. Given DM, I prefer to start with IV course and can transition to PO.   - Daughter would like to think about it and will inform the team. No answer this AM.  - If plan for IV will need a PICC (cannot given Vanc via midline), and Vanc/Ceftriaxone as above x 3 more weeks (will f/u ESR, CRP as outpatient , possible repeat MRI), possible PO to follow  - If Plan for PO- PO bactrim 1 DS BID to complete 4-6 weeks, monitor Cr/K  - Weekly CBC, CMP, ESR/CRP (+/- Vanc trough)  - ID follow-up with Dr. Harinder Bender for Telehealth. We will call the patient between 10:30-6:30      [address]       [phone]       Fax [phone]     If any questions, please send a message or call on [employer] Teams  Please continue to update ID with any pertinent new laboratory or radiographic findings  #[id number]

## 2023-07-19 NOTE — PROGRESS NOTE ADULT - ASSESSMENT
69 yo M with a pmhx of DMII, CAD s/p CABG(2015), HTN, HLD, SHx of B/L shoulder and Lt. knee replacement presents with RT 1st digit swelling admitted for suspected cellulitis.     #RT 2nd finger swelling # cellulitis     - pt previously presented with a paronychia on the same finger s/p I&D on 7/12  - failed Augmentin 3 days op  - x-ray showed no fractures/dislocation  - CT hand: Cortical erosive changes and periosteal reaction at the distal aspect of the middle phalanx of the second digit. Associated soft tissue swelling. This may reflect early osteomyelitis versus erosive degenerative change.  - MRI hand was indeterminate for OM; demonstrated soft tissue infection  - Ortho declined a bone biopsy  - wound culture not sent (?)  - ESR/CRP 40/24  - ID and Ortho recs appreciated  - ID recommended that picc line be placed with vancomycin and ceftriaxone for 3 weeks and then po bactrim  - plan for PICC line insertion tomorrow  - continue on Vancomycin and ceftriaxone   - tylenol prn fever  - percocet prn pain 7-10  - tramadol prn pain 4-6 for breakthrough  - ppi   - miralax for bowel regimen  - trend esr/crp weekly  - f/u vanc trough on 7/24 am  - if Cr > 1.3 vanc trough sooner  - warm soapy soaks TID for 15-20 minutes  -MRSA nares negative     #DM II  - Lantus 28 units and lispro 6 units tid  - ISS  - Hba1c is 8.2  - lipid profile shows elevated ; unclear if truly done while fasting      #CAD s/p CABG(2015)  #HTN/HLD/GERD  - c/w aspirin, losartan, statin    #BPH  - c/w tamsulosin    #Misc  -DVT prophylaxis: Lovenox  -GI prophylaxis: PPI  -Diet: DASH/CC  -Code status: Full  -Activity: AAT    Handoff:   - follow up FS and adjust insulin as needed  - Pt needs picc line for 3 weeks vancomycin and ceftriaxone for 3 weeks and then po bactrim, IR consulted   - warm soapy soaks TID for 15-20 minutes  - op f/u with ID and ortho  - monitor for BMs while on opioids  - f/u vanc trough on 7/24 am  - if Cr > 1.3 vanc trough sooner

## 2023-07-19 NOTE — CHART NOTE - NSCHARTNOTEFT_GEN_A_CORE
Spoke with son and daughter. They would like to continue with IV abx and have the PICC inserted. They expressed concerns of taking care of MrChristopher Joseph at home and requested to look into if NH placement is possible.

## 2023-07-19 NOTE — PROGRESS NOTE ADULT - SUBJECTIVE AND OBJECTIVE BOX
JAVI JUDD  68y, Male  Allergy: No Known Allergies      LOS  5d    CHIEF COMPLAINT: R IF PARONYCHIA (16 Jul 2023 16:39)      INTERVAL EVENTS/HPI  - No acute events overnight  - T(F): , Max: 98 (07-19-23 @ 05:17)  - Denies any worsening symptoms  - Tolerating medication  - WBC Count: 6.16 (07-19-23 @ 07:16)  WBC Count: 5.63 (07-18-23 @ 08:17)     - Creatinine: 1.0 (07-19-23 @ 07:16)  Creatinine: 1.0 (07-18-23 @ 08:17)       ROS  General: Denies rigors, nightsweats  HEENT: Denies headache, rhinorrhea, sore throat, eye pain  CV: Denies CP, palpitations  PULM: Denies wheezing, hemoptysis  GI: Denies hematemesis, hematochezia, melena  : Denies discharge, hematuria  MSK: Denies arthralgias, myalgias  SKIN: Denies rash, lesions  NEURO: Denies paresthesias, weakness  PSYCH: Denies depression, anxiety    VITALS:  T(F): 98, Max: 98 (07-19-23 @ 05:17)  HR: 72  BP: 150/72  RR: 18Vital Signs Last 24 Hrs  T(C): 36.7 (19 Jul 2023 14:02), Max: 36.7 (19 Jul 2023 05:17)  T(F): 98 (19 Jul 2023 14:02), Max: 98 (19 Jul 2023 05:17)  HR: 72 (19 Jul 2023 14:02) (72 - 91)  BP: 150/72 (19 Jul 2023 14:02) (126/80 - 150/72)  BP(mean): --  RR: 18 (19 Jul 2023 05:17) (18 - 18)  SpO2: 99% (19 Jul 2023 14:02) (99% - 99%)    Parameters below as of 19 Jul 2023 14:02  Patient On (Oxygen Delivery Method): room air        PHYSICAL EXAM:  Gen: NAD, resting in bed  HEENT: Normocephalic, atraumatic  Neck: supple, no lymphadenopathy  CV: Regular rate & regular rhythm  Lungs: decreased BS at bases, no fremitus  Abdomen: Soft, BS present  Ext: Warm, well perfused dressings  Neuro: non focal, awake  Skin: no rash, no erythema  Lines: no phlebitis    FH: Non-contributory  Social Hx: Non-contributory    TESTS & MEASUREMENTS:                        14.6   6.16  )-----------( 174      ( 19 Jul 2023 07:16 )             44.6     07-19    131<L>  |  99  |  15  ----------------------------<  205<H>  4.4   |  23  |  1.0    Ca    9.6      19 Jul 2023 07:16  Phos  3.1     07-19  Mg     1.9     07-19    TPro  6.8  /  Alb  4.1  /  TBili  0.3  /  DBili  x   /  AST  38  /  ALT  53<H>  /  AlkPhos  85  07-19      LIVER FUNCTIONS - ( 19 Jul 2023 07:16 )  Alb: 4.1 g/dL / Pro: 6.8 g/dL / ALK PHOS: 85 U/L / ALT: 53 U/L / AST: 38 U/L / GGT: x           Urinalysis Basic - ( 19 Jul 2023 07:16 )    Color: x / Appearance: x / SG: x / pH: x  Gluc: 205 mg/dL / Ketone: x  / Bili: x / Urobili: x   Blood: x / Protein: x / Nitrite: x   Leuk Esterase: x / RBC: x / WBC x   Sq Epi: x / Non Sq Epi: x / Bacteria: x        Culture - Blood (collected 07-16-23 @ 14:07)  Source: .Blood Blood-Peripheral  Preliminary Report (07-18-23 @ 23:02):    No growth at 48 Hours            INFECTIOUS DISEASES TESTING  Vancomycin Level, Trough: 12.1 (07-19-23 @ 07:16)  MRSA PCR Result.: Negative (07-18-23 @ 11:05)  Vancomycin Level, Trough: 11.5 (07-17-23 @ 20:34)      INFLAMMATORY MARKERS  C-Reactive Protein, Serum: 11.2 mg/L (07-18-23 @ 08:17)  Sedimentation Rate, Erythrocyte: 26 mm/Hr (07-18-23 @ 08:17)  Sedimentation Rate, Erythrocyte: 37 mm/Hr (07-15-23 @ 07:30)  C-Reactive Protein, Serum: 18.6 mg/L (07-15-23 @ 07:30)      RADIOLOGY & ADDITIONAL TESTS:  I have personally reviewed the last available Chest xray  CXR      CT      CARDIOLOGY TESTING  12 Lead ECG:   Ventricular Rate 84 BPM    Atrial Rate 84 BPM    P-R Interval 198 ms    QRS Duration 86 ms    Q-T Interval 338 ms    QTC Calculation(Bazett) 399 ms    P Axis 66 degrees    R Axis 5 degrees    T Axis 24 degrees    Diagnosis Line Normal sinus rhythm with sinus arrhythmia  Inferior infarct , age undetermined  Abnormal ECG    Confirmed by Stepheine Velasquez MD ([id number]) on 7/15/2023 6:26:48 PM (07-15-23 @ 09:28)      MEDICATIONS  aspirin  chewable 81 Oral daily  atorvastatin 40 Oral at bedtime  cefTRIAXone   IVPB     cefTRIAXone   IVPB 2000 IV Intermittent every 24 hours  dextrose 5%. 1000 IV Continuous <Continuous>  dextrose 5%. 1000 IV Continuous <Continuous>  dextrose 50% Injectable 25 IV Push once  dextrose 50% Injectable 25 IV Push once  dextrose 50% Injectable 12.5 IV Push once  enoxaparin Injectable 40 SubCutaneous every 24 hours  glucagon  Injectable 1 IntraMuscular once  insulin glargine Injectable (LANTUS) 28 SubCutaneous at bedtime  insulin lispro (ADMELOG) corrective regimen sliding scale  SubCutaneous three times a day before meals  insulin lispro Injectable (ADMELOG) 6 SubCutaneous three times a day before meals  losartan 25 Oral daily  magnesium sulfate  IVPB 1 IV Intermittent once  pantoprazole    Tablet 40 Oral before breakfast  polyethylene glycol 3350 17 Oral daily  senna 2 Oral at bedtime  tamsulosin 0.4 Oral at bedtime  vancomycin  IVPB 1000 IV Intermittent every 12 hours      WEIGHT  Weight (kg): 86.2 (07-14-23 @ 19:24)  Creatinine: 1.0 mg/dL (07-19-23 @ 07:16)      ANTIBIOTICS:  cefTRIAXone   IVPB      cefTRIAXone   IVPB 2000 milliGRAM(s) IV Intermittent every 24 hours  vancomycin  IVPB 1000 milliGRAM(s) IV Intermittent every 12 hours      All available historical records have been reviewed

## 2023-07-19 NOTE — PROGRESS NOTE ADULT - SUBJECTIVE AND OBJECTIVE BOX
JAVI JUDD 68y Male  MRN#: [id number]     Hospital Day: 5d    Pt is currently admitted with the primary diagnosis of     Overnight events   -No major overnight events  -co constipation but BM this AM so would not like regimen                                           ----------------------------------------------------------  OBJECTIVE  PAST MEDICAL & SURGICAL HISTORY  ASHD (arteriosclerotic heart disease)    Hypertension, unspecified type    Hyperlipidemia, unspecified hyperlipidemia type    Type 2 diabetes mellitus with complication, unspecified long term insulin use status    History of open heart surgery  CABG x 4, 5 years ago                                              -----------------------------------------------------------  MEDICATIONS:  STANDING MEDICATIONS  aspirin  chewable 81 milliGRAM(s) Oral daily  atorvastatin 40 milliGRAM(s) Oral at bedtime  cefTRIAXone   IVPB      cefTRIAXone   IVPB 2000 milliGRAM(s) IV Intermittent every 24 hours  dextrose 5%. 1000 milliLiter(s) IV Continuous <Continuous>  dextrose 5%. 1000 milliLiter(s) IV Continuous <Continuous>  dextrose 50% Injectable 25 Gram(s) IV Push once  dextrose 50% Injectable 12.5 Gram(s) IV Push once  dextrose 50% Injectable 25 Gram(s) IV Push once  enoxaparin Injectable 40 milliGRAM(s) SubCutaneous every 24 hours  glucagon  Injectable 1 milliGRAM(s) IntraMuscular once  insulin glargine Injectable (LANTUS) 28 Unit(s) SubCutaneous at bedtime  insulin lispro (ADMELOG) corrective regimen sliding scale   SubCutaneous three times a day before meals  insulin lispro Injectable (ADMELOG) 6 Unit(s) SubCutaneous three times a day before meals  losartan 25 milliGRAM(s) Oral daily  magnesium sulfate  IVPB 1 Gram(s) IV Intermittent once  pantoprazole    Tablet 40 milliGRAM(s) Oral before breakfast  polyethylene glycol 3350 17 Gram(s) Oral daily  senna 2 Tablet(s) Oral at bedtime  tamsulosin 0.4 milliGRAM(s) Oral at bedtime  vancomycin  IVPB 1000 milliGRAM(s) IV Intermittent every 12 hours    PRN MEDICATIONS  acetaminophen     Tablet .. 650 milliGRAM(s) Oral every 6 hours PRN  dextrose Oral Gel 15 Gram(s) Oral once PRN  melatonin 3 milliGRAM(s) Oral at bedtime PRN  oxycodone    5 mG/acetaminophen 325 mG 1 Tablet(s) Oral every 6 hours PRN  traMADol 25 milliGRAM(s) Oral every 4 hours PRN                                            ------------------------------------------------------------  VITAL SIGNS: Last 24 Hours  T(C): 36.7 (19 Jul 2023 14:02), Max: 36.7 (19 Jul 2023 05:17)  T(F): 98 (19 Jul 2023 14:02), Max: 98 (19 Jul 2023 05:17)  HR: 72 (19 Jul 2023 14:02) (72 - 91)  BP: 150/72 (19 Jul 2023 14:02) (126/80 - 150/72)  BP(mean): --  RR: 18 (19 Jul 2023 05:17) (18 - 18)  SpO2: 99% (19 Jul 2023 14:02) (99% - 99%)                                             --------------------------------------------------------------  LABS:                        14.6   6.16  )-----------( 174      ( 19 Jul 2023 07:16 )             44.6     07-19    131<L>  |  99  |  15  ----------------------------<  205<H>  4.4   |  23  |  1.0    Ca    9.6      19 Jul 2023 07:16  Phos  3.1     07-19  Mg     1.9     07-19    TPro  6.8  /  Alb  4.1  /  TBili  0.3  /  DBili  x   /  AST  38  /  ALT  53<H>  /  AlkPhos  85  07-19      Urinalysis Basic - ( 19 Jul 2023 07:16 )    Color: x / Appearance: x / SG: x / pH: x  Gluc: 205 mg/dL / Ketone: x  / Bili: x / Urobili: x   Blood: x / Protein: x / Nitrite: x   Leuk Esterase: x / RBC: x / WBC x   Sq Epi: x / Non Sq Epi: x / Bacteria: x                                                            --------------------------------------------------------------  PHYSICAL EXAM:  GENERAL: Awake, alert, oriented to person, place, time, situation. Well-nourished, laying in bed appearing in no acute distress  LUNGS: Clear to auscultation bilaterally  HEART: S1/S2.   ABD: Soft, non-tender, non-distended.  SKIN: No edema    Language preferred French    PLAN:    # DVT prophylaxis     # GI prophylaxis     # Diet     # Activity Score (AM-PAC)    #Progress Note Handoff  Pending (specify):  Family discussion:   Disposition:

## 2023-07-19 NOTE — PROGRESS NOTE ADULT - SUBJECTIVE AND OBJECTIVE BOX
Plastic Surgery    SUBJECTIVE: Pt seen and examined on rounds with team. No acute events overnight.        OBJECTIVE    PHYSICAL EXAM:   General: NAD, Lying in bed   Neuro: Awake and alert  Ext: right IF with small area of skin breakdown over dorsum of finger, localized erythema and tenderness. No fluctuance    VITALS  T(C): 36.7 (07-19-23 @ 05:17), Max: 36.7 (07-19-23 @ 05:17)  HR: 90 (07-19-23 @ 05:17) (68 - 91)  BP: 126/80 (07-19-23 @ 05:17) (126/80 - 142/73)  RR: 18 (07-19-23 @ 05:17) (16 - 18)  SpO2: 99% (07-19-23 @ 05:17) (98% - 99%)  CAPILLARY BLOOD GLUCOSE      POCT Blood Glucose.: 196 mg/dL (19 Jul 2023 07:32)  POCT Blood Glucose.: 151 mg/dL (18 Jul 2023 21:16)  POCT Blood Glucose.: 148 mg/dL (18 Jul 2023 17:05)  POCT Blood Glucose.: 217 mg/dL (18 Jul 2023 12:05)      Is/Os      MEDICATIONS (STANDING): aspirin  chewable 81 milliGRAM(s) Oral daily  atorvastatin 40 milliGRAM(s) Oral at bedtime  cefTRIAXone   IVPB      cefTRIAXone   IVPB 2000 milliGRAM(s) IV Intermittent every 24 hours  dextrose 5%. 1000 milliLiter(s) IV Continuous <Continuous>  dextrose 5%. 1000 milliLiter(s) IV Continuous <Continuous>  dextrose 50% Injectable 25 Gram(s) IV Push once  dextrose 50% Injectable 25 Gram(s) IV Push once  dextrose 50% Injectable 12.5 Gram(s) IV Push once  enoxaparin Injectable 40 milliGRAM(s) SubCutaneous every 24 hours  glucagon  Injectable 1 milliGRAM(s) IntraMuscular once  insulin glargine Injectable (LANTUS) 28 Unit(s) SubCutaneous at bedtime  insulin lispro (ADMELOG) corrective regimen sliding scale   SubCutaneous three times a day before meals  insulin lispro Injectable (ADMELOG) 6 Unit(s) SubCutaneous three times a day before meals  losartan 25 milliGRAM(s) Oral daily  magnesium sulfate  IVPB 1 Gram(s) IV Intermittent once  pantoprazole    Tablet 40 milliGRAM(s) Oral before breakfast  polyethylene glycol 3350 17 Gram(s) Oral daily  senna 2 Tablet(s) Oral at bedtime  tamsulosin 0.4 milliGRAM(s) Oral at bedtime  vancomycin  IVPB 1000 milliGRAM(s) IV Intermittent every 12 hours    MEDICATIONS (PRN):acetaminophen     Tablet .. 650 milliGRAM(s) Oral every 6 hours PRN Temp greater or equal to 38C (100.4F), Mild Pain (1 - 3)  dextrose Oral Gel 15 Gram(s) Oral once PRN Blood Glucose LESS THAN 70 milliGRAM(s)/deciliter  melatonin 3 milliGRAM(s) Oral at bedtime PRN Insomnia  oxycodone    5 mG/acetaminophen 325 mG 1 Tablet(s) Oral every 6 hours PRN Severe Pain (7 - 10)  traMADol 25 milliGRAM(s) Oral every 4 hours PRN Moderate Pain (4 - 6)      LABS  CBC (07-19 @ 07:16)                              14.6                           6.16    )----------------(  174        55.5  % Neutrophils, 32.3  % Lymphocytes, ANC: 3.42                                44.6    CBC (07-18 @ 08:17)                              14.7                           5.63    )----------------(  176        49.9  % Neutrophils, 35.7  % Lymphocytes, ANC: 2.81                                43.9      BMP (07-19 @ 07:16)             131<L>  |  99      |  15    		Ca++ --      Ca 9.6                ---------------------------------( 205<H>		Mg 1.9                4.4     |  23      |  1.0   			Ph 3.1     BMP (07-18 @ 08:17)             135     |  100     |  15    		Ca++ --      Ca 9.6                ---------------------------------( 238<H>		Mg 1.8                4.4     |  24      |  1.0   			Ph 3.0       LFTs (07-19 @ 07:16)      TPro 6.8 / Alb 4.1 / TBili 0.3 / DBili -- / AST 38 / ALT 53<H> / AlkPhos 85  LFTs (07-18 @ 08:17)      TPro 7.0 / Alb 4.1 / TBili 0.3 / DBili -- / AST 27 / ALT 38 / AlkPhos 85              IMAGING STUDIES

## 2023-07-19 NOTE — CHART NOTE - NSCHARTNOTEFT_GEN_A_CORE
The daughter requested a call back. She stated that she spoke with their insurance and the nursing home won't be covered and that home nursing service were covered which is their preference. That is how they would like to proceed. Additionally, risks and benefits of the PICC insertion were discussed and they would like to proceed with the insertion. The daughter requested a call back. She stated that she spoke with their insurance and the nursing home won't be covered and that home nursing service were covered which is their preference. That is how they would like to proceed. - Will need to clarify what's covered with CM and pt's preferences.     Additionally, risks and benefits of the PICC insertion were discussed with the patient and family and they would like to proceed with the insertion.

## 2023-07-19 NOTE — PROGRESS NOTE ADULT - SUBJECTIVE AND OBJECTIVE BOX
JAVI UJDD  [facility] 024 B ([facility]      Patient was evaluated and examined  by bedside, no active complains         REVIEW OF SYSTEMS: please see pertinent positives mentioned above, all other 12 ROS negative      T(C): , Max: 36.7 (07-19-23 @ 05:17)  HR: 72 (07-19-23 @ 14:02)  BP: 150/72 (07-19-23 @ 14:02)  RR: 18 (07-19-23 @ 05:17)  SpO2: 99% (07-19-23 @ 14:02)  CAPILLARY BLOOD GLUCOSE      POCT Blood Glucose.: 217 mg/dL (19 Jul 2023 11:29)  POCT Blood Glucose.: 196 mg/dL (19 Jul 2023 07:32)  POCT Blood Glucose.: 151 mg/dL (18 Jul 2023 21:16)  POCT Blood Glucose.: 148 mg/dL (18 Jul 2023 17:05)      PHYSICAL EXAM:  General: NAD, AAOX3, patient is laying comfortably in bed  HEENT: AT, NC, Supple, NO JVD, NO CB  Lungs: CTA B/L, no wheezing, no rhonchi  CVS: normal S1, S2, RRR, NO M/G/R  Abdomen: soft, bowel sounds present, non-tender, non-distended  Extremities: no edema, no clubbing, no cyanosis, positive peripheral pulses b/l  Neuro: no acute focal neurological deficits  Skin: no rush, no ecchymosis, right index finger covered with dressing       LAB  CBC  Date: 07-19-23 @ 07:16  Mean cell Hvdqvtiqrx41.6  Mean cell Hemoglobin Conc32.7  Mean cell Volum 87.5  Platelet count-Automate 174  RBC Count 5.10  Red Cell Distrib Width13.9  WBC Count6.16  % Albumin, Urine--  Hematocrit 44.6  Hemoglobin 14.6  CBC  Date: 07-18-23 @ 08:17  Mean cell Bbqltnjwhv39.4  Mean cell Hemoglobin Conc33.5  Mean cell Volum 87.8  Platelet count-Automate 176  RBC Count 5.00  Red Cell Distrib Width14.0  WBC Count5.63  % Albumin, Urine--  Hematocrit 43.9  Hemoglobin 14.7        BMP  07-19-23 @ 07:16  Blood Gas Arterial-Calcium,Ionized--  Blood Urea Nitrogen, Serum 15 mg/dL [10 - 20]  Carbon Dioxide, Serum23 mmol/L [17 - 32]  Chloride, Serum99 mmol/L [98 - 110]  Creatinie, Serum1.0 mg/dL [0.7 - 1.5]  Glucose, Tasgb553 mg/dL<H> [70 - 99]  Potassium, Serum4.4 mmol/L [3.5 - 5.0]  Sodium, Serum 131 mmol/L<L> [135 - 146]  BMP  07-18-23 @ 08:17  Blood Gas Arterial-Calcium,Ionized--  Blood Urea Nitrogen, Serum 15 mg/dL [10 - 20]  Carbon Dioxide, Serum24 mmol/L [17 - 32]  Chloride, Xhmoa144 mmol/L [98 - 110]  Creatinie, Serum1.0 mg/dL [0.7 - 1.5]  Glucose, Qxmyp226 mg/dL<H> [70 - 99]  Potassium, Serum4.4 mmol/L [3.5 - 5.0]  Sodium, Serum 135 mmol/L [135 - 146]          Microbiology:    Culture - Blood (collected 07-16-23 @ 14:07)  Source: .Blood Blood-Peripheral  Preliminary Report (07-18-23 @ 23:02):    No growth at 48 Hours        Medications:  acetaminophen     Tablet .. 650 milliGRAM(s) Oral every 6 hours PRN  aspirin  chewable 81 milliGRAM(s) Oral daily  atorvastatin 40 milliGRAM(s) Oral at bedtime  cefTRIAXone   IVPB      cefTRIAXone   IVPB 2000 milliGRAM(s) IV Intermittent every 24 hours  dextrose 5%. 1000 milliLiter(s) IV Continuous <Continuous>  dextrose 5%. 1000 milliLiter(s) IV Continuous <Continuous>  dextrose 50% Injectable 25 Gram(s) IV Push once  dextrose 50% Injectable 12.5 Gram(s) IV Push once  dextrose 50% Injectable 25 Gram(s) IV Push once  dextrose Oral Gel 15 Gram(s) Oral once PRN  enoxaparin Injectable 40 milliGRAM(s) SubCutaneous every 24 hours  glucagon  Injectable 1 milliGRAM(s) IntraMuscular once  insulin glargine Injectable (LANTUS) 28 Unit(s) SubCutaneous at bedtime  insulin lispro (ADMELOG) corrective regimen sliding scale   SubCutaneous three times a day before meals  insulin lispro Injectable (ADMELOG) 6 Unit(s) SubCutaneous three times a day before meals  losartan 25 milliGRAM(s) Oral daily  magnesium sulfate  IVPB 1 Gram(s) IV Intermittent once  melatonin 3 milliGRAM(s) Oral at bedtime PRN  oxycodone    5 mG/acetaminophen 325 mG 1 Tablet(s) Oral every 6 hours PRN  pantoprazole    Tablet 40 milliGRAM(s) Oral before breakfast  polyethylene glycol 3350 17 Gram(s) Oral daily  senna 2 Tablet(s) Oral at bedtime  tamsulosin 0.4 milliGRAM(s) Oral at bedtime  traMADol 25 milliGRAM(s) Oral every 4 hours PRN  vancomycin  IVPB 1000 milliGRAM(s) IV Intermittent every 12 hours        Assessment and Plan:  Patient is a 69 y/o Male with a pmhx of DMII, CAD s/p CABG(2015), HTN, HLD, SHx of B/L shoulder and Lt. knee replacement presents with RT 1st digit swelling s/p I&D of paronychia on 7/12    #RT 1st digit swelling with concern for skin and soft tissue infection  - pt previously presented with a paronychia on the same finger s/p I&D on 7/12  - failed Augmentin 3 days op  - x-ray showed no fractures/dislocation  - CT hand: Cortical erosive changes and periosteal reaction at the distal aspect of the middle phalanx of the second digit. Associated soft tissue swelling. This may reflect early osteomyelitis versus erosive degenerative change.  - MRI hand was indeterminate for OM; demonstrated soft tissue infection  - Ortho declined a bone biopsy  - ESR/CRP 40/24  - ID recommended that picc line be placed with vancomycin and ceftriaxone for 3 weeks and then po bactrim  - continue on Vancomycin and ceftriaxone   - trend esr/crp weekly  - if patient will decline to be d/c on IV abx., than d/c on PO Bactrim with close BMP, K level monitoring  - outpatient ID f/up and Hand Sx. f/up       #DM II  - Lantus 28 units and lispro 6 units tid  - Hba1c is 8.2        #CAD s/p CABG(2015)  #HTN/HLD/GERD  - c/w aspirin, losartan, statin    #BPH- c/w tamsulosin    #Progress Note Handoff: Pending d/c home , depending if family will agree for patient to be d/c on IV abx vs. PO abx. , if IV than will need Picc line  Family discussion: d/c plan was d/w pt. by bedside Disposition: Home__possibly today     Total time spent to complete patient's bedside assessment, review medical chart, discuss medical plan of care with covering medical team was more than 35 minutes with >50% of time spent face to face with patient, discussion with patient/family and/or coordination of care

## 2023-07-20 ENCOUNTER — TRANSCRIPTION ENCOUNTER (OUTPATIENT)
Age: 68
End: 2023-07-20

## 2023-07-20 LAB
ALBUMIN SERPL ELPH-MCNC: 3.9 G/DL — SIGNIFICANT CHANGE UP (ref 3.5–5.2)
ALP SERPL-CCNC: 87 U/L — SIGNIFICANT CHANGE UP (ref 30–115)
ALT FLD-CCNC: 47 U/L — HIGH (ref 0–41)
ANION GAP SERPL CALC-SCNC: 11 MMOL/L — SIGNIFICANT CHANGE UP (ref 7–14)
APTT BLD: 33.5 SEC — SIGNIFICANT CHANGE UP (ref 27–39.2)
AST SERPL-CCNC: 23 U/L — SIGNIFICANT CHANGE UP (ref 0–41)
BILIRUB SERPL-MCNC: 0.3 MG/DL — SIGNIFICANT CHANGE UP (ref 0.2–1.2)
BUN SERPL-MCNC: 14 MG/DL — SIGNIFICANT CHANGE UP (ref 10–20)
CALCIUM SERPL-MCNC: 9.5 MG/DL — SIGNIFICANT CHANGE UP (ref 8.4–10.5)
CHLORIDE SERPL-SCNC: 103 MMOL/L — SIGNIFICANT CHANGE UP (ref 98–110)
CO2 SERPL-SCNC: 23 MMOL/L — SIGNIFICANT CHANGE UP (ref 17–32)
CREAT SERPL-MCNC: 1 MG/DL — SIGNIFICANT CHANGE UP (ref 0.7–1.5)
EGFR: 82 ML/MIN/1.73M2 — SIGNIFICANT CHANGE UP
GLUCOSE BLDC GLUCOMTR-MCNC: 181 MG/DL — HIGH (ref 70–99)
GLUCOSE BLDC GLUCOMTR-MCNC: 203 MG/DL — HIGH (ref 70–99)
GLUCOSE BLDC GLUCOMTR-MCNC: 237 MG/DL — HIGH (ref 70–99)
GLUCOSE BLDC GLUCOMTR-MCNC: 239 MG/DL — HIGH (ref 70–99)
GLUCOSE SERPL-MCNC: 209 MG/DL — HIGH (ref 70–99)
HCT VFR BLD CALC: 43.4 % — SIGNIFICANT CHANGE UP (ref 42–52)
HGB BLD-MCNC: 14.6 G/DL — SIGNIFICANT CHANGE UP (ref 14–18)
INR BLD: 1.03 RATIO — SIGNIFICANT CHANGE UP (ref 0.65–1.3)
MCHC RBC-ENTMCNC: 29.3 PG — SIGNIFICANT CHANGE UP (ref 27–31)
MCHC RBC-ENTMCNC: 33.6 G/DL — SIGNIFICANT CHANGE UP (ref 32–37)
MCV RBC AUTO: 87 FL — SIGNIFICANT CHANGE UP (ref 80–94)
NRBC # BLD: 0 /100 WBCS — SIGNIFICANT CHANGE UP (ref 0–0)
PLATELET # BLD AUTO: 174 K/UL — SIGNIFICANT CHANGE UP (ref 130–400)
PMV BLD: 12 FL — HIGH (ref 7.4–10.4)
POTASSIUM SERPL-MCNC: 4.2 MMOL/L — SIGNIFICANT CHANGE UP (ref 3.5–5)
POTASSIUM SERPL-SCNC: 4.2 MMOL/L — SIGNIFICANT CHANGE UP (ref 3.5–5)
PROT SERPL-MCNC: 6.6 G/DL — SIGNIFICANT CHANGE UP (ref 6–8)
PROTHROM AB SERPL-ACNC: 11.7 SEC — SIGNIFICANT CHANGE UP (ref 9.95–12.87)
RBC # BLD: 4.99 M/UL — SIGNIFICANT CHANGE UP (ref 4.7–6.1)
RBC # FLD: 13.9 % — SIGNIFICANT CHANGE UP (ref 11.5–14.5)
SODIUM SERPL-SCNC: 137 MMOL/L — SIGNIFICANT CHANGE UP (ref 135–146)
WBC # BLD: 6.71 K/UL — SIGNIFICANT CHANGE UP (ref 4.8–10.8)
WBC # FLD AUTO: 6.71 K/UL — SIGNIFICANT CHANGE UP (ref 4.8–10.8)

## 2023-07-20 PROCEDURE — 36573 INSJ PICC RS&I 5 YR+: CPT

## 2023-07-20 PROCEDURE — 99232 SBSQ HOSP IP/OBS MODERATE 35: CPT

## 2023-07-20 PROCEDURE — 99233 SBSQ HOSP IP/OBS HIGH 50: CPT

## 2023-07-20 RX ORDER — METOCLOPRAMIDE HCL 10 MG
10 TABLET ORAL ONCE
Refills: 0 | Status: DISCONTINUED | OUTPATIENT
Start: 2023-07-20 | End: 2023-07-22

## 2023-07-20 RX ADMIN — Medication 6 UNIT(S): at 08:46

## 2023-07-20 RX ADMIN — Medication 81 MILLIGRAM(S): at 12:22

## 2023-07-20 RX ADMIN — Medication 1: at 08:46

## 2023-07-20 RX ADMIN — ATORVASTATIN CALCIUM 40 MILLIGRAM(S): 80 TABLET, FILM COATED ORAL at 22:11

## 2023-07-20 RX ADMIN — Medication 6 UNIT(S): at 12:23

## 2023-07-20 RX ADMIN — Medication 2: at 12:22

## 2023-07-20 RX ADMIN — Medication 250 MILLIGRAM(S): at 09:22

## 2023-07-20 RX ADMIN — Medication 6 UNIT(S): at 18:00

## 2023-07-20 RX ADMIN — INSULIN GLARGINE 28 UNIT(S): 100 INJECTION, SOLUTION SUBCUTANEOUS at 22:11

## 2023-07-20 RX ADMIN — POLYETHYLENE GLYCOL 3350 17 GRAM(S): 17 POWDER, FOR SOLUTION ORAL at 12:22

## 2023-07-20 RX ADMIN — SENNA PLUS 2 TABLET(S): 8.6 TABLET ORAL at 22:10

## 2023-07-20 RX ADMIN — LOSARTAN POTASSIUM 25 MILLIGRAM(S): 100 TABLET, FILM COATED ORAL at 05:43

## 2023-07-20 RX ADMIN — PANTOPRAZOLE SODIUM 40 MILLIGRAM(S): 20 TABLET, DELAYED RELEASE ORAL at 05:43

## 2023-07-20 RX ADMIN — ENOXAPARIN SODIUM 40 MILLIGRAM(S): 100 INJECTION SUBCUTANEOUS at 12:22

## 2023-07-20 RX ADMIN — TAMSULOSIN HYDROCHLORIDE 0.4 MILLIGRAM(S): 0.4 CAPSULE ORAL at 22:10

## 2023-07-20 RX ADMIN — CEFTRIAXONE 100 MILLIGRAM(S): 500 INJECTION, POWDER, FOR SOLUTION INTRAMUSCULAR; INTRAVENOUS at 18:26

## 2023-07-20 RX ADMIN — Medication 2: at 18:00

## 2023-07-20 NOTE — DISCHARGE NOTE NURSING/CASE MANAGEMENT/SOCIAL WORK - PATIENT PORTAL LINK FT
You can access the FollowMyHealth Patient Portal offered by [facility] by registering at the following website: [email or web address]. By joining [facility]’s FollowMyHealth portal, you will also be able to view your health information using other applications (apps) compatible with our system.

## 2023-07-20 NOTE — DISCHARGE NOTE PROVIDER - CARE PROVIDER_API CALL
Naa Pearce  Infectious Disease  [address] [place] [address]  Phone: [phone]  Fax: [phone]  Follow Up Time: 1 week    Ammon Castaneda  Surgery of the Hand  [address] [place] [id number]  Phone: [phone]  Fax: [phone]  Follow Up Time: 2 weeks   Naa Pearce  Infectious Disease  [address] [place] [address]  Phone: [phone]  Fax: [phone]  Follow Up Time: 1 week    Ammon Castaneda  Surgery of the Hand  [address] [place] [address]  Phone: [phone]  Fax: [phone]  Follow Up Time: 2 weeks    GRACE SOLIZ  [address] [place] [address]  Phone: [phone]  Fax: [phone]  Follow Up Time:

## 2023-07-20 NOTE — PROGRESS NOTE ADULT - SUBJECTIVE AND OBJECTIVE BOX
JAVI JUDD  68y, Male  Allergy: No Known Allergies      LOS  6d    CHIEF COMPLAINT: right index finger infection (19 Jul 2023 15:48)      INTERVAL EVENTS/HPI  - No acute events overnight  - T(F): , Max: 98 (07-19-23 @ 14:02)  - Tolerating medication  - WBC Count: 6.16 (07-19-23 @ 07:16)  WBC Count: 5.63 (07-18-23 @ 08:17)     - Creatinine: 1.0 (07-19-23 @ 07:16)  Creatinine: 1.0 (07-18-23 @ 08:17)       ROS  ***    VITALS:  T(F): 98, Max: 98 (07-19-23 @ 14:02)  HR: 79  BP: 131/63  RR: 18Vital Signs Last 24 Hrs  T(C): 36.7 (20 Jul 2023 05:34), Max: 36.7 (19 Jul 2023 14:02)  T(F): 98 (20 Jul 2023 05:34), Max: 98 (19 Jul 2023 14:02)  HR: 79 (20 Jul 2023 05:34) (72 - 79)  BP: 131/63 (20 Jul 2023 05:34) (131/63 - 150/72)  BP(mean): --  RR: 18 (20 Jul 2023 05:34) (18 - 18)  SpO2: 98% (20 Jul 2023 05:34) (98% - 99%)    Parameters below as of 19 Jul 2023 21:00  Patient On (Oxygen Delivery Method): room air        PHYSICAL EXAM:  ***    FH: Non-contributory  Social Hx: Non-contributory    TESTS & MEASUREMENTS:                        14.6   6.16  )-----------( 174      ( 19 Jul 2023 07:16 )             44.6     07-19    131<L>  |  99  |  15  ----------------------------<  205<H>  4.4   |  23  |  1.0    Ca    9.6      19 Jul 2023 07:16  Phos  3.1     07-19  Mg     1.9     07-19    TPro  6.8  /  Alb  4.1  /  TBili  0.3  /  DBili  x   /  AST  38  /  ALT  53<H>  /  AlkPhos  85  07-19      LIVER FUNCTIONS - ( 19 Jul 2023 07:16 )  Alb: 4.1 g/dL / Pro: 6.8 g/dL / ALK PHOS: 85 U/L / ALT: 53 U/L / AST: 38 U/L / GGT: x           Urinalysis Basic - ( 19 Jul 2023 07:16 )    Color: x / Appearance: x / SG: x / pH: x  Gluc: 205 mg/dL / Ketone: x  / Bili: x / Urobili: x   Blood: x / Protein: x / Nitrite: x   Leuk Esterase: x / RBC: x / WBC x   Sq Epi: x / Non Sq Epi: x / Bacteria: x        Culture - Blood (collected 07-16-23 @ 14:07)  Source: .Blood Blood-Peripheral  Preliminary Report (07-19-23 @ 23:01):    No growth at 72 Hours            INFECTIOUS DISEASES TESTING  Vancomycin Level, Trough: 12.1 (07-19-23 @ 07:16)  MRSA PCR Result.: Negative (07-18-23 @ 11:05)  Vancomycin Level, Trough: 11.5 (07-17-23 @ 20:34)  strept    INFLAMMATORY MARKERS  C-Reactive Protein, Serum: 11.2 mg/L (07-18-23 @ 08:17)  Sedimentation Rate, Erythrocyte: 26 mm/Hr (07-18-23 @ 08:17)  Sedimentation Rate, Erythrocyte: 37 mm/Hr (07-15-23 @ 07:30)  C-Reactive Protein, Serum: 18.6 mg/L (07-15-23 @ 07:30)      RADIOLOGY & ADDITIONAL TESTS:  I have personally reviewed the last available Chest xray  CXR      CT      CARDIOLOGY TESTING  12 Lead ECG:   Ventricular Rate 84 BPM    Atrial Rate 84 BPM    P-R Interval 198 ms    QRS Duration 86 ms    Q-T Interval 338 ms    QTC Calculation(Bazett) 399 ms    P Axis 66 degrees    R Axis 5 degrees    T Axis 24 degrees    Diagnosis Line Normal sinus rhythm with sinus arrhythmia  Inferior infarct , age undetermined  Abnormal ECG    Confirmed by Stephenie Velasquez MD ([id number]) on 7/15/2023 6:26:48 PM (07-15-23 @ 09:28)      MEDICATIONS  aspirin  chewable 81 Oral daily  atorvastatin 40 Oral at bedtime  cefTRIAXone   IVPB     cefTRIAXone   IVPB 2000 IV Intermittent every 24 hours  dextrose 5%. 1000 IV Continuous <Continuous>  dextrose 5%. 1000 IV Continuous <Continuous>  dextrose 50% Injectable 25 IV Push once  dextrose 50% Injectable 25 IV Push once  dextrose 50% Injectable 12.5 IV Push once  enoxaparin Injectable 40 SubCutaneous every 24 hours  glucagon  Injectable 1 IntraMuscular once  insulin glargine Injectable (LANTUS) 28 SubCutaneous at bedtime  insulin lispro (ADMELOG) corrective regimen sliding scale  SubCutaneous three times a day before meals  insulin lispro Injectable (ADMELOG) 6 SubCutaneous three times a day before meals  losartan 25 Oral daily  metoclopramide 10 Oral once  pantoprazole    Tablet 40 Oral before breakfast  polyethylene glycol 3350 17 Oral daily  senna 2 Oral at bedtime  tamsulosin 0.4 Oral at bedtime  vancomycin  IVPB 1000 IV Intermittent every 12 hours      WEIGHT  Weight (kg): 86.2 (07-14-23 @ 19:24)  Creatinine: 1.0 mg/dL (07-19-23 @ 07:16)      ANTIBIOTICS:  cefTRIAXone   IVPB      cefTRIAXone   IVPB 2000 milliGRAM(s) IV Intermittent every 24 hours  vancomycin  IVPB 1000 milliGRAM(s) IV Intermittent every 12 hours      All available historical records have been reviewed       JAVI JUDD  68y, Male  Allergy: No Known Allergies      LOS  6d    CHIEF COMPLAINT: right index finger infection (19 Jul 2023 15:48)      INTERVAL EVENTS/HPI  - No acute events overnight  - T(F): , Max: 98 (07-19-23 @ 14:02)  - Tolerating medication  - WBC Count: 6.16 (07-19-23 @ 07:16)  WBC Count: 5.63 (07-18-23 @ 08:17)  - Creatinine: 1.0 (07-19-23 @ 07:16)  Creatinine: 1.0 (07-18-23 @ 08:17)       ROS  General: Denies rigors, nightsweats  HEENT: Denies headache, rhinorrhea, sore throat, eye pain  CV: Denies CP, palpitations  PULM: Denies wheezing, hemoptysis  GI: Denies hematemesis, hematochezia, melena  : Denies discharge, hematuria  MSK: Denies arthralgias, myalgias  SKIN: Denies rash, lesions  NEURO: Denies paresthesias, weakness  PSYCH: Denies depression, anxiety     VITALS:  T(F): 98, Max: 98 (07-19-23 @ 14:02)  HR: 79  BP: 131/63  RR: 18Vital Signs Last 24 Hrs  T(C): 36.7 (20 Jul 2023 05:34), Max: 36.7 (19 Jul 2023 14:02)  T(F): 98 (20 Jul 2023 05:34), Max: 98 (19 Jul 2023 14:02)  HR: 79 (20 Jul 2023 05:34) (72 - 79)  BP: 131/63 (20 Jul 2023 05:34) (131/63 - 150/72)  BP(mean): --  RR: 18 (20 Jul 2023 05:34) (18 - 18)  SpO2: 98% (20 Jul 2023 05:34) (98% - 99%)    Parameters below as of 19 Jul 2023 21:00  Patient On (Oxygen Delivery Method): room air        PHYSICAL EXAM:  Gen: NAD, resting in bed  HEENT: Normocephalic, atraumatic  Neck: supple, no lymphadenopathy  CV: Regular rate & regular rhythm  Lungs: decreased BS at bases, no fremitus  Abdomen: Soft, BS present  Ext: Warm, well perfused  Neuro: non focal, awake  Skin: finger decreased edema  Lines: no phlebitis   FH: Non-contributory  Social Hx: Non-contributory    TESTS & MEASUREMENTS:                        14.6   6.16  )-----------( 174      ( 19 Jul 2023 07:16 )             44.6     07-19    131<L>  |  99  |  15  ----------------------------<  205<H>  4.4   |  23  |  1.0    Ca    9.6      19 Jul 2023 07:16  Phos  3.1     07-19  Mg     1.9     07-19    TPro  6.8  /  Alb  4.1  /  TBili  0.3  /  DBili  x   /  AST  38  /  ALT  53<H>  /  AlkPhos  85  07-19      LIVER FUNCTIONS - ( 19 Jul 2023 07:16 )  Alb: 4.1 g/dL / Pro: 6.8 g/dL / ALK PHOS: 85 U/L / ALT: 53 U/L / AST: 38 U/L / GGT: x           Urinalysis Basic - ( 19 Jul 2023 07:16 )    Color: x / Appearance: x / SG: x / pH: x  Gluc: 205 mg/dL / Ketone: x  / Bili: x / Urobili: x   Blood: x / Protein: x / Nitrite: x   Leuk Esterase: x / RBC: x / WBC x   Sq Epi: x / Non Sq Epi: x / Bacteria: x        Culture - Blood (collected 07-16-23 @ 14:07)  Source: .Blood Blood-Peripheral  Preliminary Report (07-19-23 @ 23:01):    No growth at 72 Hours            INFECTIOUS DISEASES TESTING  Vancomycin Level, Trough: 12.1 (07-19-23 @ 07:16)  MRSA PCR Result.: Negative (07-18-23 @ 11:05)  Vancomycin Level, Trough: 11.5 (07-17-23 @ 20:34)  strept    INFLAMMATORY MARKERS  C-Reactive Protein, Serum: 11.2 mg/L (07-18-23 @ 08:17)  Sedimentation Rate, Erythrocyte: 26 mm/Hr (07-18-23 @ 08:17)  Sedimentation Rate, Erythrocyte: 37 mm/Hr (07-15-23 @ 07:30)  C-Reactive Protein, Serum: 18.6 mg/L (07-15-23 @ 07:30)      RADIOLOGY & ADDITIONAL TESTS:  I have personally reviewed the last available Chest xray  CXR      CT      CARDIOLOGY TESTING  12 Lead ECG:   Ventricular Rate 84 BPM    Atrial Rate 84 BPM    P-R Interval 198 ms    QRS Duration 86 ms    Q-T Interval 338 ms    QTC Calculation(Bazett) 399 ms    P Axis 66 degrees    R Axis 5 degrees    T Axis 24 degrees    Diagnosis Line Normal sinus rhythm with sinus arrhythmia  Inferior infarct , age undetermined  Abnormal ECG    Confirmed by Stephenie Velasquez MD ([id number]) on 7/15/2023 6:26:48 PM (07-15-23 @ 09:28)      MEDICATIONS  aspirin  chewable 81 Oral daily  atorvastatin 40 Oral at bedtime  cefTRIAXone   IVPB     cefTRIAXone   IVPB 2000 IV Intermittent every 24 hours  dextrose 5%. 1000 IV Continuous <Continuous>  dextrose 5%. 1000 IV Continuous <Continuous>  dextrose 50% Injectable 25 IV Push once  dextrose 50% Injectable 25 IV Push once  dextrose 50% Injectable 12.5 IV Push once  enoxaparin Injectable 40 SubCutaneous every 24 hours  glucagon  Injectable 1 IntraMuscular once  insulin glargine Injectable (LANTUS) 28 SubCutaneous at bedtime  insulin lispro (ADMELOG) corrective regimen sliding scale  SubCutaneous three times a day before meals  insulin lispro Injectable (ADMELOG) 6 SubCutaneous three times a day before meals  losartan 25 Oral daily  metoclopramide 10 Oral once  pantoprazole    Tablet 40 Oral before breakfast  polyethylene glycol 3350 17 Oral daily  senna 2 Oral at bedtime  tamsulosin 0.4 Oral at bedtime  vancomycin  IVPB 1000 IV Intermittent every 12 hours      WEIGHT  Weight (kg): 86.2 (07-14-23 @ 19:24)  Creatinine: 1.0 mg/dL (07-19-23 @ 07:16)      ANTIBIOTICS:  cefTRIAXone   IVPB      cefTRIAXone   IVPB 2000 milliGRAM(s) IV Intermittent every 24 hours  vancomycin  IVPB 1000 milliGRAM(s) IV Intermittent every 12 hours      All available historical records have been reviewed

## 2023-07-20 NOTE — PROGRESS NOTE ADULT - TIME BILLING
I have personally seen and examined this patient.  I have reviewed all pertinent clinical information and reviewed all relevant imaging and diagnostic studies personally.   If possible, I counseled the patient about diagnostic testing and treatment plan.   I discussed my recommendations with the primary team.
I have personally seen and examined this patient.  I have reviewed all pertinent clinical information and reviewed all relevant imaging and diagnostic studies personally.   If possible, I counseled the patient about diagnostic testing and treatment plan.   I discussed my recommendations with the primary team.
direct pt care
I have personally seen and examined this patient.  I have reviewed all pertinent clinical information and reviewed all relevant imaging and diagnostic studies personally.   If possible, I counseled the patient about diagnostic testing and treatment plan.   I discussed my recommendations with the primary team.
I have personally seen and examined this patient.  I have reviewed all pertinent clinical information and reviewed all relevant imaging and diagnostic studies personally.   If possible, I counseled the patient about diagnostic testing and treatment plan.   I discussed my recommendations with the primary team.
direct pt care
direct pt care

## 2023-07-20 NOTE — DISCHARGE NOTE PROVIDER - PROVIDER TOKENS
PROVIDER:[TOKEN:[[id number]],FOLLOWUP:[1 week]],PROVIDER:[TOKEN:[[id number]],FOLLOWUP:[2 weeks]] PROVIDER:[TOKEN:[[id number]],FOLLOWUP:[1 week]],PROVIDER:[TOKEN:[[id number]],FOLLOWUP:[2 weeks]],PROVIDER:[TOKEN:[[id number]]]

## 2023-07-20 NOTE — DISCHARGE NOTE PROVIDER - NSDCHHNEEDSERVICE_GEN_ALL_CORE
Medication teaching and assessment/Observation and assessment/Wound care and assessment Medication teaching and assessment/Observation and assessment/Wound care and assessment/Other, specify...

## 2023-07-20 NOTE — PROGRESS NOTE ADULT - ASSESSMENT
67 yo M with a pmhx of DMII, CAD s/p CABG(2015), HTN, HLD, SHx of B/L shoulder and Lt. knee replacement presents with RT 2nd digit swelling admitted for suspected cellulitis.     #RT 2nd finger swelling # cellulitis  - pt previously presented with a paronychia on the same finger s/p I&D on 7/12  - failed Augmentin 3 days op  - x-ray showed no fractures/dislocation  - CT hand: Cortical erosive changes and periosteal reaction at the distal aspect of the middle phalanx of the second digit. Associated soft tissue swelling. This may reflect early osteomyelitis versus erosive degenerative change.  - MRI hand was indeterminate for OM; demonstrated soft tissue infection  - Ortho declined a bone biopsy  - wound culture not sent (?)  - 7/20 additional pus drainage plastic surgery evaluated and was too little to send as WCx  - ESR/CRP 40/24  - ID and Ortho recs appreciated  - s/p PICC insertion  - plan for  vancomycin and ceftriaxone for 3 weeks and then po bactrim 3 weeks  - continue on Vancomycin and ceftriaxone   - tylenol prn fever  - percocet prn pain 7-10  - tramadol prn pain 4-6 for breakthrough  - ppi   - miralax for bowel regimen  - trend esr/crp weekly  - f/u vanc trough on 7/24 am  - if Cr > 1.3 vanc trough sooner  - warm soapy soaks TID for 15-20 minutes  -MRSA nares negative     #DM II  - Lantus 28 units and lispro 6 units tid  - ISS  - Hba1c is 8.2  - lipid profile shows elevated ; unclear if truly done while fasting    #CAD s/p CABG(2015)  #HTN/HLD/GERD  - c/w aspirin, losartan, statin    #BPH  - c/w tamsulosin    #Misc  -DVT prophylaxis: Lovenox  -GI prophylaxis: PPI  -Diet: DASH/CC  -Code status: Full  -Activity: AAT    Handoff:   - follow up FS and adjust insulin as needed  - 3 weeks vancomycin and ceftriaxone for 3 weeks and then po bactrim, IR consulted   - warm soapy soaks TID for 15-20 minutes  - op f/u with ID and ortho  - monitor for BMs while on opioids  - f/u vanc trough on 7/24 am  - if Cr > 1.3 vanc trough sooner

## 2023-07-20 NOTE — PROGRESS NOTE ADULT - SUBJECTIVE AND OBJECTIVE BOX
JAVI JUDD 68y Male  MRN#: [id number]     Hospital Day: 6d    Pt is currently admitted with the primary diagnosis of     Overnight events   -No major overnight events                                          ----------------------------------------------------------  OBJECTIVE  PAST MEDICAL & SURGICAL HISTORY  ASHD (arteriosclerotic heart disease)    Hypertension, unspecified type    Hyperlipidemia, unspecified hyperlipidemia type    Type 2 diabetes mellitus with complication, unspecified long term insulin use status    History of open heart surgery  CABG x 4, 5 years ago                                              -----------------------------------------------------------  MEDICATIONS:  STANDING MEDICATIONS  aspirin  chewable 81 milliGRAM(s) Oral daily  atorvastatin 40 milliGRAM(s) Oral at bedtime  cefTRIAXone   IVPB      cefTRIAXone   IVPB 2000 milliGRAM(s) IV Intermittent every 24 hours  dextrose 5%. 1000 milliLiter(s) IV Continuous <Continuous>  dextrose 5%. 1000 milliLiter(s) IV Continuous <Continuous>  dextrose 50% Injectable 25 Gram(s) IV Push once  dextrose 50% Injectable 12.5 Gram(s) IV Push once  dextrose 50% Injectable 25 Gram(s) IV Push once  enoxaparin Injectable 40 milliGRAM(s) SubCutaneous every 24 hours  glucagon  Injectable 1 milliGRAM(s) IntraMuscular once  insulin glargine Injectable (LANTUS) 28 Unit(s) SubCutaneous at bedtime  insulin lispro (ADMELOG) corrective regimen sliding scale   SubCutaneous three times a day before meals  insulin lispro Injectable (ADMELOG) 6 Unit(s) SubCutaneous three times a day before meals  losartan 25 milliGRAM(s) Oral daily  metoclopramide 10 milliGRAM(s) Oral once  pantoprazole    Tablet 40 milliGRAM(s) Oral before breakfast  polyethylene glycol 3350 17 Gram(s) Oral daily  senna 2 Tablet(s) Oral at bedtime  tamsulosin 0.4 milliGRAM(s) Oral at bedtime  vancomycin  IVPB 1000 milliGRAM(s) IV Intermittent every 12 hours    PRN MEDICATIONS  acetaminophen     Tablet .. 650 milliGRAM(s) Oral every 6 hours PRN  dextrose Oral Gel 15 Gram(s) Oral once PRN  melatonin 3 milliGRAM(s) Oral at bedtime PRN  oxycodone    5 mG/acetaminophen 325 mG 1 Tablet(s) Oral every 6 hours PRN  traMADol 25 milliGRAM(s) Oral every 4 hours PRN                                            ------------------------------------------------------------  VITAL SIGNS: Last 24 Hours  T(C): 36.7 (20 Jul 2023 05:34), Max: 36.7 (20 Jul 2023 05:34)  T(F): 98 (20 Jul 2023 05:34), Max: 98 (20 Jul 2023 05:34)  HR: 79 (20 Jul 2023 05:34) (76 - 79)  BP: 131/63 (20 Jul 2023 05:34) (131/63 - 134/65)  BP(mean): --  RR: 18 (20 Jul 2023 05:34) (18 - 18)  SpO2: 98% (20 Jul 2023 05:34) (98% - 98%)                                             --------------------------------------------------------------  LABS:                        14.6   6.71  )-----------( 174      ( 20 Jul 2023 08:13 )             43.4     07-20    137  |  103  |  14  ----------------------------<  209<H>  4.2   |  23  |  1.0    Ca    9.5      20 Jul 2023 08:13  Phos  3.1     07-19  Mg     1.9     07-19    TPro  6.6  /  Alb  3.9  /  TBili  0.3  /  DBili  x   /  AST  23  /  ALT  47<H>  /  AlkPhos  87  07-20    PT/INR - ( 20 Jul 2023 08:13 )   PT: 11.70 sec;   INR: 1.03 ratio         PTT - ( 20 Jul 2023 08:13 )  PTT:33.5 sec  Urinalysis Basic - ( 20 Jul 2023 08:13 )    Color: x / Appearance: x / SG: x / pH: x  Gluc: 209 mg/dL / Ketone: x  / Bili: x / Urobili: x   Blood: x / Protein: x / Nitrite: x   Leuk Esterase: x / RBC: x / WBC x   Sq Epi: x / Non Sq Epi: x / Bacteria: x                                                            --------------------------------------------------------------  PHYSICAL EXAM:  GENERAL: Awake, NAD  LUNGS: Clear to auscultation bilaterally  HEART: S1/S2.   ABD: Soft, non-tender, non-distended.  EXT/NEURO: R 2nd digit swollen, skin darkened, I/D site open with slight pus

## 2023-07-20 NOTE — PROGRESS NOTE ADULT - SUBJECTIVE AND OBJECTIVE BOX
JAVI JUDD  [facility] (Back) 024 B ([facility] (Back))      Patient was evaluated and examined  by bedside, no active complains         REVIEW OF SYSTEMS:  please see pertinent positives mentioned above, all other 12 ROS negative      T(C): , Max: 36.7 (07-19-23 @ 14:02)  HR: 79 (07-20-23 @ 05:34)  BP: 131/63 (07-20-23 @ 05:34)  RR: 18 (07-20-23 @ 05:34)  SpO2: 98% (07-20-23 @ 05:34)  CAPILLARY BLOOD GLUCOSE      POCT Blood Glucose.: 237 mg/dL (20 Jul 2023 11:56)  POCT Blood Glucose.: 181 mg/dL (20 Jul 2023 08:05)  POCT Blood Glucose.: 224 mg/dL (19 Jul 2023 21:28)  POCT Blood Glucose.: 225 mg/dL (19 Jul 2023 16:43)      PHYSICAL EXAM:  General: NAD, AAOX3, patient is laying comfortably in bed  HEENT: AT, NC, Supple, NO JVD, NO CB  Lungs: CTA B/L, no wheezing, no rhonchi  CVS: normal S1, S2, RRR, NO M/G/R  Abdomen: soft, bowel sounds present, non-tender, non-distended  Extremities: no edema, no clubbing, no cyanosis, positive peripheral pulses b/l  Neuro: no acute focal neurological deficits  Skin: right index finger, covered with dressing      LAB  CBC  Date: 07-20-23 @ 08:13  Mean cell Wahtekvgyq39.3  Mean cell Hemoglobin Conc33.6  Mean cell Volum 87.0  Platelet count-Automate 174  RBC Count 4.99  Red Cell Distrib Width13.9  WBC Count6.71  % Albumin, Urine--  Hematocrit 43.4  Hemoglobin 14.6  CBC  Date: 07-19-23 @ 07:16  Mean cell Ttqvzhmueg62.6  Mean cell Hemoglobin Conc32.7  Mean cell Volum 87.5  Platelet count-Automate 174  RBC Count 5.10  Red Cell Distrib Width13.9  WBC Count6.16  % Albumin, Urine--  Hematocrit 44.6  Hemoglobin 14.6  CBC  Date: 07-18-23 @ 08:17  Mean cell Dnwpwusegq95.4  Mean cell Hemoglobin Conc33.5  Mean cell Volum 87.8  Platelet count-Automate 176  RBC Count 5.00  Red Cell Distrib Width14.0  WBC Count5.63  % Albumin, Urine--  Hematocrit 43.9  Hemoglobin 14.7            BMP  07-20-23 @ 08:13  Blood Gas Arterial-Calcium,Ionized--  Blood Urea Nitrogen, Serum 14 mg/dL [10 - 20]  Carbon Dioxide, Serum23 mmol/L [17 - 32]  Chloride, Ifupx261 mmol/L [98 - 110]  Creatinie, Serum1.0 mg/dL [0.7 - 1.5]  Glucose, Wluzc521 mg/dL<H> [70 - 99]  Potassium, Serum4.2 mmol/L [3.5 - 5.0]  Sodium, Serum 137 mmol/L [135 - 146]  BMP  07-19-23 @ 07:16  Blood Gas Arterial-Calcium,Ionized--  Blood Urea Nitrogen, Serum 15 mg/dL [10 - 20]  Carbon Dioxide, Serum23 mmol/L [17 - 32]  Chloride, Serum99 mmol/L [98 - 110]  Creatinie, Serum1.0 mg/dL [0.7 - 1.5]  Glucose, Uhfej834 mg/dL<H> [70 - 99]  Potassium, Serum4.4 mmol/L [3.5 - 5.0]  Sodium, Serum 131 mmol/L<L> [135 - 146]        PT/INR - ( 20 Jul 2023 08:13 )   PT: 11.70 sec;   INR: 1.03 ratio         PTT - ( 20 Jul 2023 08:13 )  PTT:33.5 sec      Microbiology:    Culture - Blood (collected 07-16-23 @ 14:07)  Source: .Blood Blood-Peripheral  Preliminary Report (07-19-23 @ 23:01):    No growth at 72 Hours        Medications:  acetaminophen     Tablet .. 650 milliGRAM(s) Oral every 6 hours PRN  aspirin  chewable 81 milliGRAM(s) Oral daily  atorvastatin 40 milliGRAM(s) Oral at bedtime  cefTRIAXone   IVPB      cefTRIAXone   IVPB 2000 milliGRAM(s) IV Intermittent every 24 hours  dextrose 5%. 1000 milliLiter(s) IV Continuous <Continuous>  dextrose 5%. 1000 milliLiter(s) IV Continuous <Continuous>  dextrose 50% Injectable 25 Gram(s) IV Push once  dextrose 50% Injectable 12.5 Gram(s) IV Push once  dextrose 50% Injectable 25 Gram(s) IV Push once  dextrose Oral Gel 15 Gram(s) Oral once PRN  enoxaparin Injectable 40 milliGRAM(s) SubCutaneous every 24 hours  glucagon  Injectable 1 milliGRAM(s) IntraMuscular once  insulin glargine Injectable (LANTUS) 28 Unit(s) SubCutaneous at bedtime  insulin lispro (ADMELOG) corrective regimen sliding scale   SubCutaneous three times a day before meals  insulin lispro Injectable (ADMELOG) 6 Unit(s) SubCutaneous three times a day before meals  losartan 25 milliGRAM(s) Oral daily  melatonin 3 milliGRAM(s) Oral at bedtime PRN  metoclopramide 10 milliGRAM(s) Oral once  oxycodone    5 mG/acetaminophen 325 mG 1 Tablet(s) Oral every 6 hours PRN  pantoprazole    Tablet 40 milliGRAM(s) Oral before breakfast  polyethylene glycol 3350 17 Gram(s) Oral daily  senna 2 Tablet(s) Oral at bedtime  tamsulosin 0.4 milliGRAM(s) Oral at bedtime  traMADol 25 milliGRAM(s) Oral every 4 hours PRN  vancomycin  IVPB 1000 milliGRAM(s) IV Intermittent every 12 hours        Assessment and Plan:  Patient is a 67 y/o Male with a pmhx of DMII, CAD s/p CABG(2015), HTN, HLD, SHx of B/L shoulder and Lt. knee replacement presents with RT 1st digit swelling s/p I&D of paronychia on 7/12    #RT 1st digit swelling with concern for skin and soft tissue infection  - pt previously presented with a paronychia on the same finger s/p I&D on 7/12  - failed Augmentin 3 days op  - x-ray showed no fractures/dislocation  - CT hand: Cortical erosive changes and periosteal reaction at the distal aspect of the middle phalanx of the second digit. Associated soft tissue swelling. This may reflect early osteomyelitis versus erosive degenerative change.  - MRI hand was indeterminate for OM; demonstrated soft tissue infection  - Ortho declined a bone biopsy  - ESR/CRP 40/24  - ID recommended that picc line be placed with vancomycin and ceftriaxone for 3 weeks and then po bactrim  - continue on Vancomycin and ceftriaxone   - trend esr/crp weekly  - outpatient ID f/up and Hand Sx. f/up       #DM II  - Lantus 28 units and lispro 6 units tid  - Hba1c is 8.2      #CAD s/p CABG(2015)  #HTN/HLD/GERD  - c/w aspirin, losartan, statin    #BPH- c/w tamsulosin    #Progress Note Handoff: Pending Picc line placement, than d/c to STR  Family discussion: d/c plan was d/w pt. by bedside Disposition: STR    Total time spent to complete patient's bedside assessment, review medical chart, discuss medical plan of care with covering medical team was more than 35 minutes with >50% of time spent face to face with patient, discussion with patient/family and/or coordination of care

## 2023-07-20 NOTE — DISCHARGE NOTE PROVIDER - NSDCCPCAREPLAN_GEN_ALL_CORE_FT
PRINCIPAL DISCHARGE DIAGNOSIS  Diagnosis: Infection of finger  Assessment and Plan of Treatment: You come to the hospital for an infection on your finger. In the hospital you received IV antibiotics. Imaging was indeterminate for bone infection. You had a PICC line inserted to complete IV antibiotics at home followed by oral antibiotics. You will need weekly blood tests and telehealth follow up with the infectious disease doctor.      SECONDARY DISCHARGE DIAGNOSES  Diagnosis: Diabetes mellitus  Assessment and Plan of Treatment:     Diagnosis: CAD (coronary artery disease)  Assessment and Plan of Treatment:      PRINCIPAL DISCHARGE DIAGNOSIS  Diagnosis: Infection of finger  Assessment and Plan of Treatment: You come to the hospital for an infection on your finger. In the hospital you received IV antibiotics. Imaging was indeterminate for bone infection. You had a PICC line inserted to complete IV antibiotics at home followed by oral antibiotics. You will need weekly blood tests ( CBC, CMP, ESR/CRP ,  Vanc trough)  and telehealth follow up with the infectious disease doctor.  - ID follow-up with Dr. Harinder Bender for Telehealth. We will call the patient between 10:30-6:30      [address] [facility]       [phone]       Fax [phone]   The end date for your antibiotics is 8/14/2023. You may need to take more antibotics after your follow up with the ID doctor.   Please make sure to also follow up with your primary care docotor within 1 week of discharge         SECONDARY DISCHARGE DIAGNOSES  Diagnosis: Diabetes mellitus  Assessment and Plan of Treatment: Please continue your home insulin regimen    Diagnosis: CAD (coronary artery disease)  Assessment and Plan of Treatment: Continue your home medications

## 2023-07-20 NOTE — DISCHARGE NOTE NURSING/CASE MANAGEMENT/SOCIAL WORK - NSDCVIVACCINE_GEN_ALL_CORE_FT
Tdap; 14-Jul-2023 20:55; Stephenie Lucio (RN); [employer]; [id number] (Exp. Date: 03-Oct-2025); IntraMuscular; Deltoid Left.; 0.5 milliLiter(s); VIS (VIS Published: 09-May-2013, VIS Presented: 14-Jul-2023);

## 2023-07-20 NOTE — PROGRESS NOTE ADULT - SUBJECTIVE AND OBJECTIVE BOX
Plastic Surgery    SUBJECTIVE: Pt seen and examined on rounds with team. No acute events overnight.        OBJECTIVE    PHYSICAL EXAM:   General: NAD, Lying in bed   Neuro: Awake and alert  Ext: right IF with small area of skin breakdown over dorsum of finger, localized erythema and tenderness. No fluctuance      VITALS  T(C): 36.7 (07-20-23 @ 05:34), Max: 36.7 (07-19-23 @ 14:02)  HR: 79 (07-20-23 @ 05:34) (72 - 79)  BP: 131/63 (07-20-23 @ 05:34) (131/63 - 150/72)  RR: 18 (07-20-23 @ 05:34) (18 - 18)  SpO2: 98% (07-20-23 @ 05:34) (98% - 99%)  CAPILLARY BLOOD GLUCOSE      POCT Blood Glucose.: 224 mg/dL (19 Jul 2023 21:28)  POCT Blood Glucose.: 225 mg/dL (19 Jul 2023 16:43)  POCT Blood Glucose.: 217 mg/dL (19 Jul 2023 11:29)      Is/Os      MEDICATIONS (STANDING): aspirin  chewable 81 milliGRAM(s) Oral daily  atorvastatin 40 milliGRAM(s) Oral at bedtime  cefTRIAXone   IVPB      cefTRIAXone   IVPB 2000 milliGRAM(s) IV Intermittent every 24 hours  dextrose 5%. 1000 milliLiter(s) IV Continuous <Continuous>  dextrose 5%. 1000 milliLiter(s) IV Continuous <Continuous>  dextrose 50% Injectable 25 Gram(s) IV Push once  dextrose 50% Injectable 25 Gram(s) IV Push once  dextrose 50% Injectable 12.5 Gram(s) IV Push once  enoxaparin Injectable 40 milliGRAM(s) SubCutaneous every 24 hours  glucagon  Injectable 1 milliGRAM(s) IntraMuscular once  insulin glargine Injectable (LANTUS) 28 Unit(s) SubCutaneous at bedtime  insulin lispro (ADMELOG) corrective regimen sliding scale   SubCutaneous three times a day before meals  insulin lispro Injectable (ADMELOG) 6 Unit(s) SubCutaneous three times a day before meals  losartan 25 milliGRAM(s) Oral daily  metoclopramide 10 milliGRAM(s) Oral once  pantoprazole    Tablet 40 milliGRAM(s) Oral before breakfast  polyethylene glycol 3350 17 Gram(s) Oral daily  senna 2 Tablet(s) Oral at bedtime  tamsulosin 0.4 milliGRAM(s) Oral at bedtime  vancomycin  IVPB 1000 milliGRAM(s) IV Intermittent every 12 hours    MEDICATIONS (PRN):acetaminophen     Tablet .. 650 milliGRAM(s) Oral every 6 hours PRN Temp greater or equal to 38C (100.4F), Mild Pain (1 - 3)  dextrose Oral Gel 15 Gram(s) Oral once PRN Blood Glucose LESS THAN 70 milliGRAM(s)/deciliter  melatonin 3 milliGRAM(s) Oral at bedtime PRN Insomnia  oxycodone    5 mG/acetaminophen 325 mG 1 Tablet(s) Oral every 6 hours PRN Severe Pain (7 - 10)  traMADol 25 milliGRAM(s) Oral every 4 hours PRN Moderate Pain (4 - 6)      LABS  CBC (07-19 @ 07:16)                              14.6                           6.16    )----------------(  174        55.5  % Neutrophils, 32.3  % Lymphocytes, ANC: 3.42                                44.6      BMP (07-19 @ 07:16)             131<L>  |  99      |  15    		Ca++ --      Ca 9.6                ---------------------------------( 205<H>		Mg 1.9                4.4     |  23      |  1.0   			Ph 3.1       LFTs (07-19 @ 07:16)      TPro 6.8 / Alb 4.1 / TBili 0.3 / DBili -- / AST 38 / ALT 53<H> / AlkPhos 85              IMAGING STUDIES

## 2023-07-20 NOTE — PROGRESS NOTE ADULT - ASSESSMENT
68M with R IF paronychia. Bedside I&D performed on 7/16 with 1 ml of purulence expressed    -warm soapy soaks TID for 15-20 minutes  - no indication for bone biopsy. C/w abx. No additional surgical intervention at this time.  -F/u with Dr. Castaneda once discharged    Demetrius Lopez  Plastic and Reconstructive Surgery, PGY-3  [facility]: [id number]  [facility]: [phone]  [facility]: Spectra [phone] (Green team)

## 2023-07-20 NOTE — DISCHARGE NOTE PROVIDER - CARE PROVIDERS DIRECT ADDRESSES
,DirectAddress_Unknown,[email or web address] ,DirectAddress_Unknown,[email or web address],DirectAddress_Unknown

## 2023-07-20 NOTE — DISCHARGE NOTE PROVIDER - NSDCFUADDINST_GEN_ALL_CORE_FT
Wound care:   warm soapy soaks three times a day for 15-20 minutes    Weekly CBC, CMP, ESR/CRP ,  Vanc trough  ID follow-up with Dr. Harinder Bender for Telehealth. We will call the patient between 10:30-6:30      [address] [facility]       [phone]       Fax [phone]

## 2023-07-20 NOTE — DISCHARGE NOTE PROVIDER - NSDCMRMEDTOKEN_GEN_ALL_CORE_FT
Actamin 325 mg oral tablet: 2 tab(s) orally every 6 hours, As needed, Temp greater or equal to 38C (100.4F)  Admelog 100 units/mL injectable solution: injectable 3 times a day (before meals) as needed for  high blood sugar According to the sliding scale  aspirin 81 mg oral tablet: 1 tab(s) orally once a day  Basaglar KwikPen 100 units/mL subcutaneous solution: 24 unit(s) subcutaneous once a day (at bedtime)  losartan 25 mg oral tablet: 1 tab(s) orally once a day  metFORMIN 1000 mg oral tablet: 1 tab(s) orally 2 times a day  omeprazole 40 mg oral delayed release capsule: 1 cap(s) orally once a day  Ozempic (0.25 mg or 0.5 mg dose): 1 dose(s) subcutaneous once a week  pravastatin 20 mg oral tablet: 1 tab(s) orally once a day  tamsulosin 0.4 mg oral capsule: 1 cap(s) orally once a day (at bedtime)  Vitamin D3 50,000 intl units (1250 mcg) oral capsule: 1 cap(s) orally once a week   Actamin 325 mg oral tablet: 2 tab(s) orally every 6 hours, As needed, Temp greater or equal to 38C (100.4F)  Admelog 100 units/mL injectable solution: injectable 3 times a day (before meals) as needed for  high blood sugar According to the sliding scale  aspirin 81 mg oral tablet: 1 tab(s) orally once a day  Basaglar KwikPen 100 units/mL subcutaneous solution: 24 unit(s) subcutaneous once a day (at bedtime)  cefTRIAXone: 2 billion vector genomes intravenous once a day end date 8/4/23  losartan 25 mg oral tablet: 1 tab(s) orally once a day  metFORMIN 1000 mg oral tablet: 1 tab(s) orally 2 times a day  omeprazole 40 mg oral delayed release capsule: 1 cap(s) orally once a day  Ozempic (0.25 mg or 0.5 mg dose): 1 dose(s) subcutaneous once a week  pravastatin 20 mg oral tablet: 1 tab(s) orally once a day  tamsulosin 0.4 mg oral capsule: 1 cap(s) orally once a day (at bedtime)  vancomycin 1 g intravenous injection: 1 gram(s) intravenous every 12 hours end date 8/4/2023  Vitamin D3 50,000 intl units (1250 mcg) oral capsule: 1 cap(s) orally once a week

## 2023-07-20 NOTE — DISCHARGE NOTE PROVIDER - HOSPITAL COURSE
History of Present Illness:   68 yrs old M with PMHx of  DM II on insulin, CAD s/p CABG(2015), HTN, HLD, SHx of B/L shoulder and Lt. knee replacement, presents with right index finger swelling and pain.    Pt. reports that about 5-6 days ago he started feeling burning pain followed by a rash over the proximal part of the finger nail. Over next few days it bacme more swollen so he initially presented to the ED on 7/12/2023, when he underwent bedside I&D by ED with blood and purulent drainage and was discharged the same day on PO Augmentin. Patient returned to ED today, because pain and swelling of his right IF was not improving. Patient states he was taking his PO antibiotics. Patient continues to complain of right IF pain and swelling. Pt. reports having pain over the Rt index and middle finger and radiating down to the dorsum of the hand. Denied any Hx of cold sores or similar rash in the past. Pt. is retired and likes to cook at home(but denied any cuts or injury). Pt. denied any preceding trauma, bites or cut prior to the infection, sick contacts. Denies numbness and tingling, discharge. Denies fever, chills, nausea, vomiting, chest pain, urinary symptoms. Endorsed having occasional constipation.    In ED, vital were stable.  Labs significant for mildly elevated ESR/CRP 40/24, Cr 1.4(baseline unknown), suspected AMBER over CKD3a,   X-ray Rt hand showed no fracture or dislocation  Pt. was seen by Hand Sx, there was no drainable abscess. Pt. admitted for IV Abx and monitoring.    Hospital course:  He was continued on IV vanc and ceftriaxone  CT hand showed Cortical erosive changes and periosteal reaction at the distal aspect of the middle phalanx of the second digit. Associated soft tissue swelling. This may reflect early osteomyelitis versus erosive degenerative change.  MRI hand was indeterminate for OM; demonstrated soft tissue infection  Ortho declined a bone biopsy   PICC line was inserted for completion of 3 weeks IV abx  Per Plastic surgery, local wound care was continued with soapy water soaks TID    He will need out pt follow up with ID with weekly   - Vanc/Ceftriaxone to complete 3 weeks (abx started 7/14, 3 weeks is 8/4), possible PO bactrim to follow end 8/25 (6 weeks total)  - Weekly CBC, CMP, ESR/CRP ,  Vanc trough  - ID follow-up with Dr. Harinder Bender for Telehealth.    Other medical care as below:  #DM II  - Lantus 28 units and lispro 6 units tid  - ISS  - Hba1c is 8.2  - lipid profile shows elevated ; unclear if truly done while fasting    #CAD s/p CABG(2015)  #HTN/HLD/GERD  - c/w aspirin, losartan, statin    #BPH  - c/w tamsulosin    #Misc  -DVT prophylaxis: Lovenox  -GI prophylaxis: PPI  -Diet: DASH/CC  -Code status: Full  -Activity: AAT         History of Present Illness:   68 yrs old M with PMHx of  DM II on insulin, CAD s/p CABG(2015), HTN, HLD, SHx of B/L shoulder and Lt. knee replacement, presents with right index finger swelling and pain.    Pt. reports that about 5-6 days ago he started feeling burning pain followed by a rash over the proximal part of the finger nail. Over next few days it bacme more swollen so he initially presented to the ED on 7/12/2023, when he underwent bedside I&D by ED with blood and purulent drainage and was discharged the same day on PO Augmentin. Patient returned to ED today, because pain and swelling of his right IF was not improving. Patient states he was taking his PO antibiotics. Patient continues to complain of right IF pain and swelling. Pt. reports having pain over the Rt index and middle finger and radiating down to the dorsum of the hand. Denied any Hx of cold sores or similar rash in the past. Pt. is retired and likes to cook at home(but denied any cuts or injury). Pt. denied any preceding trauma, bites or cut prior to the infection, sick contacts. Denies numbness and tingling, discharge. Denies fever, chills, nausea, vomiting, chest pain, urinary symptoms. Endorsed having occasional constipation.    In ED, vital were stable.  Labs significant for mildly elevated ESR/CRP 40/24, Cr 1.4(baseline unknown), suspected AMBER over CKD3a,   X-ray Rt hand showed no fracture or dislocation  Pt. was seen by Hand Sx, there was no drainable abscess. Pt. admitted for IV Abx and monitoring.    Hospital course:  He was continued on IV vanc and ceftriaxone  CT hand showed Cortical erosive changes and periosteal reaction at the distal aspect of the middle phalanx of the second digit. Associated soft tissue swelling. This may reflect early osteomyelitis versus erosive degenerative change.  MRI hand was indeterminate for OM; demonstrated soft tissue infection  Ortho declined a bone biopsy   PICC line was inserted for completion of 3 weeks IV abx, end date August 4 2023, with possible continuation of PO abx after f/u with ID for another 3 weeks.   Per Plastic surgery, local wound care was continued with soapy water soaks TID    He will need out pt follow up with ID with weekly   - Vanc/Ceftriaxone to complete 3 weeks (abx started 7/14, 3 weeks is 8/4), possible PO bactrim to follow end 8/25 (6 weeks total)  - Weekly CBC, CMP, ESR/CRP ,  Vanc trough  - ID follow-up with Dr. Harinder Bender for Telehealth.    Other medical care as below:  #DM II  - Lantus 28 units and lispro 6 units tid  - ISS  - Hba1c is 8.2  - lipid profile shows elevated ; unclear if truly done while fasting    #CAD s/p CABG(2015)  #HTN/HLD/GERD  - c/w aspirin, losartan, statin    #BPH  - c/w tamsulosin

## 2023-07-20 NOTE — PROGRESS NOTE ADULT - ASSESSMENT
ASSESSMENT  68 yrs old M with PMHx of  DM II on insulin, CAD s/p CABG(2015), HTN, HLD, SHx of B/L shoulder and Lt. knee replacement, presents with right index finger swelling and pain.  Pt. reports that about 5-6 days ago he started feeling burning pain followed by a rash over the proximal part of the finger nail. Over next few days it bacme more swollen so he initially presented to the ED on 7/12/2023, when he underwent bedside I&D by ED with blood and purulent drainage and was discharged the same day on PO Augmentin. Patient returned to ED today, because pain and swelling of his right IF was not improving. Patient states he was taking his PO antibiotics.     IMPRESSION  #R 1st finger paronychia, cellulitis , possible osteomyelitis (MRI inconclusive)  s/p I&D- no WCX  denies any trauma, bite, etc to the finger  < from: MR Hand w/wo IV Cont, Right (07.17.23 @ 17:40) >  Cellulitis of the distal index finger, with an incision noted at the nailbed. No drainable abscess. No definite septic arthritis. Mild signal change within the distal phalanx is indeterminate for osteomyelitis.  < from: CT Hand w/ IV Cont, Right (07.15.23 @ 22:47) >  Question of cortical erosive changes and periosteal reaction at the   distal aspect of the middle phalanx of the second digit. Associated soft   tissue swelling. This may reflect early osteomyelitis versus erosive   degenerative change. Consider follow-up MRI with IV contrast.  No locules of air to indicate a gas-forming infectious process. No   abscess.  MRSA PCR Result.: Negative (07-18-23 @ 11:05)  < from: Xray Hand 3 Views, Right (07.14.23 @ 21:57) >  No evidence of acute fracture or dislocation.  Sedimentation Rate, Erythrocyte: 37 mm/Hr (07-15-23 @ 07:30)  C-Reactive Protein, Serum: 18.6 mg/L (07-15-23 @ 07:30)  C-Reactive Protein, Serum: 24.3 mg/L (07-14-23 @ 21:38)  Sedimentation Rate, Erythrocyte: 40 mm/Hr (07-14-23 @ 21:38)  #Sepsis ruled out on admission   #Obesity BMI (kg/m2): 30.5, 32.2  #IDDM   a1c 8.3  #Abx allergy: amoxicillin (Rash; Pruritus; Hives)  crcl 67  Creatinine: 1.1 (07-15-23 @ 07:30)    Height (cm): 168 (07-14-23 @ 19:24)  Weight (kg): 86.2 (07-14-23 @ 19:24)    RECOMMENDATIONS  - MRI  indeterminate for osteomyelitis  - Vanc dosing per ID pharmacy   - Ceftriaxone 2g q24h IV  - No WCX sent from I&D  - Appreciate Ortho/Plastics consult  - Spoke to daughter, discussed MRI inconclusive, discussed possible IV antibiotics at home. Discussed risks/benefits of IV/PO therapy/OVIVA trial. Given DM, I prefer to start with IV course and can transition to PO.   - As plan for IV will need a PICC (cannot given Vanc via midline), and Vanc/Ceftriaxone as above to complete 3 weeks (abx started 7/14, 3 weeks is 8/4) (will f/u ESR, CRP as outpatient , possible repeat MRI), possible PO to follow end 8/25 (6 weeks total)  - Weekly CBC, CMP, ESR/CRP ,  Vanc trough  - ID follow-up with Dr. Harinder Bender for Telehealth. We will call the patient between 10:30-6:30      [address]       [phone]       Fax [phone]     If any questions, please send a message or call on [facility] [employer]  Please continue to update ID with any pertinent new laboratory or radiographic findings  #[id number]

## 2023-07-20 NOTE — DISCHARGE NOTE NURSING/CASE MANAGEMENT/SOCIAL WORK - NSDCPEFALRISK_GEN_ALL_CORE
For information on Fall & Injury Prevention, visit: [email or web address] OR  [email or web address] OR  https://www.cdc.gov/steadi/patient.html

## 2023-07-20 NOTE — PROCEDURE NOTE - NSINFORMCONSENT_GEN_A_CORE
patient via Bulgarian /Benefits, risks, and possible complications of procedure explained to patient/caregiver who verbalized understanding and gave written consent.

## 2023-07-21 LAB
ALBUMIN SERPL ELPH-MCNC: 3.9 G/DL — SIGNIFICANT CHANGE UP (ref 3.5–5.2)
ALP SERPL-CCNC: 84 U/L — SIGNIFICANT CHANGE UP (ref 30–115)
ALT FLD-CCNC: 43 U/L — HIGH (ref 0–41)
ANION GAP SERPL CALC-SCNC: 12 MMOL/L — SIGNIFICANT CHANGE UP (ref 7–14)
AST SERPL-CCNC: 19 U/L — SIGNIFICANT CHANGE UP (ref 0–41)
BILIRUB SERPL-MCNC: 0.3 MG/DL — SIGNIFICANT CHANGE UP (ref 0.2–1.2)
BUN SERPL-MCNC: 11 MG/DL — SIGNIFICANT CHANGE UP (ref 10–20)
CALCIUM SERPL-MCNC: 9.3 MG/DL — SIGNIFICANT CHANGE UP (ref 8.4–10.5)
CHLORIDE SERPL-SCNC: 105 MMOL/L — SIGNIFICANT CHANGE UP (ref 98–110)
CO2 SERPL-SCNC: 22 MMOL/L — SIGNIFICANT CHANGE UP (ref 17–32)
CREAT SERPL-MCNC: 0.9 MG/DL — SIGNIFICANT CHANGE UP (ref 0.7–1.5)
CULTURE RESULTS: SIGNIFICANT CHANGE UP
EGFR: 94 ML/MIN/1.73M2 — SIGNIFICANT CHANGE UP
GLUCOSE BLDC GLUCOMTR-MCNC: 190 MG/DL — HIGH (ref 70–99)
GLUCOSE BLDC GLUCOMTR-MCNC: 220 MG/DL — HIGH (ref 70–99)
GLUCOSE BLDC GLUCOMTR-MCNC: 227 MG/DL — HIGH (ref 70–99)
GLUCOSE BLDC GLUCOMTR-MCNC: 292 MG/DL — HIGH (ref 70–99)
GLUCOSE BLDC GLUCOMTR-MCNC: 313 MG/DL — HIGH (ref 70–99)
GLUCOSE BLDC GLUCOMTR-MCNC: 391 MG/DL — HIGH (ref 70–99)
GLUCOSE SERPL-MCNC: 205 MG/DL — HIGH (ref 70–99)
HCT VFR BLD CALC: 43.2 % — SIGNIFICANT CHANGE UP (ref 42–52)
HGB BLD-MCNC: 14.3 G/DL — SIGNIFICANT CHANGE UP (ref 14–18)
MCHC RBC-ENTMCNC: 28.7 PG — SIGNIFICANT CHANGE UP (ref 27–31)
MCHC RBC-ENTMCNC: 33.1 G/DL — SIGNIFICANT CHANGE UP (ref 32–37)
MCV RBC AUTO: 86.7 FL — SIGNIFICANT CHANGE UP (ref 80–94)
NRBC # BLD: 0 /100 WBCS — SIGNIFICANT CHANGE UP (ref 0–0)
PLATELET # BLD AUTO: 186 K/UL — SIGNIFICANT CHANGE UP (ref 130–400)
PMV BLD: 11.9 FL — HIGH (ref 7.4–10.4)
POTASSIUM SERPL-MCNC: 4.4 MMOL/L — SIGNIFICANT CHANGE UP (ref 3.5–5)
POTASSIUM SERPL-SCNC: 4.4 MMOL/L — SIGNIFICANT CHANGE UP (ref 3.5–5)
PROT SERPL-MCNC: 6.5 G/DL — SIGNIFICANT CHANGE UP (ref 6–8)
RBC # BLD: 4.98 M/UL — SIGNIFICANT CHANGE UP (ref 4.7–6.1)
RBC # FLD: 14 % — SIGNIFICANT CHANGE UP (ref 11.5–14.5)
SODIUM SERPL-SCNC: 139 MMOL/L — SIGNIFICANT CHANGE UP (ref 135–146)
SPECIMEN SOURCE: SIGNIFICANT CHANGE UP
VANCOMYCIN TROUGH SERPL-MCNC: 10.3 UG/ML — HIGH (ref 5–10)
VANCOMYCIN TROUGH SERPL-MCNC: 16.4 UG/ML — HIGH (ref 5–10)
WBC # BLD: 6.35 K/UL — SIGNIFICANT CHANGE UP (ref 4.8–10.8)
WBC # FLD AUTO: 6.35 K/UL — SIGNIFICANT CHANGE UP (ref 4.8–10.8)

## 2023-07-21 PROCEDURE — 99232 SBSQ HOSP IP/OBS MODERATE 35: CPT

## 2023-07-21 RX ORDER — INSULIN LISPRO 100/ML
5 VIAL (ML) SUBCUTANEOUS ONCE
Refills: 0 | Status: COMPLETED | OUTPATIENT
Start: 2023-07-21 | End: 2023-07-21

## 2023-07-21 RX ADMIN — ENOXAPARIN SODIUM 40 MILLIGRAM(S): 100 INJECTION SUBCUTANEOUS at 11:28

## 2023-07-21 RX ADMIN — INSULIN GLARGINE 28 UNIT(S): 100 INJECTION, SOLUTION SUBCUTANEOUS at 22:04

## 2023-07-21 RX ADMIN — PANTOPRAZOLE SODIUM 40 MILLIGRAM(S): 20 TABLET, DELAYED RELEASE ORAL at 05:43

## 2023-07-21 RX ADMIN — Medication 2: at 17:33

## 2023-07-21 RX ADMIN — Medication 6 UNIT(S): at 12:04

## 2023-07-21 RX ADMIN — ATORVASTATIN CALCIUM 40 MILLIGRAM(S): 80 TABLET, FILM COATED ORAL at 22:04

## 2023-07-21 RX ADMIN — Medication 250 MILLIGRAM(S): at 22:03

## 2023-07-21 RX ADMIN — Medication 6 UNIT(S): at 17:34

## 2023-07-21 RX ADMIN — CEFTRIAXONE 100 MILLIGRAM(S): 500 INJECTION, POWDER, FOR SOLUTION INTRAMUSCULAR; INTRAVENOUS at 14:04

## 2023-07-21 RX ADMIN — Medication 5 UNIT(S): at 22:05

## 2023-07-21 RX ADMIN — Medication 250 MILLIGRAM(S): at 12:06

## 2023-07-21 RX ADMIN — Medication 6 UNIT(S): at 09:30

## 2023-07-21 RX ADMIN — Medication 3: at 12:03

## 2023-07-21 RX ADMIN — LOSARTAN POTASSIUM 25 MILLIGRAM(S): 100 TABLET, FILM COATED ORAL at 05:43

## 2023-07-21 RX ADMIN — Medication 250 MILLIGRAM(S): at 01:12

## 2023-07-21 RX ADMIN — TAMSULOSIN HYDROCHLORIDE 0.4 MILLIGRAM(S): 0.4 CAPSULE ORAL at 22:04

## 2023-07-21 RX ADMIN — Medication 81 MILLIGRAM(S): at 11:28

## 2023-07-21 RX ADMIN — Medication 2: at 09:29

## 2023-07-21 NOTE — PROGRESS NOTE ADULT - SUBJECTIVE AND OBJECTIVE BOX
JAVI JUDD 68y Male  MRN#: [id number]     Hospital Day: 7d    Pt is currently admitted with the primary diagnosis of     Overnight events   -No major overnight events                                          ----------------------------------------------------------  OBJECTIVE  PAST MEDICAL & SURGICAL HISTORY  ASHD (arteriosclerotic heart disease)    Hypertension, unspecified type    Hyperlipidemia, unspecified hyperlipidemia type    Type 2 diabetes mellitus with complication, unspecified long term insulin use status    History of open heart surgery  CABG x 4, 5 years ago                                              -----------------------------------------------------------  MEDICATIONS:  STANDING MEDICATIONS  aspirin  chewable 81 milliGRAM(s) Oral daily  atorvastatin 40 milliGRAM(s) Oral at bedtime  cefTRIAXone   IVPB      cefTRIAXone   IVPB 2000 milliGRAM(s) IV Intermittent every 24 hours  dextrose 5%. 1000 milliLiter(s) IV Continuous <Continuous>  dextrose 5%. 1000 milliLiter(s) IV Continuous <Continuous>  dextrose 50% Injectable 25 Gram(s) IV Push once  dextrose 50% Injectable 25 Gram(s) IV Push once  dextrose 50% Injectable 12.5 Gram(s) IV Push once  enoxaparin Injectable 40 milliGRAM(s) SubCutaneous every 24 hours  glucagon  Injectable 1 milliGRAM(s) IntraMuscular once  insulin glargine Injectable (LANTUS) 28 Unit(s) SubCutaneous at bedtime  insulin lispro (ADMELOG) corrective regimen sliding scale   SubCutaneous three times a day before meals  insulin lispro Injectable (ADMELOG) 6 Unit(s) SubCutaneous three times a day before meals  losartan 25 milliGRAM(s) Oral daily  metoclopramide 10 milliGRAM(s) Oral once  pantoprazole    Tablet 40 milliGRAM(s) Oral before breakfast  polyethylene glycol 3350 17 Gram(s) Oral daily  senna 2 Tablet(s) Oral at bedtime  tamsulosin 0.4 milliGRAM(s) Oral at bedtime  vancomycin  IVPB 1000 milliGRAM(s) IV Intermittent every 12 hours    PRN MEDICATIONS  acetaminophen     Tablet .. 650 milliGRAM(s) Oral every 6 hours PRN  dextrose Oral Gel 15 Gram(s) Oral once PRN  melatonin 3 milliGRAM(s) Oral at bedtime PRN  oxycodone    5 mG/acetaminophen 325 mG 1 Tablet(s) Oral every 6 hours PRN  traMADol 25 milliGRAM(s) Oral every 4 hours PRN                                            ------------------------------------------------------------  VITAL SIGNS: Last 24 Hours  T(C): 36.6 (21 Jul 2023 11:50), Max: 37.2 (21 Jul 2023 04:35)  T(F): 97.9 (21 Jul 2023 11:50), Max: 98.9 (21 Jul 2023 04:35)  HR: 69 (21 Jul 2023 11:50) (66 - 83)  BP: 150/72 (21 Jul 2023 11:50) (122/68 - 150/72)  BP(mean): 110 (21 Jul 2023 00:43) (110 - 110)  RR: 18 (21 Jul 2023 11:50) (18 - 19)  SpO2: 97% (21 Jul 2023 11:50) (97% - 99%)                                             --------------------------------------------------------------  LABS:                        14.3   6.35  )-----------( 186      ( 21 Jul 2023 06:05 )             43.2     07-21    139  |  105  |  11  ----------------------------<  205<H>  4.4   |  22  |  0.9    Ca    9.3      21 Jul 2023 06:05    TPro  6.5  /  Alb  3.9  /  TBili  0.3  /  DBili  x   /  AST  19  /  ALT  43<H>  /  AlkPhos  84  07-21    PT/INR - ( 20 Jul 2023 08:13 )   PT: 11.70 sec;   INR: 1.03 ratio         PTT - ( 20 Jul 2023 08:13 )  PTT:33.5 sec  Urinalysis Basic - ( 21 Jul 2023 06:05 )    Color: x / Appearance: x / SG: x / pH: x  Gluc: 205 mg/dL / Ketone: x  / Bili: x / Urobili: x   Blood: x / Protein: x / Nitrite: x   Leuk Esterase: x / RBC: x / WBC x   Sq Epi: x / Non Sq Epi: x / Bacteria: x                                                            --------------------------------------------------------------  PHYSICAL EXAM:  GENERAL: Awake, alert, oriented to person, place, time, situation. Well-nourished, laying in bed appearing in no acute distress  LUNGS: Clear to auscultation bilaterally  HEART: S1/S2.   ABD: Soft, non-tender, non-distended.  SKIN: No edema    Language preferred Vietnamese      PLAN:    # DVT prophylaxis     # GI prophylaxis     # Diet     # Activity Score (AM-PAC)    #Progress Note Handoff  Pending (specify):  Family discussion:   Disposition:

## 2023-07-21 NOTE — PROGRESS NOTE ADULT - ASSESSMENT
68M with R IF paronychia. Bedside I&D performed on 7/16 with 1 ml of purulence expressed    -warm soapy soaks TID for 15-20 minutes  - no indication for bone biopsy. C/w abx. No additional surgical intervention at this time.  -F/u with Dr. Castaneda once discharged    Demetrius Lopez  Plastic and Reconstructive Surgery, PGY-3  [facility]: [id number]  [facility]: [phone]  [facility]: Spectra [phone] (Green team)

## 2023-07-21 NOTE — PROGRESS NOTE ADULT - SUBJECTIVE AND OBJECTIVE BOX
JAVI JUDD  [facility]N 3A (Back) 024 B ([facility]N 3A (Back))        Patient was evaluated and examined  by bedside, no active complains         REVIEW OF SYSTEMS:  please see pertinent positives mentioned above, all other 12 ROS negative      T(C): , Max: 37.2 (07-21-23 @ 04:35)  HR: 69 (07-21-23 @ 11:50)  BP: 150/72 (07-21-23 @ 11:50)  RR: 18 (07-21-23 @ 11:50)  SpO2: 97% (07-21-23 @ 11:50)  CAPILLARY BLOOD GLUCOSE      POCT Blood Glucose.: 292 mg/dL (21 Jul 2023 11:45)  POCT Blood Glucose.: 227 mg/dL (21 Jul 2023 08:39)  POCT Blood Glucose.: 190 mg/dL (21 Jul 2023 00:26)  POCT Blood Glucose.: 239 mg/dL (20 Jul 2023 22:09)  POCT Blood Glucose.: 203 mg/dL (20 Jul 2023 17:13)      PHYSICAL EXAM:  General: NAD, AAOX3, patient is laying comfortably in bed  HEENT: AT, NC, Supple, NO JVD, NO CB  Lungs: CTA B/L, no wheezing, no rhonchi  CVS: normal S1, S2, RRR, NO M/G/R  Abdomen: soft, bowel sounds present, non-tender, non-distended  Extremities: no edema, no clubbing, no cyanosis, positive peripheral pulses b/l  Neuro: no acute focal neurological deficits  Skin: right index finger, covered with dressing    LAB  CBC  Date: 07-21-23 @ 06:05  Mean cell Xyhmxoimyd14.7  Mean cell Hemoglobin Conc33.1  Mean cell Volum 86.7  Platelet count-Automate 186  RBC Count 4.98  Red Cell Distrib Width14.0  WBC Count6.35  % Albumin, Urine--  Hematocrit 43.2  Hemoglobin 14.3  CBC  Date: 07-20-23 @ 08:13  Mean cell Prynxdeugo36.3  Mean cell Hemoglobin Conc33.6  Mean cell Volum 87.0  Platelet count-Automate 174  RBC Count 4.99  Red Cell Distrib Width13.9  WBC Count6.71  % Albumin, Urine--  Hematocrit 43.4  Hemoglobin 14.6  CBC  Date: 07-19-23 @ 07:16  Mean cell Gijooaybiz41.6  Mean cell Hemoglobin Conc32.7  Mean cell Volum 87.5  Platelet count-Automate 174  RBC Count 5.10  Red Cell Distrib Width13.9  WBC Count6.16  % Albumin, Urine--  Hematocrit 44.6  Hemoglobin 14.6  CBC  Date: 07-18-23 @ 08:17  Mean cell Fjbozewolk36.4  Mean cell Hemoglobin Conc33.5  Mean cell Volum 87.8  Platelet count-Automate 176  RBC Count 5.00  Red Cell Distrib Width14.0  WBC Count5.63  % Albumin, Urine--  Hematocrit 43.9  Hemoglobin 14.7  CBC  Date: 07-17-23 @ 07:22  Mean cell Wxzdtlanqf77.7  Mean cell Hemoglobin Conc32.7  Mean cell Volum 87.9  Platelet count-Automate 154  RBC Count 4.63  Red Cell Distrib Width14.0  WBC Count5.45  % Albumin, Urine--  Hematocrit 40.7  Hemoglobin 13.3  CBC  Date: 07-16-23 @ 06:21  Mean cell Yihnlgpibd75.5  Mean cell Hemoglobin Conc32.3  Mean cell Volum 88.2  Platelet count-Automate 161  RBC Count 4.67  Red Cell Distrib Width14.4  WBC Count6.40  % Albumin, Urine--  Hematocrit 41.2  Hemoglobin 13.3  CBC  Date: 07-15-23 @ 07:30  Mean cell Ejcsepqdae87.2  Mean cell Hemoglobin Conc33.2  Mean cell Volum 88.1  Platelet count-Automate 158  RBC Count 4.72  Red Cell Distrib Width14.5  WBC Count7.20  % Albumin, Urine--  Hematocrit 41.6  Hemoglobin 13.8  CBC  Date: 07-14-23 @ 21:38  Mean cell Vcumjoxiev93.5  Mean cell Hemoglobin Conc32.3  Mean cell Volum 88.3  Platelet count-Automate 171  RBC Count 4.60  Red Cell Distrib Width14.6  WBC Count8.30  % Albumin, Urine--  Hematocrit 40.6  Hemoglobin 13.1    BMP  07-21-23 @ 06:05  Blood Gas Arterial-Calcium,Ionized--  Blood Urea Nitrogen, Serum 11 mg/dL [10 - 20]  Carbon Dioxide, Serum22 mmol/L [17 - 32]  Chloride, Tdhep191 mmol/L [98 - 110]  Creatinie, Serum0.9 mg/dL [0.7 - 1.5]  Glucose, Mohwa439 mg/dL<H> [70 - 99]  Potassium, Serum4.4 mmol/L [3.5 - 5.0]  Sodium, Serum 139 mmol/L [135 - 146]  [facility]  07-20-23 @ 08:13  Blood Gas Arterial-Calcium,Ionized--  Blood Urea Nitrogen, Serum 14 mg/dL [10 - 20]  Carbon Dioxide, Serum23 mmol/L [17 - 32]  Chloride, Otcwq365 mmol/L [98 - 110]  Creatinie, Serum1.0 mg/dL [0.7 - 1.5]  Glucose, Qsjbl076 mg/dL<H> [70 - 99]  Potassium, Serum4.2 mmol/L [3.5 - 5.0]  Sodium, Serum 137 mmol/L [135 - 146]  [facility]  07-19-23 @ 07:16  Blood Gas Arterial-Calcium,Ionized--  Blood Urea Nitrogen, Serum 15 mg/dL [10 - 20]  Carbon Dioxide, Serum23 mmol/L [17 - 32]  Chloride, Serum99 mmol/L [98 - 110]  Creatinie, Serum1.0 mg/dL [0.7 - 1.5]  Glucose, Sppho387 mg/dL<H> [70 - 99]  Potassium, Serum4.4 mmol/L [3.5 - 5.0]  Sodium, Serum 131 mmol/L<L> [135 - 146]  [facility]  07-18-23 @ 08:17  Blood Gas Arterial-Calcium,Ionized--  Blood Urea Nitrogen, Serum 15 mg/dL [10 - 20]  Carbon Dioxide, Serum24 mmol/L [17 - 32]  Chloride, Biosh135 mmol/L [98 - 110]  Creatinie, Serum1.0 mg/dL [0.7 - 1.5]  Glucose, Wwkpl266 mg/dL<H> [70 - 99]  Potassium, Serum4.4 mmol/L [3.5 - 5.0]  Sodium, Serum 135 mmol/L [135 - 146]  [facility]  07-17-23 @ 07:22  Blood Gas Arterial-Calcium,Ionized--  Blood Urea Nitrogen, Serum 13 mg/dL [10 - 20]  Carbon Dioxide, Serum26 mmol/L [17 - 32]  Chloride, Ibvzg639 mmol/L [98 - 110]  Creatinie, Serum1.0 mg/dL [0.7 - 1.5]  Glucose, Xovse860 mg/dL<H> [70 - 99]  Potassium, Serum4.4 mmol/L [3.5 - 5.0]  Sodium, Serum 135 mmol/L [135 - 146]        PT/INR - ( 20 Jul 2023 08:13 )   PT: 11.70 sec;   INR: 1.03 ratio         PTT - ( 20 Jul 2023 08:13 )  PTT:33.5 sec      Microbiology:    Culture - Blood (collected 07-16-23 @ 14:07)  Source: .Blood Blood-Peripheral  Preliminary Report (07-20-23 @ 23:00):    No growth at 4 days        Medications:  acetaminophen     Tablet .. 650 milliGRAM(s) Oral every 6 hours PRN  aspirin  chewable 81 milliGRAM(s) Oral daily  atorvastatin 40 milliGRAM(s) Oral at bedtime  cefTRIAXone   IVPB      cefTRIAXone   IVPB 2000 milliGRAM(s) IV Intermittent every 24 hours  dextrose 5%. 1000 milliLiter(s) IV Continuous <Continuous>  dextrose 5%. 1000 milliLiter(s) IV Continuous <Continuous>  dextrose 50% Injectable 25 Gram(s) IV Push once  dextrose 50% Injectable 12.5 Gram(s) IV Push once  dextrose 50% Injectable 25 Gram(s) IV Push once  dextrose Oral Gel 15 Gram(s) Oral once PRN  enoxaparin Injectable 40 milliGRAM(s) SubCutaneous every 24 hours  glucagon  Injectable 1 milliGRAM(s) IntraMuscular once  insulin glargine Injectable (LANTUS) 28 Unit(s) SubCutaneous at bedtime  insulin lispro (ADMELOG) corrective regimen sliding scale   SubCutaneous three times a day before meals  insulin lispro Injectable (ADMELOG) 6 Unit(s) SubCutaneous three times a day before meals  losartan 25 milliGRAM(s) Oral daily  melatonin 3 milliGRAM(s) Oral at bedtime PRN  metoclopramide 10 milliGRAM(s) Oral once  oxycodone    5 mG/acetaminophen 325 mG 1 Tablet(s) Oral every 6 hours PRN  pantoprazole    Tablet 40 milliGRAM(s) Oral before breakfast  polyethylene glycol 3350 17 Gram(s) Oral daily  senna 2 Tablet(s) Oral at bedtime  tamsulosin 0.4 milliGRAM(s) Oral at bedtime  traMADol 25 milliGRAM(s) Oral every 4 hours PRN  vancomycin  IVPB 1000 milliGRAM(s) IV Intermittent every 12 hours        Assessment and Plan:  Patient is a 67 y/o Male with a pmhx of DMII, CAD s/p CABG(2015), HTN, HLD, SHx of B/L shoulder and Lt. knee replacement presents with RT 1st digit swelling s/p I&D of paronychia on 7/12    #RT 1st digit swelling with concern for skin and soft tissue infection  - pt previously presented with a paronychia on the same finger s/p I&D on 7/12  - failed Augmentin 3 days op  - x-ray showed no fractures/dislocation  - CT hand: Cortical erosive changes and periosteal reaction at the distal aspect of the middle phalanx of the second digit. Associated soft tissue swelling. This may reflect early osteomyelitis versus erosive degenerative change.  - MRI hand was indeterminate for OM; demonstrated soft tissue infection  - Ortho declined a bone biopsy  - ESR/CRP 40/24  -post Picc line insertion by IR on 07/20/23,  ID recommended IV  vancomycin and ceftriaxone for 3 weeks and then po bactrim  - trend esr/crp weekly  - outpatient ID f/up and Hand Sx. f/up       #DM II  - Lantus 28 units and lispro 6 units tid  - Hba1c is 8.2      #CAD s/p CABG(2015)  #HTN/HLD/GERD  - c/w aspirin, losartan, statin    #BPH- c/w tamsulosin    DVT proph- Lovenox subc. tx.    #Progress Note Handoff: Pending d/c home with home care tomorrow  Family discussion: d/c plan was d/w pt. by bedside Disposition: d/c home tomorrow    Total time spent to complete patient's bedside assessment, review medical chart, discuss medical plan of care with covering medical team was more than 35 minutes with >50% of time spent face to face with patient, discussion with patient/family and/or coordination of care

## 2023-07-21 NOTE — PROGRESS NOTE ADULT - SUBJECTIVE AND OBJECTIVE BOX
Plastic Surgery    SUBJECTIVE: Pt seen and examined on rounds with team. No acute events overnight.        OBJECTIVE    PHYSICAL EXAM:   General: NAD, Lying in bed   Neuro: Awake and alert  Ext: right IF with small area of skin breakdown over dorsum of finger, localized erythema and tenderness. No fluctuance    VITALS  T(C): 37.2 (07-21-23 @ 04:35), Max: 37.2 (07-21-23 @ 04:35)  HR: 70 (07-21-23 @ 04:35) (66 - 83)  BP: 142/67 (07-21-23 @ 04:35) (122/68 - 147/84)  RR: 19 (07-21-23 @ 04:35) (19 - 19)  SpO2: 97% (07-21-23 @ 04:35) (97% - 99%)  CAPILLARY BLOOD GLUCOSE      POCT Blood Glucose.: 190 mg/dL (21 Jul 2023 00:26)  POCT Blood Glucose.: 239 mg/dL (20 Jul 2023 22:09)  POCT Blood Glucose.: 203 mg/dL (20 Jul 2023 17:13)  POCT Blood Glucose.: 237 mg/dL (20 Jul 2023 11:56)      Is/Os      MEDICATIONS (STANDING): aspirin  chewable 81 milliGRAM(s) Oral daily  atorvastatin 40 milliGRAM(s) Oral at bedtime  cefTRIAXone   IVPB      cefTRIAXone   IVPB 2000 milliGRAM(s) IV Intermittent every 24 hours  dextrose 5%. 1000 milliLiter(s) IV Continuous <Continuous>  dextrose 5%. 1000 milliLiter(s) IV Continuous <Continuous>  dextrose 50% Injectable 25 Gram(s) IV Push once  dextrose 50% Injectable 12.5 Gram(s) IV Push once  dextrose 50% Injectable 25 Gram(s) IV Push once  enoxaparin Injectable 40 milliGRAM(s) SubCutaneous every 24 hours  glucagon  Injectable 1 milliGRAM(s) IntraMuscular once  insulin glargine Injectable (LANTUS) 28 Unit(s) SubCutaneous at bedtime  insulin lispro (ADMELOG) corrective regimen sliding scale   SubCutaneous three times a day before meals  insulin lispro Injectable (ADMELOG) 6 Unit(s) SubCutaneous three times a day before meals  losartan 25 milliGRAM(s) Oral daily  metoclopramide 10 milliGRAM(s) Oral once  pantoprazole    Tablet 40 milliGRAM(s) Oral before breakfast  polyethylene glycol 3350 17 Gram(s) Oral daily  senna 2 Tablet(s) Oral at bedtime  tamsulosin 0.4 milliGRAM(s) Oral at bedtime  vancomycin  IVPB 1000 milliGRAM(s) IV Intermittent every 12 hours    MEDICATIONS (PRN):acetaminophen     Tablet .. 650 milliGRAM(s) Oral every 6 hours PRN Temp greater or equal to 38C (100.4F), Mild Pain (1 - 3)  dextrose Oral Gel 15 Gram(s) Oral once PRN Blood Glucose LESS THAN 70 milliGRAM(s)/deciliter  melatonin 3 milliGRAM(s) Oral at bedtime PRN Insomnia  oxycodone    5 mG/acetaminophen 325 mG 1 Tablet(s) Oral every 6 hours PRN Severe Pain (7 - 10)  traMADol 25 milliGRAM(s) Oral every 4 hours PRN Moderate Pain (4 - 6)      LABS  CBC (07-21 @ 06:05)                              14.3                           6.35    )----------------(  186        --    % Neutrophils, --    % Lymphocytes, ANC: --                                  43.2    CBC (07-20 @ 08:13)                              14.6                           6.71    )----------------(  174        --    % Neutrophils, --    % Lymphocytes, ANC: --                                  43.4      BMP (07-21 @ 06:05)             139     |  105     |  11    		Ca++ --      Ca 9.3                ---------------------------------( 205<H>		Mg --                 4.4     |  22      |  0.9   			Ph --      BMP (07-20 @ 08:13)             137     |  103     |  14    		Ca++ --      Ca 9.5                ---------------------------------( 209<H>		Mg --                 4.2     |  23      |  1.0   			Ph --        LFTs (07-21 @ 06:05)      TPro 6.5 / Alb 3.9 / TBili 0.3 / DBili -- / AST 19 / ALT 43<H> / AlkPhos 84  LFTs (07-20 @ 08:13)      TPro 6.6 / Alb 3.9 / TBili 0.3 / DBili -- / AST 23 / ALT 47<H> / AlkPhos 87    Coags (07-20 @ 08:13)  aPTT 33.5 / INR 1.03 / PT 11.70            IMAGING STUDIES

## 2023-07-21 NOTE — PROGRESS NOTE ADULT - ASSESSMENT
69 yo M with a pmhx of DMII, CAD s/p CABG(2015), HTN, HLD, SHx of B/L shoulder and Lt. knee replacement presents with RT 2nd digit swelling admitted for suspected cellulitis.     #RT 2nd finger swelling # cellulitis  - pt previously presented with a paronychia on the same finger s/p I&D on 7/12  - failed Augmentin 3 days op  - x-ray showed no fractures/dislocation  - CT hand: Cortical erosive changes and periosteal reaction at the distal aspect of the middle phalanx of the second digit. Associated soft tissue swelling. This may reflect early osteomyelitis versus erosive degenerative change.  - MRI hand was indeterminate for OM; demonstrated soft tissue infection  - Ortho declined a bone biopsy  - wound culture not sent (?)  - 7/20 additional pus drainage plastic surgery evaluated and was too little to send as WCx  - ESR/CRP 40/24  - ID and Ortho recs appreciated  - s/p PICC insertion  - plan for  vancomycin and ceftriaxone for 3 weeks and then po bactrim 3 weeks  - continue on Vancomycin and ceftriaxone   - tylenol prn fever  - percocet prn pain 7-10  - tramadol prn pain 4-6 for breakthrough  - ppi   - miralax for bowel regimen  - trend esr/crp weekly  - f/u vanc trough on 7/24 am  - if Cr > 1.3 vanc trough sooner  - warm soapy soaks TID for 15-20 minutes  -MRSA nares negative     #DM II  - Lantus 28 units and lispro 6 units tid  - ISS  - Hba1c is 8.2  - lipid profile shows elevated ; unclear if truly done while fasting    #CAD s/p CABG(2015)  #HTN/HLD/GERD  - c/w aspirin, losartan, statin    #BPH  - c/w tamsulosin    #Misc  -DVT prophylaxis: Lovenox  -GI prophylaxis: PPI  -Diet: DASH/CC  -Code status: Full  -Activity: AAT    Handoff:   - follow up FS and adjust insulin as needed  - 3 weeks vancomycin and ceftriaxone (total) for 3 weeks and then po bactrim  - warm soapy soaks TID for 15-20 minutes  - op f/u with ID and ortho  - monitor for BMs while on opioids  - f/u vanc trough on 7/24 am  - if Cr > 1.3 vanc trough sooner  - plan for dc 7/23 once infusion company comes with abx

## 2023-07-22 VITALS
HEART RATE: 65 BPM | OXYGEN SATURATION: 98 % | TEMPERATURE: 97 F | DIASTOLIC BLOOD PRESSURE: 60 MMHG | RESPIRATION RATE: 18 BRPM | SYSTOLIC BLOOD PRESSURE: 128 MMHG

## 2023-07-22 LAB
ALBUMIN SERPL ELPH-MCNC: 4.2 G/DL — SIGNIFICANT CHANGE UP (ref 3.5–5.2)
ALP SERPL-CCNC: 95 U/L — SIGNIFICANT CHANGE UP (ref 30–115)
ALT FLD-CCNC: 47 U/L — HIGH (ref 0–41)
ANION GAP SERPL CALC-SCNC: 11 MMOL/L — SIGNIFICANT CHANGE UP (ref 7–14)
AST SERPL-CCNC: 21 U/L — SIGNIFICANT CHANGE UP (ref 0–41)
BILIRUB SERPL-MCNC: 0.4 MG/DL — SIGNIFICANT CHANGE UP (ref 0.2–1.2)
BUN SERPL-MCNC: 13 MG/DL — SIGNIFICANT CHANGE UP (ref 10–20)
CALCIUM SERPL-MCNC: 9.1 MG/DL — SIGNIFICANT CHANGE UP (ref 8.4–10.5)
CHLORIDE SERPL-SCNC: 102 MMOL/L — SIGNIFICANT CHANGE UP (ref 98–110)
CO2 SERPL-SCNC: 25 MMOL/L — SIGNIFICANT CHANGE UP (ref 17–32)
CREAT SERPL-MCNC: 1.1 MG/DL — SIGNIFICANT CHANGE UP (ref 0.7–1.5)
CRP SERPL-MCNC: 3.9 MG/L — SIGNIFICANT CHANGE UP
EGFR: 74 ML/MIN/1.73M2 — SIGNIFICANT CHANGE UP
ERYTHROCYTE [SEDIMENTATION RATE] IN BLOOD: 20 MM/HR — HIGH (ref 0–10)
GLUCOSE BLDC GLUCOMTR-MCNC: 184 MG/DL — HIGH (ref 70–99)
GLUCOSE BLDC GLUCOMTR-MCNC: 191 MG/DL — HIGH (ref 70–99)
GLUCOSE BLDC GLUCOMTR-MCNC: 202 MG/DL — HIGH (ref 70–99)
GLUCOSE BLDC GLUCOMTR-MCNC: 220 MG/DL — HIGH (ref 70–99)
GLUCOSE BLDC GLUCOMTR-MCNC: 229 MG/DL — HIGH (ref 70–99)
GLUCOSE BLDC GLUCOMTR-MCNC: 241 MG/DL — HIGH (ref 70–99)
GLUCOSE BLDC GLUCOMTR-MCNC: 275 MG/DL — HIGH (ref 70–99)
GLUCOSE SERPL-MCNC: 226 MG/DL — HIGH (ref 70–99)
HCT VFR BLD CALC: 44.1 % — SIGNIFICANT CHANGE UP (ref 42–52)
HGB BLD-MCNC: 14.6 G/DL — SIGNIFICANT CHANGE UP (ref 14–18)
MCHC RBC-ENTMCNC: 28.9 PG — SIGNIFICANT CHANGE UP (ref 27–31)
MCHC RBC-ENTMCNC: 33.1 G/DL — SIGNIFICANT CHANGE UP (ref 32–37)
MCV RBC AUTO: 87.2 FL — SIGNIFICANT CHANGE UP (ref 80–94)
NRBC # BLD: 0 /100 WBCS — SIGNIFICANT CHANGE UP (ref 0–0)
PLATELET # BLD AUTO: 198 K/UL — SIGNIFICANT CHANGE UP (ref 130–400)
PMV BLD: 11.6 FL — HIGH (ref 7.4–10.4)
POTASSIUM SERPL-MCNC: 4.1 MMOL/L — SIGNIFICANT CHANGE UP (ref 3.5–5)
POTASSIUM SERPL-SCNC: 4.1 MMOL/L — SIGNIFICANT CHANGE UP (ref 3.5–5)
PROT SERPL-MCNC: 6.8 G/DL — SIGNIFICANT CHANGE UP (ref 6–8)
RBC # BLD: 5.06 M/UL — SIGNIFICANT CHANGE UP (ref 4.7–6.1)
RBC # FLD: 14 % — SIGNIFICANT CHANGE UP (ref 11.5–14.5)
SODIUM SERPL-SCNC: 138 MMOL/L — SIGNIFICANT CHANGE UP (ref 135–146)
WBC # BLD: 6.64 K/UL — SIGNIFICANT CHANGE UP (ref 4.8–10.8)
WBC # FLD AUTO: 6.64 K/UL — SIGNIFICANT CHANGE UP (ref 4.8–10.8)

## 2023-07-22 PROCEDURE — 99239 HOSP IP/OBS DSCHRG MGMT >30: CPT

## 2023-07-22 RX ORDER — CEFTRIAXONE 500 MG/1
2 INJECTION, POWDER, FOR SOLUTION INTRAMUSCULAR; INTRAVENOUS
Qty: 0 | Refills: 0 | DISCHARGE
Start: 2023-07-22

## 2023-07-22 RX ORDER — VANCOMYCIN HCL 1 G
1 VIAL (EA) INTRAVENOUS
Qty: 0 | Refills: 0 | DISCHARGE
Start: 2023-07-22

## 2023-07-22 RX ADMIN — TAMSULOSIN HYDROCHLORIDE 0.4 MILLIGRAM(S): 0.4 CAPSULE ORAL at 20:37

## 2023-07-22 RX ADMIN — CEFTRIAXONE 100 MILLIGRAM(S): 500 INJECTION, POWDER, FOR SOLUTION INTRAMUSCULAR; INTRAVENOUS at 14:43

## 2023-07-22 RX ADMIN — LOSARTAN POTASSIUM 25 MILLIGRAM(S): 100 TABLET, FILM COATED ORAL at 06:06

## 2023-07-22 RX ADMIN — Medication 6 UNIT(S): at 17:33

## 2023-07-22 RX ADMIN — Medication 3: at 11:58

## 2023-07-22 RX ADMIN — Medication 250 MILLIGRAM(S): at 11:18

## 2023-07-22 RX ADMIN — POLYETHYLENE GLYCOL 3350 17 GRAM(S): 17 POWDER, FOR SOLUTION ORAL at 11:18

## 2023-07-22 RX ADMIN — Medication 1: at 08:05

## 2023-07-22 RX ADMIN — Medication 6 UNIT(S): at 08:05

## 2023-07-22 RX ADMIN — Medication 6 UNIT(S): at 11:58

## 2023-07-22 RX ADMIN — PANTOPRAZOLE SODIUM 40 MILLIGRAM(S): 20 TABLET, DELAYED RELEASE ORAL at 06:06

## 2023-07-22 RX ADMIN — INSULIN GLARGINE 28 UNIT(S): 100 INJECTION, SOLUTION SUBCUTANEOUS at 20:36

## 2023-07-22 RX ADMIN — Medication 2: at 17:33

## 2023-07-22 RX ADMIN — Medication 81 MILLIGRAM(S): at 11:18

## 2023-07-22 RX ADMIN — ATORVASTATIN CALCIUM 40 MILLIGRAM(S): 80 TABLET, FILM COATED ORAL at 20:37

## 2023-07-22 RX ADMIN — ENOXAPARIN SODIUM 40 MILLIGRAM(S): 100 INJECTION SUBCUTANEOUS at 12:02

## 2023-07-22 RX ADMIN — Medication 250 MILLIGRAM(S): at 20:37

## 2023-07-22 NOTE — PROGRESS NOTE ADULT - SUBJECTIVE AND OBJECTIVE BOX
JAVI JUDD  [facility] 3[facility] (Back) 024 B ([facility] 3A (Back))      Patient was evaluated and examined  by bedside, no active complains         REVIEW OF SYSTEMS:  please see pertinent positives mentioned above, all other 12 ROS negative      T(C): , Max: 36.8 (07-22-23 @ 04:30)  HR: 62 (07-22-23 @ 04:30)  BP: 145/69 (07-22-23 @ 04:30)  RR: 19 (07-22-23 @ 04:30)  SpO2: 99% (07-22-23 @ 04:30)  CAPILLARY BLOOD GLUCOSE      POCT Blood Glucose.: 275 mg/dL (22 Jul 2023 11:53)  POCT Blood Glucose.: 184 mg/dL (22 Jul 2023 07:47)  POCT Blood Glucose.: 191 mg/dL (22 Jul 2023 06:03)  POCT Blood Glucose.: 220 mg/dL (22 Jul 2023 03:30)  POCT Blood Glucose.: 313 mg/dL (21 Jul 2023 23:41)  POCT Blood Glucose.: 391 mg/dL (21 Jul 2023 21:33)  POCT Blood Glucose.: 220 mg/dL (21 Jul 2023 17:27)      PHYSICAL EXAM:  General: NAD, AAOX3, patient is laying comfortably in bed  HEENT: AT, NC, Supple, NO JVD, NO CB  Lungs: CTA B/L, no wheezing, no rhonchi  CVS: normal S1, S2, RRR, NO M/G/R  Abdomen: soft, bowel sounds present, non-tender, non-distended  Extremities: no edema, no clubbing, no cyanosis, positive peripheral pulses b/l  Neuro: no acute focal neurological deficits  Skin: right index finger mild superficial edema just below the nail bed , covered with dressing          LAB  CBC  Date: 07-22-23 @ 08:56  Mean cell Gigarcdqlr76.9  Mean cell Hemoglobin Conc33.1  Mean cell Volum 87.2  Platelet count-Automate 198  RBC Count 5.06  Red Cell Distrib Width14.0  WBC Count6.64  % Albumin, Urine--  Hematocrit 44.1  Hemoglobin 14.6  CBC  Date: 07-21-23 @ 06:05  Mean cell Tdlodumnwj90.7  Mean cell Hemoglobin Conc33.1  Mean cell Volum 86.7  Platelet count-Automate 186  RBC Count 4.98  Red Cell Distrib Width14.0  WBC Count6.35  % Albumin, Urine--  Hematocrit 43.2  Hemoglobin 14.3  CBC  Date: 07-20-23 @ 08:13  Mean cell Rzpjsdiojw90.3  Mean cell Hemoglobin Conc33.6  Mean cell Volum 87.0  Platelet count-Automate 174  RBC Count 4.99  Red Cell Distrib Width13.9  WBC Count6.71  % Albumin, Urine--  Hematocrit 43.4  Hemoglobin 14.6  CBC  Date: 07-19-23 @ 07:16  Mean cell Jjprdfizjz28.6  Mean cell Hemoglobin Conc32.7  Mean cell Volum 87.5  Platelet count-Automate 174  RBC Count 5.10  Red Cell Distrib Width13.9  WBC Count6.16  % Albumin, Urine--  Hematocrit 44.6  Hemoglobin 14.6  CBC  Date: 07-18-23 @ 08:17  Mean cell Wvuwoxfixo65.4  Mean cell Hemoglobin Conc33.5  Mean cell Volum 87.8  Platelet count-Automate 176  RBC Count 5.00  Red Cell Distrib Width14.0  WBC Count5.63  % Albumin, Urine--  Hematocrit 43.9  Hemoglobin 14.7  CBC  Date: 07-17-23 @ 07:22  Mean cell Qhtirfhvmy97.7  Mean cell Hemoglobin Conc32.7  Mean cell Volum 87.9  Platelet count-Automate 154  RBC Count 4.63  Red Cell Distrib Width14.0  WBC Count5.45  % Albumin, Urine--  Hematocrit 40.7  Hemoglobin 13.3  CBC  Date: 07-16-23 @ 06:21  Mean cell Vycbddcdst26.5  Mean cell Hemoglobin Conc32.3  Mean cell Volum 88.2  Platelet count-Automate 161  RBC Count 4.67  Red Cell Distrib Width14.4  WBC Count6.40  % Albumin, Urine--  Hematocrit 41.2  Hemoglobin 13.3    [facility]  07-22-23 @ 08:56  Blood Gas Arterial-Calcium,Ionized--  Blood Urea Nitrogen, Serum 13 mg/dL [10 - 20]  Carbon Dioxide, Serum25 mmol/L [17 - 32]  Chloride, Ullja589 mmol/L [98 - 110]  Creatinie, Serum1.1 mg/dL [0.7 - 1.5]  Glucose, Dpfwx225 mg/dL<H> [70 - 99]  Potassium, Serum4.1 mmol/L [3.5 - 5.0]  Sodium, Serum 138 mmol/L [135 - 146]  [facility]  07-21-23 @ 06:05  Blood Gas Arterial-Calcium,Ionized--  Blood Urea Nitrogen, Serum 11 mg/dL [10 - 20]  Carbon Dioxide, Serum22 mmol/L [17 - 32]  Chloride, Vzajg870 mmol/L [98 - 110]  Creatinie, Serum0.9 mg/dL [0.7 - 1.5]  Glucose, Rhljx388 mg/dL<H> [70 - 99]  Potassium, Serum4.4 mmol/L [3.5 - 5.0]  Sodium, Serum 139 mmol/L [135 - 146]  [facility]  07-20-23 @ 08:13  Blood Gas Arterial-Calcium,Ionized--  Blood Urea Nitrogen, Serum 14 mg/dL [10 - 20]  Carbon Dioxide, Serum23 mmol/L [17 - 32]  Chloride, Ahxum878 mmol/L [98 - 110]  Creatinie, Serum1.0 mg/dL [0.7 - 1.5]  Glucose, Cekig722 mg/dL<H> [70 - 99]  Potassium, Serum4.2 mmol/L [3.5 - 5.0]  Sodium, Serum 137 mmol/L [135 - 146]  [facility]  07-19-23 @ 07:16  Blood Gas Arterial-Calcium,Ionized--  Blood Urea Nitrogen, Serum 15 mg/dL [10 - 20]  Carbon Dioxide, Serum23 mmol/L [17 - 32]  Chloride, Serum99 mmol/L [98 - 110]  Creatinie, Serum1.0 mg/dL [0.7 - 1.5]  Glucose, Kflyp882 mg/dL<H> [70 - 99]  Potassium, Serum4.4 mmol/L [3.5 - 5.0]  Sodium, Serum 131 mmol/L<L> [135 - 146]  [facility]  07-18-23 @ 08:17  Blood Gas Arterial-Calcium,Ionized--  Blood Urea Nitrogen, Serum 15 mg/dL [10 - 20]  Carbon Dioxide, Serum24 mmol/L [17 - 32]  Chloride, Pkhvz273 mmol/L [98 - 110]  Creatinie, Serum1.0 mg/dL [0.7 - 1.5]  Glucose, Oolwr611 mg/dL<H> [70 - 99]  Potassium, Serum4.4 mmol/L [3.5 - 5.0]  Sodium, Serum 135 mmol/L [135 - 146]      Microbiology:    Culture - Blood (collected 07-16-23 @ 14:07)  Source: .Blood Blood-Peripheral  Final Report (07-21-23 @ 23:01):    No growth at 5 days      Medications:  acetaminophen     Tablet .. 650 milliGRAM(s) Oral every 6 hours PRN  aspirin  chewable 81 milliGRAM(s) Oral daily  atorvastatin 40 milliGRAM(s) Oral at bedtime  cefTRIAXone   IVPB      cefTRIAXone   IVPB 2000 milliGRAM(s) IV Intermittent every 24 hours  dextrose 5%. 1000 milliLiter(s) IV Continuous <Continuous>  dextrose 5%. 1000 milliLiter(s) IV Continuous <Continuous>  dextrose 50% Injectable 25 Gram(s) IV Push once  dextrose 50% Injectable 12.5 Gram(s) IV Push once  dextrose 50% Injectable 25 Gram(s) IV Push once  dextrose Oral Gel 15 Gram(s) Oral once PRN  enoxaparin Injectable 40 milliGRAM(s) SubCutaneous every 24 hours  glucagon  Injectable 1 milliGRAM(s) IntraMuscular once  insulin glargine Injectable (LANTUS) 28 Unit(s) SubCutaneous at bedtime  insulin lispro (ADMELOG) corrective regimen sliding scale   SubCutaneous three times a day before meals  insulin lispro Injectable (ADMELOG) 6 Unit(s) SubCutaneous three times a day before meals  losartan 25 milliGRAM(s) Oral daily  melatonin 3 milliGRAM(s) Oral at bedtime PRN  metoclopramide 10 milliGRAM(s) Oral once  oxycodone    5 mG/acetaminophen 325 mG 1 Tablet(s) Oral every 6 hours PRN  pantoprazole    Tablet 40 milliGRAM(s) Oral before breakfast  polyethylene glycol 3350 17 Gram(s) Oral daily  senna 2 Tablet(s) Oral at bedtime  tamsulosin 0.4 milliGRAM(s) Oral at bedtime  traMADol 25 milliGRAM(s) Oral every 4 hours PRN  vancomycin  IVPB 1000 milliGRAM(s) IV Intermittent every 12 hours        Assessment and Plan:  Patient is a 69 y/o Male with a pmhx of DMII, CAD s/p CABG(2015), HTN, HLD, SHx of B/L shoulder and Lt. knee replacement presents with RT 1st digit swelling s/p I&D of paronychia on 7/12    #RT 1st digit swelling with concern for skin and soft tissue infection  - pt previously presented with a paronychia on the same finger s/p I&D on 7/12  - failed Augmentin 3 days op  - x-ray showed no fractures/dislocation  - CT hand: Cortical erosive changes and periosteal reaction at the distal aspect of the middle phalanx of the second digit. Associated soft tissue swelling. This may reflect early osteomyelitis versus erosive degenerative change.  - MRI hand was indeterminate for OM; demonstrated soft tissue infection  - Ortho declined a bone biopsy  - ESR/CRP 40/24  -post Picc line insertion by IR on 07/20/23,  ID recommended IV  vancomycin and ceftriaxone for 3 weeks and then po bactrim  - trend esr/crp weekly  - outpatient ID f/up and Hand Sx. f/up       #DM II  - Lantus 28 units and lispro 6 units tid  - Hba1c is 8.2      #CAD s/p CABG(2015)  #HTN/HLD/GERD  - c/w aspirin, losartan, statin    #BPH- c/w tamsulosin    DVT proph- Lovenox subc. tx.    #Progress Note Handoff: Pending d/c home with home care today post evening dose of IV vanco.   Family discussion: d/c plan was d/w pt. by bedside Disposition: d/c home today     Total time spent to complete patient's bedside assessment, review medical chart, discuss discharge  plan of care with covering medical team was more than 35 minutes with >50% of time spent face to face with patient, discussion with patient/family and/or coordination of care

## 2023-07-22 NOTE — PROGRESS NOTE ADULT - REASON FOR ADMISSION
right index finger infection
R IF PARONYCHIA
RT 1st finger swelling
finger infection
finger infection
tissue infetion
index finger infection
local skin infection

## 2023-07-22 NOTE — PROGRESS NOTE ADULT - PROVIDER SPECIALTY LIST ADULT
Hospitalist
Hospitalist
Plastic Surgery
Hospitalist
Infectious Disease
Internal Medicine
Orthopedics
Hospitalist
Infectious Disease
Infectious Disease
Internal Medicine
Orthopedics
Plastic Surgery
Internal Medicine
Infectious Disease

## 2023-07-29 DIAGNOSIS — Z79.899 OTHER LONG TERM (CURRENT) DRUG THERAPY: ICD-10-CM

## 2023-07-29 DIAGNOSIS — N18.31 CHRONIC KIDNEY DISEASE, STAGE 3A: ICD-10-CM

## 2023-07-29 DIAGNOSIS — Z79.4 LONG TERM (CURRENT) USE OF INSULIN: ICD-10-CM

## 2023-07-29 DIAGNOSIS — Z96.652 PRESENCE OF LEFT ARTIFICIAL KNEE JOINT: ICD-10-CM

## 2023-07-29 DIAGNOSIS — E11.22 TYPE 2 DIABETES MELLITUS WITH DIABETIC CHRONIC KIDNEY DISEASE: ICD-10-CM

## 2023-07-29 DIAGNOSIS — Z79.82 LONG TERM (CURRENT) USE OF ASPIRIN: ICD-10-CM

## 2023-07-29 DIAGNOSIS — I25.10 ATHEROSCLEROTIC HEART DISEASE OF NATIVE CORONARY ARTERY WITHOUT ANGINA PECTORIS: ICD-10-CM

## 2023-07-29 DIAGNOSIS — N40.0 BENIGN PROSTATIC HYPERPLASIA WITHOUT LOWER URINARY TRACT SYMPTOMS: ICD-10-CM

## 2023-07-29 DIAGNOSIS — E78.5 HYPERLIPIDEMIA, UNSPECIFIED: ICD-10-CM

## 2023-07-29 DIAGNOSIS — Z96.611 PRESENCE OF RIGHT ARTIFICIAL SHOULDER JOINT: ICD-10-CM

## 2023-07-29 DIAGNOSIS — Z87.891 PERSONAL HISTORY OF NICOTINE DEPENDENCE: ICD-10-CM

## 2023-07-29 DIAGNOSIS — Z96.612 PRESENCE OF LEFT ARTIFICIAL SHOULDER JOINT: ICD-10-CM

## 2023-07-29 DIAGNOSIS — L03.011 CELLULITIS OF RIGHT FINGER: ICD-10-CM

## 2023-07-29 DIAGNOSIS — I12.9 HYPERTENSIVE CHRONIC KIDNEY DISEASE WITH STAGE 1 THROUGH STAGE 4 CHRONIC KIDNEY DISEASE, OR UNSPECIFIED CHRONIC KIDNEY DISEASE: ICD-10-CM

## 2023-07-29 DIAGNOSIS — K21.9 GASTRO-ESOPHAGEAL REFLUX DISEASE WITHOUT ESOPHAGITIS: ICD-10-CM

## 2023-07-29 DIAGNOSIS — N17.9 ACUTE KIDNEY FAILURE, UNSPECIFIED: ICD-10-CM

## 2023-07-29 DIAGNOSIS — Z95.1 PRESENCE OF AORTOCORONARY BYPASS GRAFT: ICD-10-CM

## 2023-09-04 ENCOUNTER — RX RENEWAL (OUTPATIENT)
Age: 68
End: 2023-09-04

## 2023-09-27 NOTE — ED ADULT NURSE NOTE - NSFALLRSKASSESSTYPE_ED_ALL_ED
Pt. with hyperkalemia in the setting of ESRD and missed HD treatments. Serum potassium improved to 5.1 today. Monitor serum potassium. Pt. with hyperkalemia in the setting of ESRD and missed HD treatments. Serum potassium improved to 5.2 today. Monitor serum potassium. Initial (On Arrival)

## 2023-10-17 ENCOUNTER — EMERGENCY (EMERGENCY)
Facility: HOSPITAL | Age: 68
LOS: 0 days | Discharge: HOME | End: 2023-10-17
Attending: EMERGENCY MEDICINE
Payer: MEDICARE

## 2023-10-17 VITALS
HEIGHT: 68 IN | TEMPERATURE: 98 F | HEART RATE: 92 BPM | SYSTOLIC BLOOD PRESSURE: 120 MMHG | RESPIRATION RATE: 16 BRPM | DIASTOLIC BLOOD PRESSURE: 62 MMHG | OXYGEN SATURATION: 99 % | WEIGHT: 184.97 LBS

## 2023-10-17 DIAGNOSIS — E78.5 HYPERLIPIDEMIA, UNSPECIFIED: ICD-10-CM

## 2023-10-17 DIAGNOSIS — Z79.84 LONG TERM (CURRENT) USE OF ORAL HYPOGLYCEMIC DRUGS: ICD-10-CM

## 2023-10-17 DIAGNOSIS — I10 ESSENTIAL (PRIMARY) HYPERTENSION: ICD-10-CM

## 2023-10-17 DIAGNOSIS — Z87.891 PERSONAL HISTORY OF NICOTINE DEPENDENCE: ICD-10-CM

## 2023-10-17 DIAGNOSIS — E11.9 TYPE 2 DIABETES MELLITUS WITHOUT COMPLICATIONS: ICD-10-CM

## 2023-10-17 DIAGNOSIS — Z79.82 LONG TERM (CURRENT) USE OF ASPIRIN: ICD-10-CM

## 2023-10-17 DIAGNOSIS — M77.32 CALCANEAL SPUR, LEFT FOOT: ICD-10-CM

## 2023-10-17 DIAGNOSIS — M79.672 PAIN IN LEFT FOOT: ICD-10-CM

## 2023-10-17 DIAGNOSIS — Z98.890 OTHER SPECIFIED POSTPROCEDURAL STATES: Chronic | ICD-10-CM

## 2023-10-17 DIAGNOSIS — I25.10 ATHEROSCLEROTIC HEART DISEASE OF NATIVE CORONARY ARTERY WITHOUT ANGINA PECTORIS: ICD-10-CM

## 2023-10-17 DIAGNOSIS — Z95.1 PRESENCE OF AORTOCORONARY BYPASS GRAFT: ICD-10-CM

## 2023-10-17 PROCEDURE — 73630 X-RAY EXAM OF FOOT: CPT | Mod: 26,LT

## 2023-10-17 PROCEDURE — 99283 EMERGENCY DEPT VISIT LOW MDM: CPT | Mod: 25

## 2023-10-17 PROCEDURE — 73630 X-RAY EXAM OF FOOT: CPT | Mod: LT

## 2023-10-17 PROCEDURE — 99284 EMERGENCY DEPT VISIT MOD MDM: CPT

## 2023-10-17 RX ORDER — ACETAMINOPHEN 500 MG
975 TABLET ORAL ONCE
Refills: 0 | Status: COMPLETED | OUTPATIENT
Start: 2023-10-17 | End: 2023-10-17

## 2023-10-17 RX ADMIN — Medication 975 MILLIGRAM(S): at 22:08

## 2023-10-17 NOTE — ED ADULT NURSE NOTE - CHPI ED NUR SYMPTOMS POS
PT: Per discussion with RN cancel PT this am, RN reports pt anxious right now and not appropriate for therapy. PT will attempt back as schedule allows.   PAIN

## 2023-10-17 NOTE — ED PROVIDER NOTE - NSPTACCESSSVCSAPPTDETAILS_ED_ALL_ED_FT
Pt with L heel pain x 10 days, found to have heel spur on xray, needs podiatry for further management.

## 2023-10-17 NOTE — ED PROVIDER NOTE - NSFOLLOWUPINSTRUCTIONS_ED_ALL_ED_FT
Our Emergency Department Referral Coordinators will be reaching out to you in the next 24-48 hours from 9:00am to 5:00pm with a follow up appointment. Please expect a phone call from the hospital in that time frame. If you do not receive a call or if you have any questions or concerns, you can reach them at   [phone]    Heel Spur  Image   A heel spur is a bony growth that forms on the bottom of the heel bone (calcaneus). Heel spurs are common. They often cause inflammation in the band of tissue that connects the toes to the heel bone (plantar fascia). This may cause pain on the bottom of the foot, near the heel. Many people with plantar fasciitis also have heel spurs. However, spurs are not the cause of plantar fasciitis pain.    What are the causes?  The exact cause of heel spurs is not known. They may be caused by:  Pressure on the heel bone.   Bands of tissue (tendons) pulling on the heel bone.  What increases the risk?  You are more likely to develop this condition if you:  Are older than 40.  Are overweight.  Have wear-and-tear arthritis (osteoarthritis).  Have plantar fascia inflammation.  Participate in sports or activities that include a lot of running or jumping.  Wear poorly fitted shoes.  What are the signs or symptoms?  Some people have no symptoms. If you do have symptoms, they may include:  Pain in the bottom of your heel.  Pain that is worse when you first get out of bed.  Pain that gets worse after walking or standing.  How is this diagnosed?  This condition may be diagnosed based on:  Your symptoms and medical history.  A physical exam.  A foot X-ray.  How is this treated?  Treatment for this condition depends on how much pain you have. Treatment options may include:  Doing stretching exercises.  Losing weight, if necessary.  Wearing specific shoes or inserts inside of shoes (orthotics) for comfort and support.  Wearing splints on your feet while you sleep. Splints keep your feet in a position (usually 90 degrees) that should prevent and relieve the pain you feel when you first get out of bed. They also make stretching easier in the morning.  Taking over-the-counter medicine to relieve pain, such as NSAIDs.  Using high-intensity sound waves to break up the heel spur (extracorporeal shock wave therapy).  Getting steroid injections in your heel to reduce inflammation.  Having surgery, if your heel spur causes long-term (chronic) pain.  Follow these instructions at home:  Image   Activity     Avoid activities that cause pain until you recover, or for as long as directed by your health care provider.  Do stretching exercises as directed. Stretch before exercising or being physically active.  Managing pain, stiffness, and swelling     If directed, put ice on your foot:   Put ice in a plastic bag.  Place a towel between your skin and the bag.   Leave the ice on for 20 minutes, 2–3 times a day.  Move your toes often to avoid stiffness and to lessen swelling.   When possible, raise (elevate) your foot above the level of your heart while you are sitting or lying down.  General instructions     Take over-the-counter and prescription medicines only as told by your health care provider.  Wear supportive shoes that fit well. Wear splints, inserts, or orthotics as told by your health care provider.  If recommended, work with your health care provider to lose weight. This can relieve pressure on your foot.  Do not use any products that contain nicotine or tobacco, such as cigarettes and e-cigarettes. These can affect bone growth and healing. If you need help quitting, ask your health care provider.  Keep all follow-up visits as told by your health care provider. This is important.  Contact a health care provider if:  Your pain does not go away with treatment.  Your pain gets worse.  Summary  A heel spur is a bony growth that forms on the bottom of the heel bone (calcaneus).  Heel spurs often cause inflammation in the band of tissue that connects the toes to the heel bone (plantar fascia). This may cause pain on the bottom of the foot, near the heel.  Doing stretching exercises, losing weight, wearing specific shoes or shoe inserts, wearing splints while you sleep, and taking pain medicine may ease the pain and stiffness.  Other treatment options may include high-intensity sound waves to break up the heel spur, steroid injections, or surgery.  This information is not intended to replace advice given to you by your health care provider. Make sure you discuss any questions you have with your health care provider.

## 2023-10-17 NOTE — ED PROVIDER NOTE - PATIENT PORTAL LINK FT
You can access the FollowMyHealth Patient Portal offered by [facility] by registering at the following website: [email or web address]. By joining [facility]’s FollowMyHealth portal, you will also be able to view your health information using other applications (apps) compatible with our system.

## 2023-10-17 NOTE — ED PROVIDER NOTE - PHYSICAL EXAMINATION
VITAL SIGNS: I have reviewed nursing notes and confirm.  CONSTITUTIONAL: Well-developed; well-nourished; in no acute distress.  SKIN: Skin exam is warm and dry, no acute rash.  HEAD: Normocephalic; atraumatic.  EYES: Conjunctiva and sclera clear.  CARD: Regular rate and rhythm.  RESP: Speaking in full sentences.   EXT: Normal ROM. (+) mild TTP to L heel, without swelling, deformity, erythema or warmth. DP 2+.   NEURO: Alert, oriented. Grossly unremarkable. No focal deficits.

## 2023-10-17 NOTE — ED PROVIDER NOTE - CARE PROVIDER_API CALL
Yuri Cruz  Podiatric Medicine and Surgery  [address], [address]  [place] [address]  Phone: [phone]  Fax: [phone]  Follow Up Time: 4-6 Days

## 2023-10-17 NOTE — ED PROVIDER NOTE - OBJECTIVE STATEMENT
69 yo M with PMHx of HTN, DM, CAD s/p CABG, and HLD presents to the ED c/o persisting L heel pain x 10 pains. Pain is sharp, constant, worse when walking and slightly improved with rest. He denies any injury to the area. He denies other complaints. Pt denies fever, chills, weakness, chest pain, SOB, back pain, LOC, calf pain/swelling, recent travel, recent surgery.

## 2023-10-17 NOTE — ED ADULT TRIAGE NOTE - IDEAL BODY WEIGHT(KG)
Pt called to get an appt for dizziness. Pt stated that her head started to feel weird and then she experienced some dizziness and has had 3 dizzy spells since then. I informed pt that there were not any openings today at our office and that she should go to the ER per Dr. Pa Hannon. Pt upset and stated that she can never get an appt with a doctor when she calls and hung up. 69

## 2023-10-24 ENCOUNTER — APPOINTMENT (OUTPATIENT)
Dept: PODIATRY | Facility: CLINIC | Age: 68
End: 2023-10-24
Payer: MEDICARE

## 2023-10-24 VITALS — BODY MASS INDEX: 28.25 KG/M2 | HEIGHT: 67 IN | WEIGHT: 180 LBS

## 2023-10-24 DIAGNOSIS — I73.9 PERIPHERAL VASCULAR DISEASE, UNSPECIFIED: ICD-10-CM

## 2023-10-24 PROCEDURE — 20550 NJX 1 TENDON SHEATH/LIGAMENT: CPT | Mod: LT

## 2023-10-24 PROCEDURE — 99203 OFFICE O/P NEW LOW 30 MIN: CPT | Mod: 25

## 2023-11-06 ENCOUNTER — APPOINTMENT (OUTPATIENT)
Dept: PAIN MANAGEMENT | Facility: CLINIC | Age: 68
End: 2023-11-06
Payer: MEDICARE

## 2023-11-06 VITALS
SYSTOLIC BLOOD PRESSURE: 132 MMHG | HEART RATE: 98 BPM | HEIGHT: 67 IN | DIASTOLIC BLOOD PRESSURE: 82 MMHG | BODY MASS INDEX: 28.25 KG/M2 | WEIGHT: 180 LBS

## 2023-11-06 DIAGNOSIS — K62.89 OTHER SPECIFIED DISEASES OF ANUS AND RECTUM: ICD-10-CM

## 2023-11-06 PROCEDURE — 99214 OFFICE O/P EST MOD 30 MIN: CPT

## 2023-11-21 ENCOUNTER — APPOINTMENT (OUTPATIENT)
Dept: PAIN MANAGEMENT | Facility: CLINIC | Age: 68
End: 2023-11-21

## 2023-12-01 ENCOUNTER — APPOINTMENT (OUTPATIENT)
Dept: CARDIOLOGY | Facility: CLINIC | Age: 68
End: 2023-12-01
Payer: MEDICARE

## 2023-12-01 VITALS
DIASTOLIC BLOOD PRESSURE: 80 MMHG | HEART RATE: 88 BPM | WEIGHT: 202 LBS | BODY MASS INDEX: 31.71 KG/M2 | HEIGHT: 67 IN | SYSTOLIC BLOOD PRESSURE: 124 MMHG

## 2023-12-01 DIAGNOSIS — I10 ESSENTIAL (PRIMARY) HYPERTENSION: ICD-10-CM

## 2023-12-01 DIAGNOSIS — E11.9 TYPE 2 DIABETES MELLITUS W/OUT COMPLICATIONS: ICD-10-CM

## 2023-12-01 DIAGNOSIS — I25.810 ATHEROSCLEROSIS OF CORONARY ARTERY BYPASS GRAFT(S) W/OUT ANGINA PECTORIS: ICD-10-CM

## 2023-12-01 DIAGNOSIS — E78.00 PURE HYPERCHOLESTEROLEMIA, UNSPECIFIED: ICD-10-CM

## 2023-12-01 PROCEDURE — 93925 LOWER EXTREMITY STUDY: CPT

## 2023-12-01 PROCEDURE — 93923 UPR/LXTR ART STDY 3+ LVLS: CPT

## 2023-12-01 PROCEDURE — 99204 OFFICE O/P NEW MOD 45 MIN: CPT

## 2023-12-01 RX ORDER — ASPIRIN 81 MG/1
81 TABLET ORAL
Refills: 0 | Status: ACTIVE | COMMUNITY

## 2023-12-01 RX ORDER — TIZANIDINE 4 MG/1
4 TABLET ORAL
Qty: 30 | Refills: 1 | Status: DISCONTINUED | COMMUNITY
Start: 2023-04-25 | End: 2023-12-01

## 2023-12-01 RX ORDER — ASPIRIN 81 MG
81 TABLET,CHEWABLE ORAL
Refills: 0 | Status: DISCONTINUED | COMMUNITY
End: 2023-12-01

## 2023-12-01 RX ORDER — PRAVASTATIN SODIUM 20 MG/1
20 TABLET ORAL DAILY
Refills: 0 | Status: ACTIVE | COMMUNITY

## 2023-12-01 RX ORDER — FAMOTIDINE 40 MG/1
40 TABLET, FILM COATED ORAL
Refills: 0 | Status: DISCONTINUED | COMMUNITY
End: 2023-12-01

## 2023-12-01 RX ORDER — METFORMIN HYDROCHLORIDE 1000 MG/1
1000 TABLET, COATED ORAL DAILY
Refills: 0 | Status: ACTIVE | COMMUNITY

## 2023-12-01 RX ORDER — ASPIRIN 81 MG/1
81 TABLET, CHEWABLE ORAL
Refills: 0 | Status: DISCONTINUED | COMMUNITY
End: 2023-12-01

## 2023-12-01 RX ORDER — SITAGLIPTIN 100 MG/1
100 TABLET, FILM COATED ORAL
Refills: 0 | Status: DISCONTINUED | COMMUNITY
End: 2023-12-01

## 2023-12-01 RX ORDER — OMEPRAZOLE 40 MG/1
40 CAPSULE, DELAYED RELEASE ORAL
Refills: 0 | Status: ACTIVE | COMMUNITY

## 2023-12-01 RX ORDER — ALBIGLUTIDE 50 MG/.5ML
50 INJECTION, POWDER, LYOPHILIZED, FOR SOLUTION SUBCUTANEOUS
Refills: 0 | Status: DISCONTINUED | COMMUNITY
End: 2023-12-01

## 2023-12-01 RX ORDER — THIAMINE HCL 100 MG
100 TABLET ORAL
Refills: 0 | Status: DISCONTINUED | COMMUNITY
End: 2023-12-01

## 2023-12-01 RX ORDER — LOSARTAN POTASSIUM 25 MG/1
25 TABLET, FILM COATED ORAL
Refills: 0 | Status: ACTIVE | COMMUNITY

## 2023-12-01 RX ORDER — EZETIMIBE 10 MG/1
10 TABLET ORAL
Refills: 0 | Status: DISCONTINUED | COMMUNITY
End: 2023-12-01

## 2023-12-01 RX ORDER — ATORVASTATIN CALCIUM 40 MG/1
40 TABLET, FILM COATED ORAL
Refills: 0 | Status: DISCONTINUED | COMMUNITY
End: 2023-12-01

## 2023-12-01 RX ORDER — TAMSULOSIN HYDROCHLORIDE 0.4 MG/1
0.4 CAPSULE ORAL
Refills: 0 | Status: DISCONTINUED | COMMUNITY
End: 2023-12-01

## 2023-12-01 RX ORDER — OMEPRAZOLE 40 MG/1
40 CAPSULE, DELAYED RELEASE ORAL DAILY
Refills: 0 | Status: ACTIVE | COMMUNITY

## 2023-12-01 RX ORDER — FINASTERIDE 5 MG/1
5 TABLET, FILM COATED ORAL
Refills: 0 | Status: DISCONTINUED | COMMUNITY
End: 2023-12-01

## 2023-12-01 RX ORDER — PRAVASTATIN SODIUM 20 MG/1
20 TABLET ORAL
Refills: 0 | Status: ACTIVE | COMMUNITY

## 2023-12-01 RX ORDER — ASPIRIN 81 MG
81 TABLET, DELAYED RELEASE (ENTERIC COATED) ORAL DAILY
Refills: 0 | Status: ACTIVE | COMMUNITY

## 2023-12-01 RX ORDER — TAMSULOSIN HYDROCHLORIDE 0.4 MG/1
0.4 CAPSULE ORAL
Refills: 0 | Status: ACTIVE | COMMUNITY

## 2023-12-01 RX ORDER — LISINOPRIL 2.5 MG/1
2.5 TABLET ORAL
Refills: 0 | Status: DISCONTINUED | COMMUNITY
End: 2023-12-01

## 2023-12-01 RX ORDER — LOSARTAN POTASSIUM 25 MG/1
25 TABLET, FILM COATED ORAL DAILY
Refills: 0 | Status: ACTIVE | COMMUNITY

## 2023-12-01 RX ORDER — METFORMIN HYDROCHLORIDE 1000 MG/1
1000 TABLET, COATED ORAL
Refills: 0 | Status: ACTIVE | COMMUNITY

## 2023-12-01 RX ORDER — METFORMIN HYDROCHLORIDE 1000 MG/1
1000 TABLET, COATED ORAL
Refills: 0 | Status: DISCONTINUED | COMMUNITY
End: 2023-12-01

## 2023-12-01 RX ORDER — PIOGLITAZONE 45 MG/1
45 TABLET ORAL
Refills: 0 | Status: DISCONTINUED | COMMUNITY
End: 2023-12-01

## 2023-12-05 ENCOUNTER — APPOINTMENT (OUTPATIENT)
Dept: PODIATRY | Facility: CLINIC | Age: 68
End: 2023-12-05
Payer: MEDICARE

## 2023-12-05 DIAGNOSIS — M79.672 PAIN IN LEFT FOOT: ICD-10-CM

## 2023-12-05 DIAGNOSIS — M72.2 PLANTAR FASCIAL FIBROMATOSIS: ICD-10-CM

## 2023-12-05 DIAGNOSIS — E11.8 TYPE 2 DIABETES MELLITUS WITH UNSPECIFIED COMPLICATIONS: ICD-10-CM

## 2023-12-05 PROCEDURE — 99213 OFFICE O/P EST LOW 20 MIN: CPT | Mod: 25

## 2023-12-05 PROCEDURE — 20550 NJX 1 TENDON SHEATH/LIGAMENT: CPT | Mod: LT

## 2023-12-12 ENCOUNTER — APPOINTMENT (OUTPATIENT)
Dept: PAIN MANAGEMENT | Facility: CLINIC | Age: 68
End: 2023-12-12

## 2023-12-13 ENCOUNTER — APPOINTMENT (OUTPATIENT)
Dept: PAIN MANAGEMENT | Facility: CLINIC | Age: 68
End: 2023-12-13
Payer: MEDICARE

## 2023-12-13 VITALS
HEART RATE: 99 BPM | DIASTOLIC BLOOD PRESSURE: 75 MMHG | BODY MASS INDEX: 31.71 KG/M2 | SYSTOLIC BLOOD PRESSURE: 113 MMHG | WEIGHT: 202 LBS | HEIGHT: 67 IN

## 2023-12-13 PROCEDURE — 99215 OFFICE O/P EST HI 40 MIN: CPT

## 2023-12-13 RX ORDER — CYCLOBENZAPRINE HYDROCHLORIDE 10 MG/1
10 TABLET, FILM COATED ORAL
Refills: 0 | Status: ACTIVE | COMMUNITY

## 2023-12-13 NOTE — HISTORY OF PRESENT ILLNESS
[FreeTextEntry1] : ORIGINAL PRESENTATION: This is a 68-year-old man complaining of left-sided lower back and bilateral neck pain. Patient notes that pain in his lower back is the worst area of pain. The patient has had this pain for 4-5 months. The pain has worsened in the last 3-2 months. The pain started after no specific injury or event. Patient describes pain as moderate to severe. During the last month the pain has been nearly constant with symptoms worsening in no typical pattern. Pain described as burning, pressure-like, and cramping. Pain is associated with numbness and pins and needles into the upper and lower extremities. Patient has weakness in the lower extremities. Pain is increased with standing, walking, exercise, coughing/sneezing and bowel movements. Pain is decreased with lying down and sitting. Pain is not changed with relaxation. Bowel or bladder habits have changed as the patient notes using the bathroom is pain on both urination and bowel movements.  ACTIVITIES: Patient could walk about 3 minutes before the pain starts. Patient can sit 1 hour before pain starts. Patient can stand 5 minutes before pain starts. The patient constantly lays down because of pain. Patient uses no assistive walking device at this time. Patient has difficulty chores, doing yard work or shopping, socializing with friends, participating in recreational activities and exercising at this time.  PRIOR PAIN TREATMENTS: Moderate relief with physical therapy and heat treatment  PRIOR PAIN MEDICATIONS: Tylenol, aspirin and Motrin  PATIENT PRESENTS FOR FOLLOW UP: This is a Dr. Luo patient under our care for chronic lumbar and cervical pain with radiculopathy. He has secondary complaints of rectal pain in his coccyx area. We previously submit for a coccyx injection with fluoroscopic guidance which was not completed due to the office closures. The pain has increased since his previous visit and he notes he not able to ambulate from than 15 minutes before the pain begins.  The pain in the rectum is relieved after passing a bowl movement. GI workup did not reveal any pathology.

## 2023-12-13 NOTE — ASSESSMENT
[FreeTextEntry1] : 68-year-old male with chronic lumbar and cervical pain along with radiculopathy. Lumbar radiculopathy is stable status post second right L3-4 and L4-5 transforaminal lumbar epidural steroid injection on 5/9/23. At this time, the patient complains of rectal pain. He has been under the care of his GI specialist and PCP and the work up was clear. At this time, I recommend a coccyx block w/ mac and we will follow up afterwards for reassessment. All this patients questions were answered and the conversation was understood well.  The patient has severe anxiety of procedures that necessitates monitored anesthesia care (MAC). The procedure performed will be close to major nerves, arteries, and spinal cord and/or joint structures. Due to the proximity of these structures, we need the patient to be still during the procedure. With the help of MAC, this will be safely achieved and decrease the risk of any complications.  RISK AND BENEFIT PARAGRAPH: Risk, benefits, pros and cons of procedure were explained to the patient using models and diagrams and their questions were answered.  I, Sugey Ni, attest that this documentation has been prepared under the direction and in the presence of Provider Lor Gonsalez MD.   Thank you for allowing me to assist in the management of this patient.    Best Regards,    Lor Gonsalez M.D., FAAPMR   Diplomate, American Board of Physical Medicine and Rehabilitation Diplomate, American Board of Pain Medicine  Diplomate, American Board of Pain Management

## 2023-12-13 NOTE — DATA REVIEWED
[FreeTextEntry1] : IMPRESSION: Straightening of sagittal lordosis. Mild multilevel spondylosis. 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 Bilateral S1 lateral recess stenosis and bilateral L5-S1 foraminal stenosis. L4-5 moderate spinal and foraminal stenosis. L3-4 asymmetric disc bulge impinges on the right foramen. Multiple partially visualized left renal cysts measuring up to 4 cm in diameter.  UDS: No data obtained today.  [place] REGISTRY: Checked.

## 2024-01-09 ENCOUNTER — APPOINTMENT (OUTPATIENT)
Dept: PAIN MANAGEMENT | Facility: CLINIC | Age: 69
End: 2024-01-09
Payer: MEDICARE

## 2024-01-09 PROCEDURE — 94761 N-INVAS EAR/PLS OXIMETRY MLT: CPT

## 2024-01-09 PROCEDURE — 93770 DETERMINATION VENOUS PRESS: CPT

## 2024-01-09 PROCEDURE — 00630 ANES PX LUMBAR REGION NOS: CPT | Mod: QZ,P3

## 2024-01-09 PROCEDURE — 93040 RHYTHM ECG WITH REPORT: CPT | Mod: 59

## 2024-01-09 PROCEDURE — 64450 NJX AA&/STRD OTHER PN/BRANCH: CPT

## 2024-01-09 NOTE — PROCEDURE
[FreeTextEntry3] :  Coccyx / Ganglion Impar Injection under Fluoroscopy   Date:  2024  Patient: JAVI JUDD  :  1955  Preoperative Diagnosis: Coccygodynia  Procedure: Coccyx/Ganglion Impair injection under Fluoroscopy  Physician:  Lor Gonsalez MD, FAAPMR  Anesthesiologist/CRNA: Ms. King  Anesthesia: See nurses note. MAC/Cold spray. Versed 1 mg, Fentanyl 50 mcg IVP  Medical Necessity:  Failure of conservative management.  Indications:  The patient was admitted for a Coccyx/ Ganglion Impar Block for diagnostic and therapeutic purposes.  The patient was explained the technique, complications and rationale for the procedure and elected to proceed.  Indication for Fluoroscopy:  This procedure requires the precise placement of the spinal needle.  It is the only way to accurately and safely perform the injection.   CONSENT: The possible complications including infection, bleeding, nerve damage, hospital admission, death or failure of the procedure; though unusual, are theoretically possible. The patient was educated about the of the procedure and alternative therapies. All questions were answered and the patient freely gave consent to proceed.  Monitoring:  Patient had continuous blood pressure, EKG, and pulse oximetry throughout the case. See nurse's notes.   PROCEDURE NOTE:  After obtaining written consent, the patient was positioned prone on the fluoroscopy table. The area of the sacrum and Coccyx was prepped with betadine solution. A time out was performed.  Using Fluoroscopic guidance in AP and lateral views the area between the coccyx and sacrum was ID'd going midline at an AP approach. Using cold spray, the area of the skin was localized. Using a 3-inch 22-gauge spinal needle going slightly anterior to the coccyx and sacrum,  2 cc of 240 Omnipaque dye was injected and confirm the area of the impar ganglion showing a spread along the sacrum and coccyx midline and anterior. There was no vascular flow of dye. After negative aspiration a mixture of 4 cc of 0.5% bupivacaine with 1cc depomedrol (40) mg was injected. The needles were removed intact. A band aid was place on the site.  Complications: None. The patient tolerated the procedure well.   Disposition: I have examined the patient and there are no new physical findings since the original presentation.  Sensory and motor function were intact. The patient met discharge criteria see nurses notes. The discharge instruction sheet was reviewed and given to the patient. The patient was discharged home with a ..      Comment: If 70% relief greater than 2 hours or 50% greater than 24 hours would proceed to RFA. If 50% less than 24 hours would repeat to confirm for RFA. Follow in office. Call if any problems.        This document was electronically signed by:  Lor Gonsalez MD, FAAPMR Diplomate, American Board of Physical Medicine and Rehabilitation Diplomate, American Board of Pain Medicine

## 2024-01-23 ENCOUNTER — APPOINTMENT (OUTPATIENT)
Dept: PAIN MANAGEMENT | Facility: CLINIC | Age: 69
End: 2024-01-23
Payer: MEDICARE

## 2024-01-23 DIAGNOSIS — M53.3 SACROCOCCYGEAL DISORDERS, NOT ELSEWHERE CLASSIFIED: ICD-10-CM

## 2024-01-23 PROCEDURE — 99214 OFFICE O/P EST MOD 30 MIN: CPT

## 2024-01-23 NOTE — REASON FOR VISIT
[Follow-Up Visit] : a follow-up pain management visit Normal vision: sees adequately in most situations; can see medication labels, newsprint none

## 2024-01-23 NOTE — DISCUSSION/SUMMARY
[de-identified] : 68-year-old male with chronic lumbar and cervical pain along with radiculopathy which have been stable. He is s/p a coccyx injection which has improved his pain by 50%. At this time, patient wishes to hold off on additional injections. He will follow up in 2 months to re-evaluate his pain. He is aware he can contact me with any questions or concerns in the interim.  BRODERICK Slater MD

## 2024-01-23 NOTE — PHYSICAL EXAM
[Normal Coordination] : normal coordination [Normal Sensation] : normal sensation [Normal Mood and Affect] : normal mood and affect [Well Developed] : well developed [Able to Communicate] : able to communicate [Well Nourished] : well nourished [Flexion] : flexion [Extension] : extension [Rotation to left] : rotation to left [Rotation to right] : rotation to right [] : patient ambulates without assistive device [FreeTextEntry8] : +SLR r at 45 degrees, negative on the L

## 2024-01-23 NOTE — HISTORY OF PRESENT ILLNESS
[FreeTextEntry1] : ORIGINAL PRESENTATION: This is a 68-year-old man complaining of left-sided lower back and bilateral neck pain. Patient notes that pain in his lower back is the worst area of pain. The patient has had this pain for 4-5 months. The pain has worsened in the last 3-2 months. The pain started after no specific injury or event. Patient describes pain as moderate to severe. During the last month the pain has been nearly constant with symptoms worsening in no typical pattern. Pain described as burning, pressure-like, and cramping. Pain is associated with numbness and pins and needles into the upper and lower extremities. Patient has weakness in the lower extremities. Pain is increased with standing, walking, exercise, coughing/sneezing and bowel movements. Pain is decreased with lying down and sitting. Pain is not changed with relaxation. Bowel or bladder habits have changed as the patient notes using the bathroom is pain on both urination and bowel movements.  ACTIVITIES: Patient could walk about 3 minutes before the pain starts. Patient can sit 1 hour before pain starts. Patient can stand 5 minutes before pain starts. The patient constantly lays down because of pain. Patient uses no assistive walking device at this time. Patient has difficulty chores, doing yard work or shopping, socializing with friends, participating in recreational activities and exercising at this time.  PRIOR PAIN TREATMENTS: Moderate relief with physical therapy and heat treatment  PRIOR PAIN MEDICATIONS: Tylenol, aspirin and Motrin  PATIENT PRESENTS FOR FOLLOW UP: Patient presents for a revisit appointment. He underwent a coccyx injection on 1/9/24. Pt states that his pain has improved by 50%, he experiences pain occasionally at the coccyx area for which we recommended a donut pillow seat cushion. He will f/u in 2 months to see if he needs another injection, but for now he has sufficient relief.  Of note, patient's neck and lower back pain with radiculopathy are relatively stable. He underwent ЕЛЕНА's in the past with excellent relief. He also had SNRI's which helped him.

## 2024-02-12 ENCOUNTER — APPOINTMENT (OUTPATIENT)
Dept: PODIATRY | Facility: CLINIC | Age: 69
End: 2024-02-12

## 2024-02-27 ENCOUNTER — APPOINTMENT (OUTPATIENT)
Dept: PAIN MANAGEMENT | Facility: CLINIC | Age: 69
End: 2024-02-27

## 2024-03-05 NOTE — ADDENDUM
[FreeTextEntry1] : Agree with the above documentation as outlined by the PA which I have addended as necessary.\par  No

## 2024-03-12 ENCOUNTER — APPOINTMENT (OUTPATIENT)
Dept: PAIN MANAGEMENT | Facility: CLINIC | Age: 69
End: 2024-03-12
Payer: MEDICARE

## 2024-03-12 DIAGNOSIS — M54.12 RADICULOPATHY, CERVICAL REGION: ICD-10-CM

## 2024-03-12 DIAGNOSIS — M48.061 SPINAL STENOSIS, LUMBAR REGION WITHOUT NEUROGENIC CLAUDICATION: ICD-10-CM

## 2024-03-12 DIAGNOSIS — M43.06 SPONDYLOLYSIS, LUMBAR REGION: ICD-10-CM

## 2024-03-12 DIAGNOSIS — M43.02 SPONDYLOLYSIS, CERVICAL REGION: ICD-10-CM

## 2024-03-12 PROCEDURE — 99214 OFFICE O/P EST MOD 30 MIN: CPT

## 2024-03-12 NOTE — PHYSICAL EXAM
[Normal Coordination] : normal coordination [Normal Mood and Affect] : normal mood and affect [Normal Sensation] : normal sensation [Well Nourished] : well nourished [Able to Communicate] : able to communicate [Well Developed] : well developed [Extension] : extension [Flexion] : flexion [Rotation to right] : rotation to right [Rotation to left] : rotation to left [] : diminished ROM in all planes [FreeTextEntry8] : +SLR r at 45 degrees, negative on the L

## 2024-03-12 NOTE — DISCUSSION/SUMMARY
[de-identified] : 68-year-old male with chronic lumbar and cervical pain along with radiculopathy. There is an increase of his right sided lower back pain with radiation of pain into right lower extremity and difficulties ambulating. He underwent Right L3-4,L4-5 SNRI back in May 2023 and had a good pain relief of at least 65% for over six weeks, and improvement of functioning. His pain came back recently. We will request another SNRI today.   EPIDURAL SNRI PARAGRAPH: Patient had a MRI that shows a radicular component along with pain referred into the lower extremity. Patient has trialed rehab (Home exercise, physical therapy or chiropractic care) and medications I will schedule a Right L3-4,L4-5 SNRI.  Risk, benefits, pros and cons of procedure were explained to the patient using models and diagrams and their questions were answered.  The patient has severe anxiety of procedures that necessitates monitored anesthesia care (MAC). The procedure performed will be close to major nerves, arteries, and spinal cord and/or joint structures. Due to the proximity of these structures, we need the patient to be still during the procedure. With the help of MAC, this will be safely achieved and decrease the risk of any complications.  Total clinician time spent today on the patient is 30 minutes including preparing to see the patient, obtaining and/or reviewing and confirming history, performing medically necessary and appropriate examination, counseling and educating the patient and/or family, documenting clinical information in the EHR and communicating and/or referring to other healthcare professionals.  BRODERICK Herrera MD

## 2024-03-12 NOTE — HISTORY OF PRESENT ILLNESS
[FreeTextEntry1] : ORIGINAL PRESENTATION: This is a 68-year-old man complaining of left-sided lower back and bilateral neck pain. Patient notes that pain in his lower back is the worst area of pain. The patient has had this pain for 4-5 months. The pain has worsened in the last 3-2 months. The pain started after no specific injury or event. Patient describes pain as moderate to severe. During the last month the pain has been nearly constant with symptoms worsening in no typical pattern. Pain described as burning, pressure-like, and cramping. Pain is associated with numbness and pins and needles into the upper and lower extremities. Patient has weakness in the lower extremities. Pain is increased with standing, walking, exercise, coughing/sneezing and bowel movements. Pain is decreased with lying down and sitting. Pain is not changed with relaxation. Bowel or bladder habits have changed as the patient notes using the bathroom is pain on both urination and bowel movements.  ACTIVITIES: Patient could walk about 3 minutes before the pain starts. Patient can sit 1 hour before pain starts. Patient can stand 5 minutes before pain starts. The patient constantly lays down because of pain. Patient uses no assistive walking device at this time. Patient has difficulty chores, doing yard work or shopping, socializing with friends, participating in recreational activities and exercising at this time.  PRIOR PAIN TREATMENTS: Moderate relief with physical therapy and heat treatment  PRIOR PAIN MEDICATIONS: Tylenol, aspirin and Motrin  PATIENT PRESENTS FOR FOLLOW UP: Last seen on 01/23/2024 and today presents with an increase of his right sided lower back pain with radiation of pain into right lower extremity and difficulties ambulating. He underwent Right L3-4,L4-5 SNRI back in May 2023 and had a good pain relief of at least 65% for over six weeks, and improvement of functioning. His pain came back recently. We will request another SNRI today.  He underwent a coccyx injection on 1/9/24. Pt states that his pain has improved by 50%, and that relief is still  sustained today. He experiences pain occasionally at the coccyx area for which we recommended a donut pillow seat cushion.

## 2024-03-18 NOTE — ED ADULT NURSE NOTE - NS ED NOTE ABUSE SUSPICION NEGLECT YN
[facility]  [address]  [facility] [address]  Phone [phone]  Fax [phone]    NAME: JAKUB MCKEON  YOB: 1943  INITIAL CONSULTATION: 04/26/2004  LAST OFFICE VISIT: 02/27/2023  TODAY'S VISIT: 03/18/2024    Labs @  12/2022    PROBLEMS:   \"Osteopenia\" (12/2003 T-scores spine -1.6, left femoral neck -0.3)    Bone density declined significantly between 1996 and 2003    Mother has severe osteoporosis  Surgical menopause age 54 (1997)  Irritable bowel syndrome  Vitamin D deficiency, desirable 25-OH D is 30 to 60 ng/mL    18 ng/mL 04/2004     46 ng/mL 07/2005 with ergocalciferol 50,000 IU weekly    36 ng/mL 07/2012 with vitamin D 2000 IU/d    64 ng/mL 05/2017    56 ng/mL 04/2023    CURRENT MANAGEMENT FOR BONE HEALTH:   Calcium, 450 mg/d from diet + 600 mg supplement + 220 in MVI = 1270 mg/d    300 mg other, 150 mg milk (avoiding yogurt and cheese to lose weight)  Vitamin D, 2000 IU/d + 500 in MVI = 2500 IU/d  Exercise, walking, swimming, riding stationary bike  Pharmacologic therapy, alendronate 70 mg weekly again 01/2016-01/2021, resumed 02/2022    PREVIOUS MEDICATIONS FOR OSTEOPOROSIS: Estrogen [id number]  Actonel 35 mg weekly 07/2004-10/2009, stopped for a “holiday”    OTHER CURRENT MEDICATIONS (SELECTED): None  OTC MEDICATIONS: glucosamine/chondroitin, vitamin C, Metamucil    CHIEF COMPLAINT: Here for f/u visit of osteopenia and vitamin D deficiency, monitoring treatment. No new related signs or symptoms.    PAST MEDICAL HISTORY, FAMILY HISTORY, SOCIAL HISTORY AND REVIEW OF SYSTEMS:  Relevant changes since last visit (see patient questionnaire of today's date).    INTERVAL HISTORY.   See problem list for active/ongoing issues.  She has been taking alendronate correctly and without side effects.   No falls, near-falls or fractures.   Feels well overall.    NEUROLOGIC EXAM: Able to rise from chair without using arms.  No apparent focal 
No

## 2024-03-29 ENCOUNTER — NON-APPOINTMENT (OUTPATIENT)
Age: 69
End: 2024-03-29

## 2024-04-16 ENCOUNTER — APPOINTMENT (OUTPATIENT)
Dept: PAIN MANAGEMENT | Facility: CLINIC | Age: 69
End: 2024-04-16

## 2024-04-16 ENCOUNTER — APPOINTMENT (OUTPATIENT)
Dept: PAIN MANAGEMENT | Facility: CLINIC | Age: 69
End: 2024-04-16
Payer: MEDICARE

## 2024-04-16 PROCEDURE — 94761 N-INVAS EAR/PLS OXIMETRY MLT: CPT

## 2024-04-16 PROCEDURE — 93770 DETERMINATION VENOUS PRESS: CPT | Mod: 59

## 2024-04-16 PROCEDURE — 93040 RHYTHM ECG WITH REPORT: CPT | Mod: 79

## 2024-04-16 PROCEDURE — 64483 NJX AA&/STRD TFRM EPI L/S 1: CPT | Mod: RT

## 2024-04-16 PROCEDURE — 64484 NJX AA&/STRD TFRM EPI L/S EA: CPT | Mod: RT

## 2024-04-16 NOTE — PROCEDURE
[FreeTextEntry3] : SELECTIVE TRANSFORAMINAL LUMBAR EPIDURAL NERVE ROOT INJECTION UNDER FLUOROSCOPY    Date:  2024  Patient: JAVI JUDD  :  1955   Preoperative Diagnosis: Right L4 radiculitis; Right L3 radiculitis  Procedure: Selective Right L3-4; L4-5 Transforaminal Lumbar Epidural Nerve Root Injection under Fluoroscopy  Physician: Lor Gonsalez MD FAAPMR   Anesthesia: See nurses notes/Local/Cold spray   Medical Necessity:  Failure of conservative management.   Indication for Fluoroscopy:  This procedure requires the precise placement of the spinal needle into the specific paravertebral foramen.  It is the only way to accurately and safely perform the injection.   CONSENT: The possible complications including infection, bleeding, nerve damage, Hospital admission, death or failure of the procedure; though unusual, are theoretically possible. The patient was educated about the of the procedure and alternative therapies. All questions were answered and the patient freely gave consent to proceed.   Monitoring:  Patient had continuous blood pressure, EKG, and pulse oximetry throughout the case. See nurse's notes.    PROCEDURE NOTE:  After obtaining written consent, the patient was placed on the fluoroscopic table in the prone position with the pillow placed under the hips. The lumbar area was prepped with betadine solution and draped in the usual manner. A time out was performed.  Cold spray was used to localize the area. The fluoroscope was used to identify the L2///L3///L4 vertebral body on the AP projection. It was then rotated into an oblique projection until the superior articulating process of the L4 (inferior) vertebra is projected beneath the 6 o'clock position of the L3 (superior) vertebrae. The 22 gauge 3.5inch needle was inserted in the skin at a point overlying the superior articulating process of the inferior vertebra and aimed for the 6 o'clock position of the superior vertebrae's pedicle.  After the needle contacted bone, a lateral projection was obtained to insure that the needle tip was in proximity with the vertebral body. Paresthesias were not noted.  One ml of Omnipaque 240 was injected and a neurogram was obtained. Following demonstration of the neurogram, 1 ml of Preservative free normal saline and 1 ml of depomedrol (40mg) was injected. The small volume and relatively high concentration was chosen to preserve selectivity and diagnostic value of the injection. There was no CSF or heme identified.  The L4-5 level was injected in identical fashion. There were no signs of, intravascular block or hypotension. The needle was removed intact. A band aid was place on the site.   Epidurogram: The nerve root was observed in its outline on the neurogram. Distal and proximal spread was noted pointing away from epidural fibrosis/scarring.    Complications: None. The patient tolerated the procedure well.      Disposition: I have examined the patient and there are no new physical findings since the original presentation.  Sensory and motor function were intact. The patient met discharge criteria see nurses notes. The discharge instruction sheet was reviewed and given to the patient. The patient was discharged home with a .     If patient gets sustained relief will have patient do modified planks 3 times a day on carpet or yoga mat starting at 5 seconds building up to 1 minute without grimacing/Valsalva and walking.      Comment: 1st SNRI today, depending on effectiveness would schedule 2nd SNRI in 1-2 weeks vs caudal epidural steroid vs follow up in office depending on insurances. Follow up office. Call if any problems   This document was electronically signed by:    Lor Gonsalez MD, FAAPMR Diplomate, American Board of Physical Medicine and Rehabilitation Diplomate, American Board of Pain Medicine

## 2024-05-01 NOTE — DISCHARGE NOTE PROVIDER - NSDCQMSTAIRS_GEN_ALL_CORE
----- Message from Radha Beatty RN sent at 5/1/2024  3:14 PM CDT -----    ----- Message -----  From: Tyler Townsend MD  Sent: 5/1/2024   3:11 PM CDT  To: Radha Beatty RN    Please check with the patient when she is leaving for [employer].  She may need an angiogram.  I would like to see her early next week to discuss further.    Conveyed above to patient. Patient verbalizes understanding. Patient leaves for [employer] on 5/9. Patient will see Dr. Townsend on 5/6 @ 2099.      No

## 2024-05-02 ENCOUNTER — APPOINTMENT (OUTPATIENT)
Dept: PAIN MANAGEMENT | Facility: CLINIC | Age: 69
End: 2024-05-02

## 2024-05-14 ENCOUNTER — APPOINTMENT (OUTPATIENT)
Dept: PAIN MANAGEMENT | Facility: CLINIC | Age: 69
End: 2024-05-14

## 2024-05-15 ENCOUNTER — APPOINTMENT (OUTPATIENT)
Dept: PAIN MANAGEMENT | Facility: CLINIC | Age: 69
End: 2024-05-15
Payer: MEDICARE

## 2024-05-15 DIAGNOSIS — M54.16 RADICULOPATHY, LUMBAR REGION: ICD-10-CM

## 2024-05-15 DIAGNOSIS — M51.36 OTHER INTERVERTEBRAL DISC DEGENERATION, LUMBAR REGION: ICD-10-CM

## 2024-05-15 DIAGNOSIS — M53.3 SACROCOCCYGEAL DISORDERS, NOT ELSEWHERE CLASSIFIED: ICD-10-CM

## 2024-05-15 DIAGNOSIS — M51.16 INTERVERTEBRAL DISC DISORDERS WITH RADICULOPATHY, LUMBAR REGION: ICD-10-CM

## 2024-05-15 PROCEDURE — 99214 OFFICE O/P EST MOD 30 MIN: CPT

## 2024-05-15 NOTE — ASSESSMENT
[FreeTextEntry1] : Patient is a 69 year old male who is under our care for lower back pain with radicular features down his lower extremities bilaterally. He is s/p a right L3-4, L4-5 SNRI on 04/16/2024 which gave him 40-50% relief of his right sided complaints. Today, he complains of lower back pain with left-sided radicular features. Pain travels down his leg to his calf. He will start PT for his lumbar spine. He will proceed with a left L3-4, L4-5 SNRI w/ MAC. RTO post-injection.  Physical therapy of the lumbar spine 2-3 times a week for 4-8 weeks stressing a home exercise program of walking, shoulder girdle strengthening, swimming, elliptical , recumbent bike, Angel chi and Yoga. Use things that heat like hot shower or icy heat before rehab,exercising and at the beginning of the day, and ice (ice in a bag never directly on the skin) after activity and at the end of the day.  Patient had a MRI that shows a radicular component along with pain referred into the lower extremity. Patient has trialed rehab (Home exercise, physical therapy or chiropractic care) and medications I will schedule a LEFT L3-4,L4-5 TFESI.  Risk, benefits, pros and cons of the procedure were explained to the patient using models and diagrams and their questions were answered.  The patient has severe anxiety of procedures that necessitates monitored anesthesia care (MAC). The procedure performed will be close to major nerves, arteries, and spinal cord and/or joint structures. Due to the proximity of these structures, we need the patient to be still during the procedure. With the help of MAC, this will be safely achieved and decrease the risk of any complications.  BRODERICK Slater MD

## 2024-05-15 NOTE — PHYSICAL EXAM
[Normal Coordination] : normal coordination [Normal Sensation] : normal sensation [Normal Mood and Affect] : normal mood and affect [Able to Communicate] : able to communicate [Well Developed] : well developed [Well Nourished] : well nourished [Flexion] : flexion [Extension] : extension [Rotation to left] : rotation to left [Rotation to right] : rotation to right [] : patient ambulates without assistive device [FreeTextEntry8] : +SLR L at 45 degrees

## 2024-05-15 NOTE — HISTORY OF PRESENT ILLNESS
[FreeTextEntry1] : ORIGINAL PRESENTATION: This is a 68-year-old man complaining of left-sided lower back and bilateral neck pain. Patient notes that pain in his lower back is the worst area of pain. The patient has had this pain for 4-5 months. The pain has worsened in the last 3-2 months. The pain started after no specific injury or event. Patient describes pain as moderate to severe. During the last month the pain has been nearly constant with symptoms worsening in no typical pattern. Pain described as burning, pressure-like, and cramping. Pain is associated with numbness and pins and needles into the upper and lower extremities. Patient has weakness in the lower extremities. Pain is increased with standing, walking, exercise, coughing/sneezing and bowel movements. Pain is decreased with lying down and sitting. Pain is not changed with relaxation. Bowel or bladder habits have changed as the patient notes using the bathroom is pain on both urination and bowel movements.  ACTIVITIES: Patient could walk about 3 minutes before the pain starts. Patient can sit 1 hour before pain starts. Patient can stand 5 minutes before pain starts. The patient constantly lays down because of pain. Patient uses no assistive walking device at this time. Patient has difficulty chores, doing yard work or shopping, socializing with friends, participating in recreational activities and exercising at this time.  PRIOR PAIN TREATMENTS: Moderate relief with physical therapy and heat treatment  PRIOR PAIN MEDICATIONS: Tylenol, aspirin and Motrin  PATIENT PRESENTS FOR FOLLOW UP:  Pt is a 69 year old male who is here today for a follow up. He is accompanied by his son. He is under our care for lower back pain with radicular features down his lower extremities bilaterally. On his last visit, he presented to the office complaining of increased back pain and right leg pain. He underwent a right L3-4, L4-5 SNRI on 04/16/2024, which gave him 40-50% relief. He states his right leg pain has definitely subsided. Today, he is mostly complaining of left sided back pain with left leg pain traveling down to his calf.  We discussed PT as well as a left L3-4, L4-5 SNRI w/ MAC and he agrees.   He also sees us for coccyx pain. He underwent a coccyx injection on 1/9/24. Pt states that his pain has improved by 50% and that his relief is still sustained today. He experiences pain occasionally at the coccyx area for which we recommended a donut pillow seat cushion. Overall, his pain is stable for now.   For medical management of his pain, he takes a muscle relaxant which he does not know the name of.

## 2024-06-04 ENCOUNTER — APPOINTMENT (OUTPATIENT)
Dept: PAIN MANAGEMENT | Facility: CLINIC | Age: 69
End: 2024-06-04

## 2024-06-20 ENCOUNTER — APPOINTMENT (OUTPATIENT)
Dept: PAIN MANAGEMENT | Facility: CLINIC | Age: 69
End: 2024-06-20

## 2024-06-24 RX ORDER — CHOLECALCIFEROL (VITAMIN D3) 125 MCG
1 CAPSULE ORAL
Qty: 0 | Refills: 0 | DISCHARGE

## 2024-06-26 ENCOUNTER — OUTPATIENT (OUTPATIENT)
Dept: OUTPATIENT SERVICES | Facility: HOSPITAL | Age: 69
LOS: 1 days | Discharge: HOME | End: 2024-06-26
Payer: MEDICARE

## 2024-06-26 VITALS — WEIGHT: 190.04 LBS | HEIGHT: 68 IN

## 2024-06-26 VITALS
OXYGEN SATURATION: 97 % | DIASTOLIC BLOOD PRESSURE: 62 MMHG | RESPIRATION RATE: 15 BRPM | SYSTOLIC BLOOD PRESSURE: 120 MMHG | HEART RATE: 62 BPM

## 2024-06-26 DIAGNOSIS — E78.5 HYPERLIPIDEMIA, UNSPECIFIED: ICD-10-CM

## 2024-06-26 DIAGNOSIS — I25.10 ATHEROSCLEROTIC HEART DISEASE OF NATIVE CORONARY ARTERY WITHOUT ANGINA PECTORIS: ICD-10-CM

## 2024-06-26 DIAGNOSIS — I25.110 ATHEROSCLEROTIC HEART DISEASE OF NATIVE CORONARY ARTERY WITH UNSTABLE ANGINA PECTORIS: ICD-10-CM

## 2024-06-26 DIAGNOSIS — Z95.1 PRESENCE OF AORTOCORONARY BYPASS GRAFT: ICD-10-CM

## 2024-06-26 DIAGNOSIS — I10 ESSENTIAL (PRIMARY) HYPERTENSION: ICD-10-CM

## 2024-06-26 DIAGNOSIS — Z98.890 OTHER SPECIFIED POSTPROCEDURAL STATES: Chronic | ICD-10-CM

## 2024-06-26 LAB
ANION GAP SERPL CALC-SCNC: 10 MMOL/L — SIGNIFICANT CHANGE UP (ref 7–14)
BUN SERPL-MCNC: 17 MG/DL — SIGNIFICANT CHANGE UP (ref 10–20)
CALCIUM SERPL-MCNC: 9.5 MG/DL — SIGNIFICANT CHANGE UP (ref 8.4–10.4)
CHLORIDE SERPL-SCNC: 106 MMOL/L — SIGNIFICANT CHANGE UP (ref 98–110)
CO2 SERPL-SCNC: 25 MMOL/L — SIGNIFICANT CHANGE UP (ref 17–32)
CREAT SERPL-MCNC: 1.1 MG/DL — SIGNIFICANT CHANGE UP (ref 0.7–1.5)
EGFR: 73 ML/MIN/1.73M2 — SIGNIFICANT CHANGE UP
GLUCOSE BLDC GLUCOMTR-MCNC: 146 MG/DL — HIGH (ref 70–99)
GLUCOSE SERPL-MCNC: 154 MG/DL — HIGH (ref 70–99)
HCT VFR BLD CALC: 43.5 % — SIGNIFICANT CHANGE UP (ref 42–52)
HGB BLD-MCNC: 14.1 G/DL — SIGNIFICANT CHANGE UP (ref 14–18)
MCHC RBC-ENTMCNC: 28.3 PG — SIGNIFICANT CHANGE UP (ref 27–31)
MCHC RBC-ENTMCNC: 32.4 G/DL — SIGNIFICANT CHANGE UP (ref 32–37)
MCV RBC AUTO: 87.3 FL — SIGNIFICANT CHANGE UP (ref 80–94)
NRBC # BLD: 0 /100 WBCS — SIGNIFICANT CHANGE UP (ref 0–0)
PLATELET # BLD AUTO: 177 K/UL — SIGNIFICANT CHANGE UP (ref 130–400)
PMV BLD: 12 FL — HIGH (ref 7.4–10.4)
POTASSIUM SERPL-MCNC: 4.3 MMOL/L — SIGNIFICANT CHANGE UP (ref 3.5–5)
POTASSIUM SERPL-SCNC: 4.3 MMOL/L — SIGNIFICANT CHANGE UP (ref 3.5–5)
RBC # BLD: 4.98 M/UL — SIGNIFICANT CHANGE UP (ref 4.7–6.1)
RBC # FLD: 14.2 % — SIGNIFICANT CHANGE UP (ref 11.5–14.5)
SODIUM SERPL-SCNC: 141 MMOL/L — SIGNIFICANT CHANGE UP (ref 135–146)
WBC # BLD: 6.89 K/UL — SIGNIFICANT CHANGE UP (ref 4.8–10.8)
WBC # FLD AUTO: 6.89 K/UL — SIGNIFICANT CHANGE UP (ref 4.8–10.8)

## 2024-06-26 PROCEDURE — 80048 BASIC METABOLIC PNL TOTAL CA: CPT

## 2024-06-26 PROCEDURE — 85027 COMPLETE CBC AUTOMATED: CPT

## 2024-06-26 PROCEDURE — 82962 GLUCOSE BLOOD TEST: CPT

## 2024-06-26 PROCEDURE — C1894: CPT

## 2024-06-26 PROCEDURE — 36415 COLL VENOUS BLD VENIPUNCTURE: CPT

## 2024-06-26 PROCEDURE — C1769: CPT

## 2024-06-26 PROCEDURE — C1887: CPT

## 2024-06-26 PROCEDURE — 93459 L HRT ART/GRFT ANGIO: CPT

## 2024-06-26 RX ORDER — OMEPRAZOLE 10 MG/1
1 CAPSULE, DELAYED RELEASE ORAL
Qty: 0 | Refills: 0 | DISCHARGE

## 2024-06-26 RX ORDER — INSULIN LISPRO 100/ML
0 VIAL (ML) SUBCUTANEOUS
Refills: 0 | DISCHARGE

## 2024-06-26 RX ORDER — METOPROLOL TARTRATE 50 MG
12.5 TABLET ORAL ONCE
Refills: 0 | Status: COMPLETED | OUTPATIENT
Start: 2024-06-26 | End: 2024-06-26

## 2024-06-26 RX ORDER — ATORVASTATIN CALCIUM 20 MG/1
1 TABLET, FILM COATED ORAL
Refills: 0 | DISCHARGE

## 2024-06-26 RX ORDER — ASPIRIN/CALCIUM CARB/MAGNESIUM 324 MG
1 TABLET ORAL
Qty: 0 | Refills: 0 | DISCHARGE

## 2024-06-26 RX ORDER — SODIUM CHLORIDE 0.9 % (FLUSH) 0.9 %
1000 SYRINGE (ML) INJECTION
Refills: 0 | Status: DISCONTINUED | OUTPATIENT
Start: 2024-06-26 | End: 2024-06-26

## 2024-06-26 RX ORDER — METFORMIN HYDROCHLORIDE 850 MG/1
1 TABLET ORAL
Qty: 0 | Refills: 0 | DISCHARGE

## 2024-06-26 RX ORDER — LOSARTAN POTASSIUM 100 MG/1
1 TABLET, FILM COATED ORAL
Qty: 0 | Refills: 0 | DISCHARGE

## 2024-06-26 RX ORDER — EZETIMIBE 10 MG/1
1 TABLET ORAL
Refills: 0 | DISCHARGE

## 2024-06-26 RX ORDER — AMLODIPINE BESYLATE 2.5 MG/1
2.5 TABLET ORAL ONCE
Refills: 0 | Status: COMPLETED | OUTPATIENT
Start: 2024-06-26 | End: 2024-06-26

## 2024-06-26 RX ORDER — SEMAGLUTIDE 0.68 MG/ML
1 INJECTION, SOLUTION SUBCUTANEOUS
Qty: 0 | Refills: 0 | DISCHARGE

## 2024-06-26 RX ORDER — INSULIN GLARGINE 100 [IU]/ML
24 INJECTION, SOLUTION SUBCUTANEOUS
Refills: 0 | DISCHARGE

## 2024-06-26 RX ORDER — SODIUM CHLORIDE 0.9 % (FLUSH) 0.9 %
250 SYRINGE (ML) INJECTION ONCE
Refills: 0 | Status: COMPLETED | OUTPATIENT
Start: 2024-06-26 | End: 2024-06-26

## 2024-06-26 RX ADMIN — AMLODIPINE BESYLATE 2.5 MILLIGRAM(S): 2.5 TABLET ORAL at 15:40

## 2024-06-26 RX ADMIN — Medication 500 MILLILITER(S): at 15:41

## 2024-06-26 RX ADMIN — Medication 12.5 MILLIGRAM(S): at 15:41

## 2024-07-09 ENCOUNTER — APPOINTMENT (OUTPATIENT)
Dept: PAIN MANAGEMENT | Facility: CLINIC | Age: 69
End: 2024-07-09
Payer: MEDICARE

## 2024-07-09 ENCOUNTER — APPOINTMENT (OUTPATIENT)
Dept: PAIN MANAGEMENT | Facility: CLINIC | Age: 69
End: 2024-07-09

## 2024-07-09 DIAGNOSIS — M54.16 RADICULOPATHY, LUMBAR REGION: ICD-10-CM

## 2024-07-09 PROCEDURE — 64483 NJX AA&/STRD TFRM EPI L/S 1: CPT | Mod: LT

## 2024-07-09 PROCEDURE — 94761 N-INVAS EAR/PLS OXIMETRY MLT: CPT

## 2024-07-09 PROCEDURE — 64484 NJX AA&/STRD TFRM EPI L/S EA: CPT | Mod: LT

## 2024-07-09 PROCEDURE — 93770 DETERMINATION VENOUS PRESS: CPT | Mod: 59

## 2024-07-09 PROCEDURE — 93040 RHYTHM ECG WITH REPORT: CPT | Mod: 79

## 2024-07-24 ENCOUNTER — APPOINTMENT (OUTPATIENT)
Dept: PAIN MANAGEMENT | Facility: CLINIC | Age: 69
End: 2024-07-24

## 2024-08-12 ENCOUNTER — APPOINTMENT (OUTPATIENT)
Dept: PODIATRY | Facility: CLINIC | Age: 69
End: 2024-08-12

## 2024-10-04 ENCOUNTER — APPOINTMENT (OUTPATIENT)
Dept: SURGERY | Facility: CLINIC | Age: 69
End: 2024-10-04

## 2024-10-18 ENCOUNTER — APPOINTMENT (OUTPATIENT)
Dept: SURGERY | Facility: CLINIC | Age: 69
End: 2024-10-18

## 2024-12-03 ENCOUNTER — EMERGENCY (EMERGENCY)
Facility: HOSPITAL | Age: 69
LOS: 0 days | Discharge: HOME | End: 2024-12-03
Attending: EMERGENCY MEDICINE
Payer: MEDICARE

## 2024-12-03 VITALS
TEMPERATURE: 99 F | DIASTOLIC BLOOD PRESSURE: 81 MMHG | SYSTOLIC BLOOD PRESSURE: 176 MMHG | OXYGEN SATURATION: 99 % | RESPIRATION RATE: 16 BRPM | WEIGHT: 190.04 LBS | HEART RATE: 80 BPM

## 2024-12-03 DIAGNOSIS — Z95.1 PRESENCE OF AORTOCORONARY BYPASS GRAFT: ICD-10-CM

## 2024-12-03 DIAGNOSIS — E78.5 HYPERLIPIDEMIA, UNSPECIFIED: ICD-10-CM

## 2024-12-03 DIAGNOSIS — I25.10 ATHEROSCLEROTIC HEART DISEASE OF NATIVE CORONARY ARTERY WITHOUT ANGINA PECTORIS: ICD-10-CM

## 2024-12-03 DIAGNOSIS — M54.50 LOW BACK PAIN, UNSPECIFIED: ICD-10-CM

## 2024-12-03 DIAGNOSIS — M54.9 DORSALGIA, UNSPECIFIED: ICD-10-CM

## 2024-12-03 DIAGNOSIS — M25.552 PAIN IN LEFT HIP: ICD-10-CM

## 2024-12-03 DIAGNOSIS — Z98.890 OTHER SPECIFIED POSTPROCEDURAL STATES: Chronic | ICD-10-CM

## 2024-12-03 DIAGNOSIS — E11.9 TYPE 2 DIABETES MELLITUS WITHOUT COMPLICATIONS: ICD-10-CM

## 2024-12-03 DIAGNOSIS — I10 ESSENTIAL (PRIMARY) HYPERTENSION: ICD-10-CM

## 2024-12-03 DIAGNOSIS — Z88.0 ALLERGY STATUS TO PENICILLIN: ICD-10-CM

## 2024-12-03 PROCEDURE — 96372 THER/PROPH/DIAG INJ SC/IM: CPT

## 2024-12-03 PROCEDURE — 73502 X-RAY EXAM HIP UNI 2-3 VIEWS: CPT | Mod: 26,LT

## 2024-12-03 PROCEDURE — 73564 X-RAY EXAM KNEE 4 OR MORE: CPT | Mod: LT

## 2024-12-03 PROCEDURE — 99284 EMERGENCY DEPT VISIT MOD MDM: CPT | Mod: 25

## 2024-12-03 PROCEDURE — 36000 PLACE NEEDLE IN VEIN: CPT

## 2024-12-03 PROCEDURE — 99284 EMERGENCY DEPT VISIT MOD MDM: CPT

## 2024-12-03 PROCEDURE — 73502 X-RAY EXAM HIP UNI 2-3 VIEWS: CPT | Mod: LT

## 2024-12-03 PROCEDURE — 82962 GLUCOSE BLOOD TEST: CPT

## 2024-12-03 PROCEDURE — 73564 X-RAY EXAM KNEE 4 OR MORE: CPT | Mod: 26,LT

## 2024-12-03 RX ORDER — TIZANIDINE 4 MG/1
2 TABLET ORAL
Qty: 30 | Refills: 0
Start: 2024-12-03 | End: 2024-12-07

## 2024-12-03 RX ORDER — IBUPROFEN 200 MG
1 TABLET ORAL
Qty: 20 | Refills: 0
Start: 2024-12-03 | End: 2024-12-07

## 2024-12-03 RX ORDER — LIDOCAINE 40 MG/G
1 CREAM TOPICAL ONCE
Refills: 0 | Status: COMPLETED | OUTPATIENT
Start: 2024-12-03 | End: 2024-12-03

## 2024-12-03 RX ORDER — ACETAMINOPHEN 500MG 500 MG/1
975 TABLET, COATED ORAL ONCE
Refills: 0 | Status: COMPLETED | OUTPATIENT
Start: 2024-12-03 | End: 2024-12-03

## 2024-12-03 RX ORDER — METHOCARBAMOL 500 MG/1
750 TABLET, FILM COATED ORAL ONCE
Refills: 0 | Status: COMPLETED | OUTPATIENT
Start: 2024-12-03 | End: 2024-12-03

## 2024-12-03 RX ORDER — KETOROLAC TROMETHAMINE 30 MG/ML
30 INJECTION INTRAMUSCULAR; INTRAVENOUS ONCE
Refills: 0 | Status: DISCONTINUED | OUTPATIENT
Start: 2024-12-03 | End: 2024-12-03

## 2024-12-03 RX ORDER — LIDOCAINE 40 MG/G
1 CREAM TOPICAL
Qty: 2 | Refills: 0
Start: 2024-12-03 | End: 2024-12-07

## 2024-12-03 RX ADMIN — LIDOCAINE 1 PATCH: 40 CREAM TOPICAL at 02:00

## 2024-12-03 RX ADMIN — METHOCARBAMOL 750 MILLIGRAM(S): 500 TABLET, FILM COATED ORAL at 02:00

## 2024-12-03 RX ADMIN — ACETAMINOPHEN 500MG 975 MILLIGRAM(S): 500 TABLET, COATED ORAL at 02:00

## 2024-12-03 RX ADMIN — KETOROLAC TROMETHAMINE 30 MILLIGRAM(S): 30 INJECTION INTRAMUSCULAR; INTRAVENOUS at 02:00

## 2024-12-03 NOTE — ED ADULT NURSE NOTE - NSFALLUNIVINTERV_ED_ALL_ED
Bed/Stretcher in lowest position, wheels locked, appropriate side rails in place/Call bell, personal items and telephone in reach/Instruct patient to call for assistance before getting out of bed/chair/stretcher/Non-slip footwear applied when patient is off stretcher/[place] to call system/Physically safe environment - no spills, clutter or unnecessary equipment/Purposeful proactive rounding/Room/bathroom lighting operational, light cord in reach

## 2024-12-03 NOTE — ED PROVIDER NOTE - OBJECTIVE STATEMENT
69-year-old male past medical history HTN, HLD, DM, CAD s/p CABG presents with complaints of pain radiating from left lateral buttock down posterior aspect of left lower extremity.  Denies fall/trauma, gait disturbance, saddle anesthesia, lightheadedness/dizziness, chest pain, shortness of breath, abdominal pain, nausea/vomiting/diarrhea, change in bowel/bladder habits, urinary or bowel retention/incontinence, dysuria/frequency/urgency/hematuria.

## 2024-12-03 NOTE — ED PROVIDER NOTE - CLINICAL SUMMARY MEDICAL DECISION MAKING FREE TEXT BOX
Labs, EKG and imaging were ordered, where indicated.  Independent interpretation of any labs, EKG & imaging that was ordered was performed by me, Dr. Calvillo. Appropriate medications for patient's presenting complaints were ordered and effects were reassessed, where indicated.  Patient's records (prior hospital, ED visit, and/or nursing home note) were reviewed, if available.  Additional history was obtained from EMS, family, and/or PCP (where available).  Escalation to admission/observation was considered.  However patient feels much better and patient/parent is comfortable with discharge.  Appropriate follow-up was arranged.     Lback/hip/leg pain, suspest MSK - avss, analgesia provided, neuro exam nf, pt reassessed and pain improved ambulatory in ED - all results d/w pt & copies given, strict return precautions discussed, rec outpt Pain Mgmt f/u with previously established provider

## 2024-12-03 NOTE — ED PROVIDER NOTE - PHYSICAL EXAMINATION
Vital Signs: I have reviewed the initial vital signs.  Constitutional: appears stated age, no acute distress  Eyes: Sclera clear, EOMI.  Cardiovascular: S1 and S2, regular rate, regular rhythm, well-perfused extremities, radial pulses equal and 2+, pedal pulses 2+ and equal  Respiratory: unlabored respiratory effort, clear to auscultation bilaterally no wheezing, rales, or rhonchi  Gastrointestinal:  abdomen soft, non-tender  Musculoskeletal: supple neck, no lower extremity edema, no midline spinal tenderness    Integumentary: warm, dry, no rash  Neurologic: awake, alert, oriented x3, extremities’ motor and sensory functions grossly intact

## 2024-12-03 NOTE — ED PROVIDER NOTE - NSFOLLOWUPINSTRUCTIONS_ED_ALL_ED_FT
Follow-up with your pain management doctor in 1-3 days.    Medications were sent to your pharmacy - take as prescribed.    Back Pain    Back pain is very common in adults. The cause of back pain is rarely dangerous and the pain often gets better over time. The cause of your back pain may not be known and may include strain of muscles or ligaments, degeneration of the spinal disks, or arthritis. Occasionally the pain may radiate down your leg(s). Over-the-counter medicines to reduce pain and inflammation are often the most helpful. Stretching and remaining active frequently helps the healing process.     SEEK IMMEDIATE MEDICAL CARE IF YOU HAVE ANY OF THE FOLLOWING SYMPTOMS: bowel or bladder control problems, unusual weakness or numbness in your arms or legs, nausea or vomiting, abdominal pain, fever, dizziness/lightheadedness.

## 2024-12-03 NOTE — ED PROVIDER NOTE - PATIENT PORTAL LINK FT
You can access the FollowMyHealth Patient Portal offered by [facility] by registering at the following website: [email or web address]. By joining [facility]’s FollowMyHealth portal, you will also be able to view your health information using other applications (apps) compatible with our system.

## 2024-12-03 NOTE — ED PROVIDER NOTE - ATTENDING APP SHARED VISIT CONTRIBUTION OF CARE
69M pmh cad/cabg, dm, htn, hl, psx L TKR, p/w L hip pain x few weeks. Radiating down LLE. Denies any recent falls or trauma. No f/c, saddle anesthesia, b/b incontinence, focal numbness or weakness.    PE: as per PA note

## 2024-12-16 NOTE — ED ADULT TRIAGE NOTE - RESPIRATORY RATE (BREATHS/MIN)
Patient has Abnormal Magnesium: hypomagnesemia. Will continue to monitor electrolytes closely. Will replace the affected electrolytes and repeat labs to be done after interventions completed. The patient's magnesium results have been reviewed and are listed below.  Recent Labs   Lab 12/16/24  0356   MG 1.3*       18

## 2024-12-17 ENCOUNTER — APPOINTMENT (OUTPATIENT)
Dept: PAIN MANAGEMENT | Facility: CLINIC | Age: 69
End: 2024-12-17

## 2025-01-14 ENCOUNTER — APPOINTMENT (OUTPATIENT)
Dept: PAIN MANAGEMENT | Facility: CLINIC | Age: 70
End: 2025-01-14
Payer: MEDICARE

## 2025-01-14 DIAGNOSIS — M51.16 INTERVERTEBRAL DISC DISORDERS WITH RADICULOPATHY, LUMBAR REGION: ICD-10-CM

## 2025-01-14 DIAGNOSIS — M54.16 RADICULOPATHY, LUMBAR REGION: ICD-10-CM

## 2025-01-14 DIAGNOSIS — M43.06 SPONDYLOLYSIS, LUMBAR REGION: ICD-10-CM

## 2025-01-14 DIAGNOSIS — M48.061 SPINAL STENOSIS, LUMBAR REGION WITHOUT NEUROGENIC CLAUDICATION: ICD-10-CM

## 2025-01-14 PROCEDURE — 99214 OFFICE O/P EST MOD 30 MIN: CPT

## 2025-02-01 ENCOUNTER — EMERGENCY (EMERGENCY)
Facility: HOSPITAL | Age: 70
LOS: 0 days | Discharge: HOME | End: 2025-02-01
Attending: EMERGENCY MEDICINE
Payer: MEDICARE

## 2025-02-01 VITALS
WEIGHT: 190.04 LBS | SYSTOLIC BLOOD PRESSURE: 178 MMHG | HEIGHT: 68 IN | HEART RATE: 85 BPM | DIASTOLIC BLOOD PRESSURE: 81 MMHG | RESPIRATION RATE: 16 BRPM | TEMPERATURE: 98 F | OXYGEN SATURATION: 99 %

## 2025-02-01 DIAGNOSIS — I25.10 ATHEROSCLEROTIC HEART DISEASE OF NATIVE CORONARY ARTERY WITHOUT ANGINA PECTORIS: ICD-10-CM

## 2025-02-01 DIAGNOSIS — I10 ESSENTIAL (PRIMARY) HYPERTENSION: ICD-10-CM

## 2025-02-01 DIAGNOSIS — Z98.890 OTHER SPECIFIED POSTPROCEDURAL STATES: Chronic | ICD-10-CM

## 2025-02-01 DIAGNOSIS — Z95.1 PRESENCE OF AORTOCORONARY BYPASS GRAFT: ICD-10-CM

## 2025-02-01 DIAGNOSIS — E78.5 HYPERLIPIDEMIA, UNSPECIFIED: ICD-10-CM

## 2025-02-01 DIAGNOSIS — M54.50 LOW BACK PAIN, UNSPECIFIED: ICD-10-CM

## 2025-02-01 DIAGNOSIS — E11.9 TYPE 2 DIABETES MELLITUS WITHOUT COMPLICATIONS: ICD-10-CM

## 2025-02-01 DIAGNOSIS — Z88.0 ALLERGY STATUS TO PENICILLIN: ICD-10-CM

## 2025-02-01 PROCEDURE — 99284 EMERGENCY DEPT VISIT MOD MDM: CPT

## 2025-02-01 PROCEDURE — 99283 EMERGENCY DEPT VISIT LOW MDM: CPT

## 2025-02-01 RX ORDER — METHOCARBAMOL 500 MG/1
2 TABLET, FILM COATED ORAL
Qty: 18 | Refills: 0
Start: 2025-02-01 | End: 2025-02-03

## 2025-02-01 RX ORDER — LIDOCAINE HYDROCHLORIDE 20 MG/ML
1 JELLY TOPICAL
Qty: 2 | Refills: 0
Start: 2025-02-01 | End: 2025-02-03

## 2025-02-01 RX ORDER — METHOCARBAMOL 500 MG/1
1500 TABLET, FILM COATED ORAL ONCE
Refills: 0 | Status: COMPLETED | OUTPATIENT
Start: 2025-02-01 | End: 2025-02-01

## 2025-02-01 RX ORDER — LIDOCAINE HYDROCHLORIDE 20 MG/ML
1 JELLY TOPICAL ONCE
Refills: 0 | Status: COMPLETED | OUTPATIENT
Start: 2025-02-01 | End: 2025-02-01

## 2025-02-01 RX ADMIN — METHOCARBAMOL 1500 MILLIGRAM(S): 500 TABLET, FILM COATED ORAL at 22:00

## 2025-02-01 RX ADMIN — LIDOCAINE HYDROCHLORIDE 1 PATCH: 20 JELLY TOPICAL at 22:00

## 2025-02-07 ENCOUNTER — APPOINTMENT (OUTPATIENT)
Dept: PAIN MANAGEMENT | Facility: CLINIC | Age: 70
End: 2025-02-07
Payer: MEDICARE

## 2025-02-07 DIAGNOSIS — M54.16 RADICULOPATHY, LUMBAR REGION: ICD-10-CM

## 2025-02-07 PROCEDURE — 94761 N-INVAS EAR/PLS OXIMETRY MLT: CPT | Mod: 59

## 2025-02-07 PROCEDURE — 64484 NJX AA&/STRD TFRM EPI L/S EA: CPT | Mod: LT

## 2025-02-07 PROCEDURE — 93040 RHYTHM ECG WITH REPORT: CPT | Mod: 79

## 2025-02-07 PROCEDURE — 64483 NJX AA&/STRD TFRM EPI L/S 1: CPT | Mod: LT

## 2025-02-07 PROCEDURE — 93770 DETERMINATION VENOUS PRESS: CPT

## 2025-02-25 ENCOUNTER — APPOINTMENT (OUTPATIENT)
Dept: PAIN MANAGEMENT | Facility: CLINIC | Age: 70
End: 2025-02-25
Payer: MEDICARE

## 2025-02-25 DIAGNOSIS — G57.00 LESION OF SCIATIC NERVE, UNSPECIFIED LOWER LIMB: ICD-10-CM

## 2025-02-25 PROCEDURE — 99214 OFFICE O/P EST MOD 30 MIN: CPT

## 2025-04-11 NOTE — ED ADULT NURSE NOTE - DRUG PRE-SCREENING (DAST -1)
Assessment/Plan:      Diagnoses and all orders for this visit:    B12 deficiency          Subjective:     Patient ID: Jolene Tierney is a 44 y.o. female.          Objective:      There were no vitals taken for this visit.          Statement Selected

## 2025-04-14 ENCOUNTER — APPOINTMENT (OUTPATIENT)
Dept: SURGERY | Facility: CLINIC | Age: 70
End: 2025-04-14

## 2025-05-01 ENCOUNTER — NON-APPOINTMENT (OUTPATIENT)
Age: 70
End: 2025-05-01

## 2025-05-23 ENCOUNTER — APPOINTMENT (OUTPATIENT)
Dept: PAIN MANAGEMENT | Facility: CLINIC | Age: 70
End: 2025-05-23

## 2025-05-23 DIAGNOSIS — G57.00 LESION OF SCIATIC NERVE, UNSPECIFIED LOWER LIMB: ICD-10-CM

## 2025-05-23 PROCEDURE — 94761 N-INVAS EAR/PLS OXIMETRY MLT: CPT

## 2025-05-23 PROCEDURE — 64445 NJX AA&/STRD SCIATIC NRV IMG: CPT | Mod: 50

## 2025-05-23 PROCEDURE — 93040 RHYTHM ECG WITH REPORT: CPT | Mod: 79

## 2025-05-23 PROCEDURE — 93770 DETERMINATION VENOUS PRESS: CPT

## 2025-05-23 PROCEDURE — 77003 FLUOROGUIDE FOR SPINE INJECT: CPT | Mod: 79

## 2025-06-03 ENCOUNTER — APPOINTMENT (OUTPATIENT)
Dept: PAIN MANAGEMENT | Facility: CLINIC | Age: 70
End: 2025-06-03
Payer: MEDICARE

## 2025-06-03 VITALS — BODY MASS INDEX: 31.71 KG/M2 | WEIGHT: 202 LBS | HEIGHT: 67 IN

## 2025-06-03 DIAGNOSIS — M54.16 RADICULOPATHY, LUMBAR REGION: ICD-10-CM

## 2025-06-03 DIAGNOSIS — G57.00 LESION OF SCIATIC NERVE, UNSPECIFIED LOWER LIMB: ICD-10-CM

## 2025-06-03 DIAGNOSIS — M43.06 SPONDYLOLYSIS, LUMBAR REGION: ICD-10-CM

## 2025-06-03 PROCEDURE — 99214 OFFICE O/P EST MOD 30 MIN: CPT

## 2025-08-05 ENCOUNTER — APPOINTMENT (OUTPATIENT)
Dept: PAIN MANAGEMENT | Facility: CLINIC | Age: 70
End: 2025-08-05

## 2025-09-09 ENCOUNTER — APPOINTMENT (OUTPATIENT)
Dept: PAIN MANAGEMENT | Facility: CLINIC | Age: 70
End: 2025-09-09
Payer: MEDICARE

## 2025-09-09 DIAGNOSIS — G57.00 LESION OF SCIATIC NERVE, UNSPECIFIED LOWER LIMB: ICD-10-CM

## 2025-09-09 PROCEDURE — 99214 OFFICE O/P EST MOD 30 MIN: CPT
